# Patient Record
Sex: MALE | Race: WHITE
[De-identification: names, ages, dates, MRNs, and addresses within clinical notes are randomized per-mention and may not be internally consistent; named-entity substitution may affect disease eponyms.]

---

## 2023-11-22 ENCOUNTER — HOSPITAL ENCOUNTER (EMERGENCY)
Age: 68
Discharge: ANOTHER HOSPITAL | End: 2023-11-22
Payer: MEDICARE

## 2023-11-22 ENCOUNTER — HOSPITAL ENCOUNTER (INPATIENT)
Age: 68
LOS: 9 days | Discharge: HOME | DRG: 417 | End: 2023-12-01
Attending: INTERNAL MEDICINE | Admitting: STUDENT IN AN ORGANIZED HEALTH CARE EDUCATION/TRAINING PROGRAM
Payer: MEDICARE

## 2023-11-22 VITALS — SYSTOLIC BLOOD PRESSURE: 138 MMHG | DIASTOLIC BLOOD PRESSURE: 96 MMHG | OXYGEN SATURATION: 98 %

## 2023-11-22 VITALS — RESPIRATION RATE: 17 BRPM | DIASTOLIC BLOOD PRESSURE: 92 MMHG | HEART RATE: 58 BPM | SYSTOLIC BLOOD PRESSURE: 120 MMHG

## 2023-11-22 VITALS — RESPIRATION RATE: 17 BRPM | SYSTOLIC BLOOD PRESSURE: 127 MMHG | DIASTOLIC BLOOD PRESSURE: 90 MMHG | HEART RATE: 65 BPM

## 2023-11-22 VITALS — HEART RATE: 62 BPM | OXYGEN SATURATION: 96 % | RESPIRATION RATE: 17 BRPM

## 2023-11-22 VITALS
TEMPERATURE: 97.88 F | DIASTOLIC BLOOD PRESSURE: 96 MMHG | OXYGEN SATURATION: 98 % | RESPIRATION RATE: 16 BRPM | SYSTOLIC BLOOD PRESSURE: 138 MMHG | HEART RATE: 59 BPM

## 2023-11-22 VITALS
RESPIRATION RATE: 19 BRPM | HEART RATE: 60 BPM | DIASTOLIC BLOOD PRESSURE: 91 MMHG | OXYGEN SATURATION: 96 % | SYSTOLIC BLOOD PRESSURE: 124 MMHG

## 2023-11-22 VITALS — HEART RATE: 74 BPM | RESPIRATION RATE: 19 BRPM

## 2023-11-22 VITALS — DIASTOLIC BLOOD PRESSURE: 86 MMHG | SYSTOLIC BLOOD PRESSURE: 126 MMHG

## 2023-11-22 VITALS — OXYGEN SATURATION: 97 % | HEART RATE: 55 BPM | RESPIRATION RATE: 20 BRPM

## 2023-11-22 VITALS — HEART RATE: 63 BPM | OXYGEN SATURATION: 97 % | RESPIRATION RATE: 17 BRPM

## 2023-11-22 VITALS — RESPIRATION RATE: 16 BRPM | HEART RATE: 55 BPM

## 2023-11-22 VITALS — RESPIRATION RATE: 22 BRPM | HEART RATE: 65 BPM | OXYGEN SATURATION: 97 %

## 2023-11-22 VITALS — HEART RATE: 60 BPM | OXYGEN SATURATION: 96 % | RESPIRATION RATE: 16 BRPM

## 2023-11-22 VITALS — RESPIRATION RATE: 16 BRPM | HEART RATE: 57 BPM

## 2023-11-22 VITALS — RESPIRATION RATE: 5 BRPM | OXYGEN SATURATION: 99 % | HEART RATE: 53 BPM

## 2023-11-22 VITALS — OXYGEN SATURATION: 98 %

## 2023-11-22 VITALS — BODY MASS INDEX: 24.4 KG/M2

## 2023-11-22 VITALS — HEART RATE: 67 BPM

## 2023-11-22 DIAGNOSIS — Z79.899: ICD-10-CM

## 2023-11-22 DIAGNOSIS — F03.90: ICD-10-CM

## 2023-11-22 DIAGNOSIS — K80.01 CALCULUS OF GALLBLADDER WITH ACUTE CHOLECYSTITIS AND OBSTRUCTION: ICD-10-CM

## 2023-11-22 DIAGNOSIS — K83.1 OBSTRUCTIVE JAUNDICE: ICD-10-CM

## 2023-11-22 DIAGNOSIS — Z87.891: ICD-10-CM

## 2023-11-22 DIAGNOSIS — K83.09: Primary | ICD-10-CM

## 2023-11-22 DIAGNOSIS — R17: ICD-10-CM

## 2023-11-22 LAB
ADD MANUAL DIFF: NO
ALANINE AMINOTRANSFERASE: 174 U/L (ref 16–63)
ALBUMIN GLOBULIN RATIO: 0.8
ALBUMIN LEVEL: 3.1 G/DL (ref 3.4–5)
ALKALINE PHOSPHATASE: 472 U/L (ref 46–116)
AMYLASE: 46 U/L (ref 25–115)
ANION GAP: 14.7
ASPARTATE AMINO TRANSFERASE: 105 U/L (ref 15–37)
BLOOD UREA NITROGEN: 12 MG/DL (ref 7–18)
CALCIUM: 9.1 MG/DL (ref 8.5–10.1)
CARBON DIOXIDE: 23.6 MMOL/L (ref 21–32)
CAST SEEN?: (no result) #/LPF
CHLORIDE: 100 MMOL/L (ref 98–107)
CO2 BLD-SCNC: 23.6 MMOL/L (ref 21–32)
ESTIMATED GFR (AFRICAN AMERICA: >60 (ref 60–?)
ESTIMATED GFR (NON-AFRICAN AME: >60 (ref 60–?)
GLOBULIN: 4 G/DL
GLUCOSE BLD-MCNC: 93 MG/DL (ref 74–106)
GLUCOSE URINE UA: (no result) MG/DL
HCT VFR BLD CALC: 43.5 % (ref 42–54)
HEMATOCRIT: 43.5 % (ref 42–54)
HEMOGLOBIN: 15.1 G/DL (ref 14–18)
IMMATURE GRANULOCYTES ABS AUTO: 0.09 10^3/UL (ref 0–0.03)
IMMATURE GRANULOCYTES PCT AUTO: 1.3 % (ref 0–0.5)
LIPASE: 58 U/L (ref 16–77)
LYMPHOCYTES  ABSOLUTE AUTO: 1.3 10^3/UL (ref 1.2–3.8)
MCV RBC: 86.3 FL (ref 80–94)
MEAN CORPUSCULAR HEMOGLOBIN: 30 PG (ref 25.9–34)
MEAN CORPUSCULAR HGB CONC: 34.7 G/DL (ref 29.9–35.2)
MEAN CORPUSCULAR VOLUME: 86.3 FL (ref 80–94)
PLATELET # BLD: 251 10^3/UL (ref 150–450)
PLATELET COUNT: 251 10^3/UL (ref 150–450)
POTASSIUM SERPLBLD-SCNC: 3.3 MMOL/L (ref 3.5–5.1)
POTASSIUM: 3.3 MMOL/L (ref 3.5–5.1)
RED BLOOD COUNT: 5.04 10^6/UL (ref 4.7–6.1)
SODIUM BLD-SCNC: 135 MMOL/L (ref 136–145)
SODIUM: 135 MMOL/L (ref 136–145)
TOTAL PROTEIN: 7.1 G/DL (ref 6.4–8.2)
WBC # BLD: 7.2 10^3/UL (ref 4–11)
WHITE BLOOD COUNT: 7.2 10^3/UL (ref 4–11)

## 2023-11-22 PROCEDURE — 93005 ELECTROCARDIOGRAM TRACING: CPT

## 2023-11-22 PROCEDURE — 2060000000 HC ICU INTERMEDIATE R&B

## 2023-11-22 PROCEDURE — 36415 COLL VENOUS BLD VENIPUNCTURE: CPT

## 2023-11-22 PROCEDURE — 87186 SC STD MICRODIL/AGAR DIL: CPT

## 2023-11-22 PROCEDURE — 99285 EMERGENCY DEPT VISIT HI MDM: CPT

## 2023-11-22 PROCEDURE — 2580000003 HC RX 258: Performed by: NURSE PRACTITIONER

## 2023-11-22 PROCEDURE — 87150 DNA/RNA AMPLIFIED PROBE: CPT

## 2023-11-22 PROCEDURE — 96365 THER/PROPH/DIAG IV INF INIT: CPT

## 2023-11-22 PROCEDURE — 80048 BASIC METABOLIC PNL TOTAL CA: CPT

## 2023-11-22 PROCEDURE — 81001 URINALYSIS AUTO W/SCOPE: CPT

## 2023-11-22 PROCEDURE — 82150 ASSAY OF AMYLASE: CPT

## 2023-11-22 PROCEDURE — 83690 ASSAY OF LIPASE: CPT

## 2023-11-22 PROCEDURE — 80074 ACUTE HEPATITIS PANEL: CPT

## 2023-11-22 PROCEDURE — 74177 CT ABD & PELVIS W/CONTRAST: CPT

## 2023-11-22 PROCEDURE — 85025 COMPLETE CBC W/AUTO DIFF WBC: CPT

## 2023-11-22 PROCEDURE — 71045 X-RAY EXAM CHEST 1 VIEW: CPT

## 2023-11-22 PROCEDURE — 87522 HEPATITIS C REVRS TRNSCRPJ: CPT

## 2023-11-22 PROCEDURE — 87040 BLOOD CULTURE FOR BACTERIA: CPT

## 2023-11-22 PROCEDURE — 80076 HEPATIC FUNCTION PANEL: CPT

## 2023-11-22 RX ORDER — ONDANSETRON 4 MG/1
4 TABLET, ORALLY DISINTEGRATING ORAL EVERY 8 HOURS PRN
Status: DISCONTINUED | OUTPATIENT
Start: 2023-11-22 | End: 2023-12-01 | Stop reason: HOSPADM

## 2023-11-22 RX ORDER — SODIUM CHLORIDE 0.9 % (FLUSH) 0.9 %
5-40 SYRINGE (ML) INJECTION PRN
Status: DISCONTINUED | OUTPATIENT
Start: 2023-11-22 | End: 2023-12-01 | Stop reason: HOSPADM

## 2023-11-22 RX ORDER — SODIUM CHLORIDE 9 MG/ML
INJECTION, SOLUTION INTRAVENOUS CONTINUOUS
Status: DISCONTINUED | OUTPATIENT
Start: 2023-11-22 | End: 2023-11-23

## 2023-11-22 RX ORDER — ENOXAPARIN SODIUM 100 MG/ML
40 INJECTION SUBCUTANEOUS DAILY
Status: DISCONTINUED | OUTPATIENT
Start: 2023-11-23 | End: 2023-12-01 | Stop reason: HOSPADM

## 2023-11-22 RX ORDER — POTASSIUM CHLORIDE 7.45 MG/ML
10 INJECTION INTRAVENOUS PRN
Status: DISCONTINUED | OUTPATIENT
Start: 2023-11-22 | End: 2023-11-23

## 2023-11-22 RX ORDER — ONDANSETRON 2 MG/ML
4 INJECTION INTRAMUSCULAR; INTRAVENOUS EVERY 6 HOURS PRN
Status: DISCONTINUED | OUTPATIENT
Start: 2023-11-22 | End: 2023-12-01 | Stop reason: HOSPADM

## 2023-11-22 RX ORDER — SODIUM CHLORIDE 9 MG/ML
INJECTION, SOLUTION INTRAVENOUS PRN
Status: DISCONTINUED | OUTPATIENT
Start: 2023-11-22 | End: 2023-12-01 | Stop reason: HOSPADM

## 2023-11-22 RX ORDER — SODIUM CHLORIDE 0.9 % (FLUSH) 0.9 %
5-40 SYRINGE (ML) INJECTION EVERY 12 HOURS SCHEDULED
Status: DISCONTINUED | OUTPATIENT
Start: 2023-11-22 | End: 2023-12-01 | Stop reason: HOSPADM

## 2023-11-22 RX ORDER — MAGNESIUM SULFATE 1 G/100ML
1000 INJECTION INTRAVENOUS PRN
Status: DISCONTINUED | OUTPATIENT
Start: 2023-11-22 | End: 2023-12-01 | Stop reason: HOSPADM

## 2023-11-22 RX ADMIN — SODIUM CHLORIDE, PRESERVATIVE FREE 10 ML: 5 INJECTION INTRAVENOUS at 23:46

## 2023-11-22 RX ADMIN — SODIUM CHLORIDE: 9 INJECTION, SOLUTION INTRAVENOUS at 23:52

## 2023-11-22 NOTE — ECG_ITS
[facility] 
                                        
                                       Test Date:    2023 
Pat Name:     SANGITA BARRY          Department:    
Patient ID:   [id number]               Room:         - 
Gender:       Male                     Technician:    
:          1955               Requested By: 1030 
Order Number: [id number]              Reading MD:    
                                 Measurements 
Intervals                              Axis           
Rate:         57                       P:            35 
UT:           162                      QRS:          -36 
QRSD:         98                       T:            8 
QT:           398                                     
QTc:          392                                     
                           Interpretive Statements 
1100 Sinus rhythm 
4038 Nonspecific ST elevation 
4664 Twave abnormality, possible inferior ischemia 
7200 Abnormal left axis deviation 
8003 Consistent with pulmonary disease 
9150 **  abnormal ECG  ** 
No previous ECG available for comparison

## 2023-11-22 NOTE — XR_ITS
The [address] [address] 
     [phone] 
  
  
Patient Name: 
SANGITA BARRY 
  
MRN: [id number]    YOB: 1955    Sex: M 
Assigned Patient Location: ER 
Current Patient Location: ER 
Accession/Order Number: [id number] 
Exam Date: 11/22/2023  16:28    Report Date: 11/22/2023  17:14 
  
At the request of: 
MARZENA MOYA   
  
Procedure:  XR chest 1V 
  
EXAM: XR chest 1V  
  
HISTORY: weak, new jaundice 
  
COMPARISON: None.  
  
TECHNIQUE: Chest X-ray AP, 1 view 
  
FINDINGS:  
  
Support devices: Right-sided  shunt catheter is noted coursing through the  
right hemithorax. 
  
Lungs/pleura: No consolidation, effusion, or pneumothorax. 
  
Heart and mediastinum: Normal contours. 
  
Bones: No acute abnormality identified. 
  
ORDER #: [id number] XR/XR chest 1V  
Impression:  
No radiographic evidence of acute cardiopulmonary process.  
   
   
Electronically authenticated by: CRUZ SALGADO   Date: 11/22/2023  17:14
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
## 2023-11-22 NOTE — CT_ITS
The [address] [address] 
     [phone] 
  
  
Patient Name: 
SANGITA BARRY 
  
MRN: [id number]    YOB: 1955    Sex: M 
Assigned Patient Location: ED.MAIN 
Current Patient Location:  
Accession/Order Number: [id number] 
Exam Date: 11/22/2023  17:15    Report Date: 11/22/2023  18:15 
  
At the request of: 
MARZENA MOYA   
  
Procedure:  CT abdomen pelvis w con 
  
EXAM: CT abdomen pelvis w con  
  
HISTORY: new onset jaundice 
  
COMPARISON: None.  
  
TECHNIQUE: Axial CT imaging was performed through the abdomen and pelvis with  
intravenous contrast. Multiplanar reformats were performed. Dose reduction  
techniques were achieved by using automated exposure control and/or adjustment  
  
of mA and/or kV according to patient size and/or use of iterative  
reconstruction technique. 
  
FINDINGS: 
Lung bases: Lung bases are clear. No pleural effusion. 
  
GI upper: Unremarkable. 
  
Liver: Normal size and contour. 
Gallbladder: No significant abnormality. No cholelithiasis. 
Biliary system: There is dilatation of the intra and extrahepatic biliary  
ducts  
with beading appearance of the intrahepatic biliary ducts. The common bile  
duct  
tapers toward the ampulla without definite choledocholithiasis. Aforementioned  
  
findings consistent with acute sclerosing cholangitis. MRI of the abdomen with  
  
and without contrast and MRCP is recommended for better evaluation. 
Spleen: Normal size. 
Pancreas: Unremarkable. 
  
Adrenal glands: Normal adrenal glands. 
Kidneys/ureters: Normal contours. No hydronephrosis. No nephrolithiasis or  
ureterolithiasis. There is a 6.3 cm enhancing thin multiseptated right renal  
cyst (Bosniak type II F). Attention on abdominal MRI for better evaluation. 
  
Vessels: No aneurysm. 
Lymph Nodes: No lymphadenopathy. 
  
Small bowel: No wall thickening or dilatation. 
Colon: No wall thickening or dilatation. 
Appendix: No findings of appendicitis. 
Peritoneal cavity: No free fluid or pneumoperitoneum. Right  shunt is noted  
with distal tip within the mid upper abdomen. 
  
Lower : Prostatomegaly, measuring 5.4 cm with intravesical protrusion.  
Correlation with PSA is recommended. Trabeculation of the urinary bladder,  
likely due to chronic outlet obstruction. 
  
Bones: No acute bony abnormality.  
Soft tissues: No acute finding. 
  
Additional findings: None. 
  
ORDER #: [id number] CT/CT abdomen pelvis w con  
IMPRESSION:   
dilatation of the intra and extrahepatic biliary ducts with beading appearance 
  
   
of the intrahepatic biliary ducts. The common bile duct tapers toward the   
ampulla without definite choledocholithiasis. Aforementioned findings   
consistent with acute sclerosing cholangitis. MRI of the abdomen with and   
without contrast and MRCP is recommended for better evaluation and exclusion   
of   
neoplasm.  
   
6.3 cm enhancing thin multiseptated right renal cyst (Bosniak type II F).   
Attention on abdominal MRI for better evaluation and follow-up in 6 months is   
recommended.  
   
   
Electronically authenticated by: CRUZ SALGADO   Date: 11/22/2023  18:15

## 2023-11-23 PROBLEM — E83.51 HYPOCALCEMIA: Status: ACTIVE | Noted: 2023-11-23

## 2023-11-23 PROBLEM — K83.1 OBSTRUCTIVE JAUNDICE: Status: ACTIVE | Noted: 2023-11-23

## 2023-11-23 PROBLEM — R41.89 COGNITIVE IMPAIRMENT: Status: ACTIVE | Noted: 2023-11-23

## 2023-11-23 PROBLEM — E87.6 HYPOKALEMIA: Status: ACTIVE | Noted: 2023-11-23

## 2023-11-23 LAB
ALBUMIN SERPL-MCNC: 3 G/DL (ref 3.5–5.2)
ALBUMIN SERPL-MCNC: 3 G/DL (ref 3.5–5.2)
ALBUMIN/GLOB SERPL: 1 {RATIO} (ref 1–2.5)
ALBUMIN/GLOB SERPL: 1 {RATIO} (ref 1–2.5)
ALP SERPL-CCNC: 358 U/L (ref 40–129)
ALP SERPL-CCNC: 358 U/L (ref 40–129)
ALT SERPL-CCNC: 107 U/L (ref 5–41)
ALT SERPL-CCNC: 107 U/L (ref 5–41)
ANION GAP SERPL CALCULATED.3IONS-SCNC: 10 MMOL/L (ref 9–17)
ANION GAP SERPL CALCULATED.3IONS-SCNC: 10 MMOL/L (ref 9–17)
AST SERPL-CCNC: 76 U/L
AST SERPL-CCNC: 76 U/L
BILIRUB DIRECT SERPL-MCNC: 5.6 MG/DL
BILIRUB DIRECT SERPL-MCNC: 5.6 MG/DL
BILIRUB INDIRECT SERPL-MCNC: 2.9 MG/DL (ref 0–1)
BILIRUB INDIRECT SERPL-MCNC: 2.9 MG/DL (ref 0–1)
BILIRUB SERPL-MCNC: 8.5 MG/DL (ref 0.3–1.2)
BILIRUB SERPL-MCNC: 8.5 MG/DL (ref 0.3–1.2)
BUN SERPL-MCNC: 8 MG/DL (ref 8–23)
BUN SERPL-MCNC: 8 MG/DL (ref 8–23)
CA-I BLD-SCNC: 1.12 MMOL/L (ref 1.13–1.33)
CA-I BLD-SCNC: 1.12 MMOL/L (ref 1.13–1.33)
CALCIUM SERPL-MCNC: 8.4 MG/DL (ref 8.6–10.4)
CALCIUM SERPL-MCNC: 8.4 MG/DL (ref 8.6–10.4)
CHLORIDE SERPL-SCNC: 102 MMOL/L (ref 98–107)
CHLORIDE SERPL-SCNC: 102 MMOL/L (ref 98–107)
CO2 SERPL-SCNC: 22 MMOL/L (ref 20–31)
CO2 SERPL-SCNC: 22 MMOL/L (ref 20–31)
CREAT SERPL-MCNC: 0.6 MG/DL (ref 0.7–1.2)
CREAT SERPL-MCNC: 0.6 MG/DL (ref 0.7–1.2)
GFR SERPL CREATININE-BSD FRML MDRD: >60 ML/MIN/1.73M2
GFR SERPL CREATININE-BSD FRML MDRD: >60 ML/MIN/1.73M2
GLUCOSE SERPL-MCNC: 95 MG/DL (ref 70–99)
GLUCOSE SERPL-MCNC: 95 MG/DL (ref 70–99)
INR PPP: 0.9
INR PPP: 0.9
LIPASE SERPL-CCNC: 48 U/L (ref 13–60)
LIPASE SERPL-CCNC: 48 U/L (ref 13–60)
MAGNESIUM SERPL-MCNC: 2.3 MG/DL (ref 1.6–2.6)
MAGNESIUM SERPL-MCNC: 2.3 MG/DL (ref 1.6–2.6)
PHOSPHATE SERPL-MCNC: 2.6 MG/DL (ref 2.5–4.5)
PHOSPHATE SERPL-MCNC: 2.6 MG/DL (ref 2.5–4.5)
POTASSIUM SERPL-SCNC: 3.3 MMOL/L (ref 3.7–5.3)
POTASSIUM SERPL-SCNC: 3.3 MMOL/L (ref 3.7–5.3)
PROT SERPL-MCNC: 5.9 G/DL (ref 6.4–8.3)
PROT SERPL-MCNC: 5.9 G/DL (ref 6.4–8.3)
PROTHROMBIN TIME: 11.8 SEC (ref 11.7–14.9)
PROTHROMBIN TIME: 11.8 SEC (ref 11.7–14.9)
SODIUM SERPL-SCNC: 134 MMOL/L (ref 135–144)
SODIUM SERPL-SCNC: 134 MMOL/L (ref 135–144)
TRIGL SERPL-MCNC: 138 MG/DL
TRIGL SERPL-MCNC: 138 MG/DL

## 2023-11-23 PROCEDURE — 80053 COMPREHEN METABOLIC PANEL: CPT

## 2023-11-23 PROCEDURE — 84478 ASSAY OF TRIGLYCERIDES: CPT

## 2023-11-23 PROCEDURE — 94761 N-INVAS EAR/PLS OXIMETRY MLT: CPT

## 2023-11-23 PROCEDURE — 82330 ASSAY OF CALCIUM: CPT

## 2023-11-23 PROCEDURE — 2580000003 HC RX 258: Performed by: NURSE PRACTITIONER

## 2023-11-23 PROCEDURE — 6370000000 HC RX 637 (ALT 250 FOR IP): Performed by: INTERNAL MEDICINE

## 2023-11-23 PROCEDURE — 83690 ASSAY OF LIPASE: CPT

## 2023-11-23 PROCEDURE — 6360000002 HC RX W HCPCS: Performed by: NURSE PRACTITIONER

## 2023-11-23 PROCEDURE — 83516 IMMUNOASSAY NONANTIBODY: CPT

## 2023-11-23 PROCEDURE — 82248 BILIRUBIN DIRECT: CPT

## 2023-11-23 PROCEDURE — 85610 PROTHROMBIN TIME: CPT

## 2023-11-23 PROCEDURE — 83735 ASSAY OF MAGNESIUM: CPT

## 2023-11-23 PROCEDURE — 84100 ASSAY OF PHOSPHORUS: CPT

## 2023-11-23 PROCEDURE — 36415 COLL VENOUS BLD VENIPUNCTURE: CPT

## 2023-11-23 PROCEDURE — 2060000000 HC ICU INTERMEDIATE R&B

## 2023-11-23 PROCEDURE — 93005 ELECTROCARDIOGRAM TRACING: CPT | Performed by: NURSE PRACTITIONER

## 2023-11-23 PROCEDURE — 99222 1ST HOSP IP/OBS MODERATE 55: CPT | Performed by: INTERNAL MEDICINE

## 2023-11-23 RX ORDER — POTASSIUM CHLORIDE 7.45 MG/ML
10 INJECTION INTRAVENOUS PRN
Status: DISCONTINUED | OUTPATIENT
Start: 2023-11-23 | End: 2023-12-01 | Stop reason: HOSPADM

## 2023-11-23 RX ORDER — POTASSIUM CHLORIDE 20 MEQ/1
40 TABLET, EXTENDED RELEASE ORAL PRN
Status: DISCONTINUED | OUTPATIENT
Start: 2023-11-23 | End: 2023-12-01 | Stop reason: HOSPADM

## 2023-11-23 RX ORDER — CALCIUM CARBONATE 500 MG/1
1000 TABLET, CHEWABLE ORAL ONCE
Status: COMPLETED | OUTPATIENT
Start: 2023-11-23 | End: 2023-11-23

## 2023-11-23 RX ADMIN — POTASSIUM BICARBONATE 40 MEQ: 782 TABLET, EFFERVESCENT ORAL at 16:43

## 2023-11-23 RX ADMIN — ANTACID TABLETS 1000 MG: 500 TABLET, CHEWABLE ORAL at 12:31

## 2023-11-23 RX ADMIN — SODIUM CHLORIDE, PRESERVATIVE FREE 10 ML: 5 INJECTION INTRAVENOUS at 07:34

## 2023-11-23 RX ADMIN — ENOXAPARIN SODIUM 40 MG: 100 INJECTION SUBCUTANEOUS at 07:34

## 2023-11-23 ASSESSMENT — ENCOUNTER SYMPTOMS
NAUSEA: 0
BLOOD IN STOOL: 0
SHORTNESS OF BREATH: 0
COUGH: 0
COLOR CHANGE: 1
PHOTOPHOBIA: 0
WHEEZING: 0
VOMITING: 0
ABDOMINAL DISTENTION: 0

## 2023-11-23 NOTE — PROGRESS NOTES
Patient admitted from [address] [place] [id number], hooked up to monitor, vitals taken and charted, assessment charted. Patient A/O to self only. Unable to answer any admission questions. Per report from RN at [address] [place] [id number], patients wife will be here tomorrow 11/23. Writer notified primary on call NP of admission     [phone]: patient HR ranging from 49-55 while laying in bed resting. Notified on call NP. See new orders    [phone]: notified on call NP of EKG. Also patient now pulled out 2 PIV and constantly ripping off telemetry. See new orders.  Nursing supervisor as well as emergency contact notified of new orders

## 2023-11-23 NOTE — CONSULTS
[facility] CONSULT    Patient:   Winston Mendez   :    1955   Facility:   [address]   Date:    2023  Admission Dx:  Sclerosing cholangitis [K83.09]  Requesting physician: Alondra Rob DO  Reason for consult:  Primary sclerosing cholangitis   CC : Jaundice    SUBJECTIVE     HISTORY OF PRESENT ILLNESS  This is a 74 y.o. with Past medial history significant of Head injury and Dementia. History was limited due to the patient being poor historian(dementia) and family/caretaker not present. Most of the history was taken from the paper charts from [facility].    There was sudden acute onset of jaundice this week and patient was brought to [place] by his caretaker. Labs revealed Total bilirubin 16.6 with Direct Bilirubin being 12.9 and , and . CT abdomen and Pelvis revealed intrahepatic and extrahepatic strictures(Possible PSC). He was transferred to [address] [place] [address] for further workup. On my evaluation,patient was afebrile and hemodynamically stable. Patient was confused likely secondary to dementia. He denied fever,chills,abdominal pain ,nausea, vomiting, diarrhea, constipation, pruritis. He said he was \" not a big drinker\". On evaluation,patient was icteric with no abdominal distension or tenderness noted. Patient had dry skin but no excoriations were noted. OBJECTIVE:     PAST MEDICAL/SURGICAL HISTORY  History reviewed. No pertinent past medical history. History reviewed. No pertinent surgical history.     ALLERGIES:  No Known Allergies    HOME MEDICATIONS:  Prior to Admission medications    Not on File       CURRENT MEDICATIONS:  Scheduled Meds:   sodium chloride flush  5-40 mL IntraVENous 2 times per day    enoxaparin  40 mg SubCUTAneous Daily     Continuous Infusions:   sodium chloride 150 mL/hr at 23 2352    sodium chloride       PRN Meds:sodium chloride flush, sodium

## 2023-11-23 NOTE — PLAN OF CARE
Problem: Discharge Planning  Goal: Discharge to home or other facility with appropriate resources  11/23/2023 0909 by Sam Ramirez RN  Outcome: Progressing     Problem: Safety - Adult  Goal: Free from fall injury  11/23/2023 0909 by Sam Ramirez RN  Outcome: Progressing
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
## 2023-11-23 NOTE — PLAN OF CARE
Problem: Safety - Medical Restraint  Goal: Remains free of injury from restraints (Restraint for Interference with Medical Device)  Description: INTERVENTIONS:  1. Determine that other, less restrictive measures have been tried or would not be effective before applying the restraint  2. Evaluate the patient's condition at the time of restraint application  3. Inform patient/family regarding the reason for restraint  4.  Q2H: Monitor safety, psychosocial status, comfort, nutrition and hydration  11/23/2023 0639 by La Ott RN  Outcome: Completed  11/23/2023 0303 by La Ott RN  Outcome: Progressing

## 2023-11-23 NOTE — PLAN OF CARE
Problem: Discharge Planning  Goal: Discharge to home or other facility with appropriate resources  Outcome: Progressing     Problem: Safety - Adult  Goal: Free from fall injury  Outcome: Progressing     Problem: ABCDS Injury Assessment  Goal: Absence of physical injury  Outcome: Progressing     Problem: Safety - Medical Restraint  Goal: Remains free of injury from restraints (Restraint for Interference with Medical Device)  Description: INTERVENTIONS:  1. Determine that other, less restrictive measures have been tried or would not be effective before applying the restraint  2. Evaluate the patient's condition at the time of restraint application  3. Inform patient/family regarding the reason for restraint  4.  Q2H: Monitor safety, psychosocial status, comfort, nutrition and hydration  Outcome: Progressing

## 2023-11-23 NOTE — PROGRESS NOTES
Writer messaged Lanie Ellis to inform her that pt has a shunt & per pt significant other pt has a mind of a 4 yr old & is unable to answer questions appropriately;per Rogers MRI does not have to be STAT;writer also inquired if it is ok for pt to eat;per Dani See it is ok for him to resume a soft diet

## 2023-11-23 NOTE — PROGRESS NOTES
Pt removed PIV;Dr. Kailey Gómez at bedside;per Dr. Kailey Gómez ok for pt not to have PIV at this time

## 2023-11-23 NOTE — H&P
[facility]  Office: [phone]  Dianne Crowley DO, Wilma Singh DO, Shari Kelly, DO, Whitney Seaman MD, Moose El MD, Ronald Lopez MD, Alen Bergeron MD,  Guillermina Marte MD, Gudelia Pappas MD, Kaz Begum MD,  Marilu Avitia MD, Niles Upton MD, Gail Heart DO, Willow Snellen, MD,  Awilda Juarez DO, Nenita Munoz MD, Enrico Raymond MD, Shahla Rodrigez MD, Tash Cross MD,  Mary Saenz MD, Roosevelt Alvarado MD, Rosi Carlton MD, Fermín Bey MD, Anup Reynolds MD, Tani Craig DO, Kiki Dewitt DO, Connie Leblanc MD,  Alcira Palencia MD, Anaid Johnson, CNP,  Mich Espana, CNP, Daljit Wilkes, CNP,  Elan Taveras, [facility], Parker Reyes, CNP, Юлия Martell, CNP, Irene Alba, CNP, Doristine Olszewski, CNP, Teresa Chavarria, CNP, Lorelei Robertson, [address] , Barbara Swann, CNP, Be Jordan, CNS, Ike Chan, CNP, Kylie Milian, CNP         IN-PATIENT SERVICE  History and Physical  [facility] [facility] [address]          Name: Nicky Obando  MRN: [id number]     Acct: [de-identified]  Room: [address] [place] [address]    Admit Date: 11/22/2023  PCP: No primary care provider on file. Chief Complaint:  No chief complaint on file.        History of Present Illness:  Nicky Obando is a 74 y.o.  male who presents with jaundice    The patient was transferred from [facility] for evaluation and treatment of jaundice  Patient has underlying cognitive impairment and is unable to provide any reliable historical data  At this time patient denies any abdominal pain  He has not observed any dark urine or light-colored stools  Pruritus is denied    Initial database has included:  Bilirubin 16.6  Direct bilirubin 12.9        Alk phos 472    Amylase 46  Lipase 58    CT scanning revealed dilation of the intra and extrahepatic biliary ducts with up beading appearance of the intrahepatic ducts  A 6.3 cm multiseptated right renal cyst was

## 2023-11-23 NOTE — CARE COORDINATION
Transitional planning    Writer to room to conduct intake assessment, unable to get any info from patient alert to self, no listed contacts.

## 2023-11-24 LAB
A. CALCOACETICUS-BAUMANNII CPX: NOT DETECTED
ALBUMIN SERPL-MCNC: 3.3 G/DL (ref 3.5–5.2)
ALBUMIN SERPL-MCNC: 3.3 G/DL (ref 3.5–5.2)
ALBUMIN/GLOB SERPL: 1.2 {RATIO} (ref 1–2.5)
ALBUMIN/GLOB SERPL: 1.2 {RATIO} (ref 1–2.5)
ALP SERPL-CCNC: 363 U/L (ref 40–129)
ALP SERPL-CCNC: 363 U/L (ref 40–129)
ALT SERPL-CCNC: 100 U/L (ref 5–41)
ALT SERPL-CCNC: 100 U/L (ref 5–41)
AMMONIA PLAS-SCNC: 27 UMOL/L (ref 16–60)
AMMONIA PLAS-SCNC: 27 UMOL/L (ref 16–60)
ANION GAP SERPL CALCULATED.3IONS-SCNC: 10 MMOL/L (ref 9–17)
ANION GAP SERPL CALCULATED.3IONS-SCNC: 10 MMOL/L (ref 9–17)
AST SERPL-CCNC: 70 U/L
AST SERPL-CCNC: 70 U/L
BACTEROIDES FRAGILIS: NOT DETECTED
BASOPHILS # BLD: 0.06 K/UL (ref 0–0.2)
BASOPHILS # BLD: 0.06 K/UL (ref 0–0.2)
BASOPHILS NFR BLD: 1 % (ref 0–2)
BASOPHILS NFR BLD: 1 % (ref 0–2)
BILIRUB DIRECT SERPL-MCNC: 4.9 MG/DL
BILIRUB DIRECT SERPL-MCNC: 4.9 MG/DL
BILIRUB INDIRECT SERPL-MCNC: 2.6 MG/DL (ref 0–1)
BILIRUB INDIRECT SERPL-MCNC: 2.6 MG/DL (ref 0–1)
BILIRUB SERPL-MCNC: 7.5 MG/DL (ref 0.3–1.2)
BILIRUB SERPL-MCNC: 7.5 MG/DL (ref 0.3–1.2)
BUN SERPL-MCNC: 9 MG/DL (ref 8–23)
BUN SERPL-MCNC: 9 MG/DL (ref 8–23)
CA-I BLD-SCNC: 1.18 MMOL/L (ref 1.13–1.33)
CA-I BLD-SCNC: 1.18 MMOL/L (ref 1.13–1.33)
CALCIUM SERPL-MCNC: 8.7 MG/DL (ref 8.6–10.4)
CALCIUM SERPL-MCNC: 8.7 MG/DL (ref 8.6–10.4)
CANCER AG19-9 SERPL IA-ACNC: 650 U/ML (ref 0–35)
CANCER AG19-9 SERPL IA-ACNC: 650 U/ML (ref 0–35)
CANDIDA AURIS: NOT DETECTED
CANDIDA GLABRATA: NOT DETECTED
CEA SERPL-MCNC: 1.6 NG/ML
CEA SERPL-MCNC: 1.6 NG/ML
CHLORIDE SERPL-SCNC: 102 MMOL/L (ref 98–107)
CHLORIDE SERPL-SCNC: 102 MMOL/L (ref 98–107)
CO2 SERPL-SCNC: 24 MMOL/L (ref 20–31)
CO2 SERPL-SCNC: 24 MMOL/L (ref 20–31)
CREAT SERPL-MCNC: 0.6 MG/DL (ref 0.7–1.2)
CREAT SERPL-MCNC: 0.6 MG/DL (ref 0.7–1.2)
CTX-M: (no result)
EKG ATRIAL RATE: 50 BPM
EKG ATRIAL RATE: 50 BPM
EKG P AXIS: 31 DEGREES
EKG P AXIS: 31 DEGREES
EKG P-R INTERVAL: 158 MS
EKG P-R INTERVAL: 158 MS
EKG Q-T INTERVAL: 454 MS
EKG Q-T INTERVAL: 454 MS
EKG QRS DURATION: 90 MS
EKG QRS DURATION: 90 MS
EKG QTC CALCULATION (BAZETT): 413 MS
EKG QTC CALCULATION (BAZETT): 413 MS
EKG R AXIS: -27 DEGREES
EKG R AXIS: -27 DEGREES
EKG T AXIS: 2 DEGREES
EKG T AXIS: 2 DEGREES
EKG VENTRICULAR RATE: 50 BPM
EKG VENTRICULAR RATE: 50 BPM
ENTEROBACTERALES: NOT DETECTED
ENTEROCOCCUS FAECALIS: NOT DETECTED
ENTEROCOCCUS FAECIUM: NOT DETECTED
EOSINOPHIL # BLD: 0.21 K/UL (ref 0–0.44)
EOSINOPHIL # BLD: 0.21 K/UL (ref 0–0.44)
EOSINOPHILS RELATIVE PERCENT: 3 % (ref 1–4)
EOSINOPHILS RELATIVE PERCENT: 3 % (ref 1–4)
ERYTHROCYTE [DISTWIDTH] IN BLOOD BY AUTOMATED COUNT: 17.1 % (ref 11.8–14.4)
ERYTHROCYTE [DISTWIDTH] IN BLOOD BY AUTOMATED COUNT: 17.1 % (ref 11.8–14.4)
GFR SERPL CREATININE-BSD FRML MDRD: >60 ML/MIN/1.73M2
GFR SERPL CREATININE-BSD FRML MDRD: >60 ML/MIN/1.73M2
GLUCOSE SERPL-MCNC: 82 MG/DL (ref 70–99)
GLUCOSE SERPL-MCNC: 82 MG/DL (ref 70–99)
HCT VFR BLD AUTO: 42.7 % (ref 40.7–50.3)
HCT VFR BLD AUTO: 42.7 % (ref 40.7–50.3)
HGB BLD-MCNC: 14.2 G/DL (ref 13–17)
HGB BLD-MCNC: 14.2 G/DL (ref 13–17)
IGM SERPL-MCNC: 82 MG/DL (ref 40–230)
IGM SERPL-MCNC: 82 MG/DL (ref 40–230)
IMM GRANULOCYTES # BLD AUTO: 0.07 K/UL (ref 0–0.3)
IMM GRANULOCYTES # BLD AUTO: 0.07 K/UL (ref 0–0.3)
IMM GRANULOCYTES NFR BLD: 1 %
IMM GRANULOCYTES NFR BLD: 1 %
IMP: (no result)
KLEBSIELLA AEROGENES: NOT DETECTED
KLEBSIELLA PNEUMONIAE GROUP: NOT DETECTED
KPC: (no result)
LYMPHOCYTES NFR BLD: 1.58 K/UL (ref 1.1–3.7)
LYMPHOCYTES NFR BLD: 1.58 K/UL (ref 1.1–3.7)
LYMPHOCYTES RELATIVE PERCENT: 25 % (ref 24–43)
LYMPHOCYTES RELATIVE PERCENT: 25 % (ref 24–43)
MCH RBC QN AUTO: 30 PG (ref 25.2–33.5)
MCH RBC QN AUTO: 30 PG (ref 25.2–33.5)
MCHC RBC AUTO-ENTMCNC: 33.3 G/DL (ref 28.4–34.8)
MCHC RBC AUTO-ENTMCNC: 33.3 G/DL (ref 28.4–34.8)
MCR-1: (no result)
MCV RBC AUTO: 90.1 FL (ref 82.6–102.9)
MCV RBC AUTO: 90.1 FL (ref 82.6–102.9)
MECA/C AND MREJ (MRSA): (no result)
MECA/C: (no result)
MONOCYTES NFR BLD: 0.54 K/UL (ref 0.1–1.2)
MONOCYTES NFR BLD: 0.54 K/UL (ref 0.1–1.2)
MONOCYTES NFR BLD: 9 % (ref 3–12)
MONOCYTES NFR BLD: 9 % (ref 3–12)
NDM: (no result)
NEUTROPHILS NFR BLD: 61 % (ref 36–65)
NEUTROPHILS NFR BLD: 61 % (ref 36–65)
NEUTS SEG NFR BLD: 3.78 K/UL (ref 1.5–8.1)
NEUTS SEG NFR BLD: 3.78 K/UL (ref 1.5–8.1)
NRBC BLD-RTO: 0 PER 100 WBC
NRBC BLD-RTO: 0 PER 100 WBC
OXA-48-LIKE: (no result)
PLATELET # BLD AUTO: 254 K/UL (ref 138–453)
PLATELET # BLD AUTO: 254 K/UL (ref 138–453)
PMV BLD AUTO: 11.5 FL (ref 8.1–13.5)
PMV BLD AUTO: 11.5 FL (ref 8.1–13.5)
POTASSIUM SERPL-SCNC: 3.7 MMOL/L (ref 3.7–5.3)
POTASSIUM SERPL-SCNC: 3.7 MMOL/L (ref 3.7–5.3)
PROT SERPL-MCNC: 6 G/DL (ref 6.4–8.3)
PROT SERPL-MCNC: 6 G/DL (ref 6.4–8.3)
PROTEUS SPP.: NOT DETECTED
RBC # BLD AUTO: 4.74 M/UL (ref 4.21–5.77)
RBC # BLD AUTO: 4.74 M/UL (ref 4.21–5.77)
RBC # BLD: ABNORMAL 10*6/UL
RBC # BLD: ABNORMAL 10*6/UL
RHEUMATOID FACT SER NEPH-ACNC: <10 IU/ML
RHEUMATOID FACT SER NEPH-ACNC: <10 IU/ML
SALMONELLA SPP.: NOT DETECTED
SERRATIA MARCESCENS: NOT DETECTED
SODIUM SERPL-SCNC: 136 MMOL/L (ref 135–144)
SODIUM SERPL-SCNC: 136 MMOL/L (ref 135–144)
STAPHYLOCOCCUS EPIDERMIDIS: NOT DETECTED
STAPHYLOCOCCUS LUGDUNENSIS: NOT DETECTED
STAPHYLOCOCCUS SPP.: DETECTED
STENOTROPHOMONAS MALTOPHILIA: NOT DETECTED
STREPTOCOCCUS PYOGENES: NOT DETECTED
STREPTOCOCCUS SPP.: NOT DETECTED
VANA/B: (no result)
VIM: (no result)
WBC OTHER # BLD: 6.2 K/UL (ref 3.5–11.3)
WBC OTHER # BLD: 6.2 K/UL (ref 3.5–11.3)

## 2023-11-24 PROCEDURE — 86301 IMMUNOASSAY TUMOR CA 19-9: CPT

## 2023-11-24 PROCEDURE — 97530 THERAPEUTIC ACTIVITIES: CPT

## 2023-11-24 PROCEDURE — 6370000000 HC RX 637 (ALT 250 FOR IP): Performed by: INTERNAL MEDICINE

## 2023-11-24 PROCEDURE — 86431 RHEUMATOID FACTOR QUANT: CPT

## 2023-11-24 PROCEDURE — 83516 IMMUNOASSAY NONANTIBODY: CPT

## 2023-11-24 PROCEDURE — 82784 ASSAY IGA/IGD/IGG/IGM EACH: CPT

## 2023-11-24 PROCEDURE — 85025 COMPLETE CBC W/AUTO DIFF WBC: CPT

## 2023-11-24 PROCEDURE — 2060000000 HC ICU INTERMEDIATE R&B

## 2023-11-24 PROCEDURE — 97161 PT EVAL LOW COMPLEX 20 MIN: CPT

## 2023-11-24 PROCEDURE — 86038 ANTINUCLEAR ANTIBODIES: CPT

## 2023-11-24 PROCEDURE — 99233 SBSQ HOSP IP/OBS HIGH 50: CPT | Performed by: INTERNAL MEDICINE

## 2023-11-24 PROCEDURE — 86225 DNA ANTIBODY NATIVE: CPT

## 2023-11-24 PROCEDURE — 80076 HEPATIC FUNCTION PANEL: CPT

## 2023-11-24 PROCEDURE — 97166 OT EVAL MOD COMPLEX 45 MIN: CPT

## 2023-11-24 PROCEDURE — 80048 BASIC METABOLIC PNL TOTAL CA: CPT

## 2023-11-24 PROCEDURE — 82330 ASSAY OF CALCIUM: CPT

## 2023-11-24 PROCEDURE — 36415 COLL VENOUS BLD VENIPUNCTURE: CPT

## 2023-11-24 PROCEDURE — 93010 ELECTROCARDIOGRAM REPORT: CPT | Performed by: INTERNAL MEDICINE

## 2023-11-24 PROCEDURE — 82787 IGG 1 2 3 OR 4 EACH: CPT

## 2023-11-24 PROCEDURE — 99232 SBSQ HOSP IP/OBS MODERATE 35: CPT | Performed by: HOSPITALIST

## 2023-11-24 PROCEDURE — 82140 ASSAY OF AMMONIA: CPT

## 2023-11-24 PROCEDURE — 97535 SELF CARE MNGMENT TRAINING: CPT

## 2023-11-24 PROCEDURE — 82378 CARCINOEMBRYONIC ANTIGEN: CPT

## 2023-11-24 RX ORDER — URSODIOL 250 MG/1
500 TABLET, FILM COATED ORAL 2 TIMES DAILY
Status: DISCONTINUED | OUTPATIENT
Start: 2023-11-24 | End: 2023-11-30

## 2023-11-24 RX ADMIN — URSODIOL 500 MG: 250 TABLET, FILM COATED ORAL at 17:39

## 2023-11-24 NOTE — PLAN OF CARE
Problem: Discharge Planning  Goal: Discharge to home or other facility with appropriate resources  11/24/2023 0832 by Bibi Loaiza RN  Outcome: Progressing  11/23/2023 1944 by Cristian Gómez RN  Outcome: Progressing     Problem: Safety - Adult  Goal: Free from fall injury  11/24/2023 0832 by Bibi Loaiza RN  Outcome: Progressing  11/23/2023 1944 by Cristian Gómez RN  Outcome: Progressing     Problem: ABCDS Injury Assessment  Goal: Absence of physical injury  11/24/2023 0832 by Bibi Loaiza RN  Outcome: Progressing  11/23/2023 1944 by Cristian Gómez RN  Outcome: Progressing

## 2023-11-24 NOTE — PROGRESS NOTES
Occupational Therapy  Facility/Department: [facility]  Occupational Therapy Initial Assessment      Name: Shan Fields  : 1955  MRN: [id number]  Date of Service: 2023    Discharge Recommendations:  Patient would benefit from continued therapy after discharge    OT Equipment Recommendations  Equipment Needed: Yes  Other: If Pt D/C's to home:  Tub Bench + Grab bar tub  //  Grab bar toilet  //  *Will continue to make DME recommendations for DC*     Patient Diagnosis(es): There were no encounter diagnoses. Past Medical History:  has a past medical history of Dementia ([address]). Past Surgical History:  has no past surgical history on file. Assessment   Performance deficits / Impairments: Decreased functional mobility ; Decreased endurance;Decreased coordination;Decreased ADL status; Decreased cognition;Decreased safe awareness;Decreased fine motor control;Decreased strength;Decreased balance;Decreased posture  Assessment: Pt currently requires increased assistance for all ADLs and Functional // ADL Transfers. At this time, the Pt is demonsrating decreased overall activity tolerance, generalized weakness, impaired balance, and decreased participation in functional tasks. He is participating in Sit<>Stand, Standing Balance, Functional // ADL Transfers, and Functional Mobility with CGA (without DME), but requires frequent rest breaks d/t decreased endurance. Due to decreased safety awareness and insight to defictis, Pt requires constant Mod Cues for task initiation & sequencing // overall safety & accuracy with each task // integration of activity pacing (dt poor activity tolerance). Secondary to these functional deficits, Pt will benefit from ongoing (Acute and Post-Acute OT). Based on Pt's current status, it would be unsafe for Pt to return home to prior (independent) living setting; it is highly recommended that she have 24 Hour Supervision and Assist at D/C.   Prognosis: Good;Fair  Decision Making: Medium Complexity  REQUIRES OT FOLLOW-UP: Yes  Activity Tolerance  Activity Tolerance: Patient limited by fatigue;Treatment limited secondary to decreased cognition;Patient Tolerated treatment well          Plan   Occupational Therapy Plan  Times Per Week: 4-5x/week  Current Treatment Recommendations: Balance training, Functional mobility training, Endurance training, Neuromuscular re-education, Equipment evaluation, education, & procurement, Patient/Caregiver education & training, Safety education & training, Self-Care / ADL, Cognitive/Perceptual training, Coordination training, Pain management       Restrictions  Restrictions/Precautions  Restrictions/Precautions: Up as Tolerated, Fall Risk, NPO  Position Activity Restriction  Other position/activity restrictions: Currently NPO - OK for ice chips only  //  PMHx Head Injury & Dementia      Subjective   General  Patient assessed for rehabilitation services?: Yes  Diagnosis: Per EMR:  Patient is a 74 y.o. Male who presented from [facility] (no caregiver present, information taken primarily from [address], [address] [facility] with PMHx Head Injury & Dementia). There was sudden acute onset of jaundice this week and patient was brought to [place] by his caretaker. CT abdomen and Pelvis revealed intrahepatic and extrahepatic strictures (possible PSC); Patient was transferred to [address] [place] [address] for further workup.        Social/Functional History  Social/Functional History  Lives With: Significant other  Type of Home: House  Home Layout: One level, Work area in basement (+ Basement)  Home Access: Stairs to enter without rails  Entrance Stairs - Number of Steps: 4  Bathroom Shower/Tub: Tub/Shower unit  Bathroom Toilet: Standard  ADL Assistance: Independent  Homemaking Assistance: Needs assistance (Pt reports IADLs are performed by Family)  Ambulation Assistance: Independent (No AD // DME mobility)  Transfer Assistance: Independent  Active : Yes  Mode of Transportation:

## 2023-11-24 NOTE — CARE COORDINATION
Case Management Assessment  Initial Evaluation    Date/Time of Evaluation: 11/24/2023 3:42 PM  Assessment Completed by: Bridgette Levy RN    If patient is discharged prior to next notation, then this note serves as note for discharge by case management. Patient Name: Cas Gaytan                   YOB: 1955  Diagnosis: Sclerosing cholangitis [K83.09]                   Date / Time: 11/22/2023 10:52 PM    Patient Admission Status: Inpatient   Readmission Risk (Low < 19, Mod (19-27), High > 27): Readmission Risk Score: 9.4    Current PCP: No primary care provider on file. PCP verified by CM? Yes (Brittney Rivas CNP [place])    Chart Reviewed: Yes      History Provided by: Significant Other  Patient Orientation: Unable to Assess    Patient Cognition: Severely Impaired    Hospitalization in the last 30 days (Readmission):  No    If yes, Readmission Assessment in CM Navigator will be completed. Advance Directives:      Code Status: Full Code   Patient's Primary Decision Maker is:  (paperwork in soft chart. Guardian is Antionette Lemus)      Discharge Planning:    Patient lives with: Spouse/Significant Other Type of Home: House  Primary Care Giver: Other (Comment) (girlfriend)  Patient Support Systems include: Spouse/Significant Other   Current Financial resources: Medicare  Current community resources:    Current services prior to admission:              Current DME:              Type of Home Care services:       ADLS  Prior functional level: Assistance with the following:, Bathing, Cooking, Housework, Shopping, Feeding  Current functional level: Assistance with the following:, Cooking, Housework, Feeding, Shopping    PT AM-PAC: 24 /24  OT AM-PAC:   /24    Family can provide assistance at DC: Yes  Would you like Case Management to discuss the discharge plan with any other family members/significant others, and if so, who?     Plans to Return to Present Housing: Unknown at present  Other Identified

## 2023-11-24 NOTE — PROGRESS NOTES
Physical Therapy  Facility/Department: [facility]  Physical Therapy Initial Assessment    Name: Maximilian Aquino  : 1955  MRN: [id number]  Date of Service: 2023    Discharge Recommendations:  No therapy recommended at discharge   PT Equipment Recommendations  Equipment Needed: No      Patient Diagnosis(es): There were no encounter diagnoses. Past Medical History:  has a past medical history of Dementia ([address]). Past Surgical History:  has no past surgical history on file. Assessment   Assessment: The pt ambulated 250 ft without a device x SBA. He ambulated safely with no c/o pain, SOB, or fatigue. No further PT intervention is needed at this time  Therapy Prognosis: Good  Decision Making: Medium Complexity  Requires PT Follow-Up: No  Activity Tolerance  Activity Tolerance: Patient tolerated treatment well     Plan   Physical Therapy Plan  General Plan: Discharge with evaluation only  Safety Devices  Type of Devices: Nurse notified, Left in chair, Gait belt, Call light within reach, Bed alarm in place  Restraints  Restraints Initially in Place: No     Restrictions  Restrictions/Precautions  Restrictions/Precautions: Up as Tolerated, Fall Risk, NPO  Position Activity Restriction  Other position/activity restrictions: Currently NPO - OK for ice chips only  //  PMHx Head Injury & Dementia  // Oriented x1 (person)    Subjective   General  Patient assessed for rehabilitation services?: Yes  Response To Previous Treatment: Not applicable  Family / Caregiver Present: No  Follows Commands: Within Functional Limits  Subjective  Subjective:  The pt denies pain         Social/Functional History  Social/Functional History  Lives With: Family  Type of Home: House  Home Layout: One level, Work area in basement (+ Basement)  Home Access: Stairs to enter without rails  Entrance Stairs - Number of Steps: 4  Bathroom Shower/Tub: Tub/Shower unit  Bathroom Toilet: Standard  ADL Assistance: Independent  Homemaking

## 2023-11-24 NOTE — PLAN OF CARE
Problem: Discharge Planning  Goal: Discharge to home or other facility with appropriate resources  11/23/2023 1944 by Gary Candelaria RN  Outcome: Progressing  11/23/2023 0909 by Sanford Spurling, RN  Outcome: Progressing     Problem: Safety - Adult  Goal: Free from fall injury  11/23/2023 1944 by Gary Candelaria RN  Outcome: Progressing  11/23/2023 0909 by Sanford Spurling, RN  Outcome: Progressing     Problem: ABCDS Injury Assessment  Goal: Absence of physical injury  Outcome: Progressing     Problem: Safety - Medical Restraint  Goal: Remains free of injury from restraints (Restraint for Interference with Medical Device)  Description: INTERVENTIONS:  1. Determine that other, less restrictive measures have been tried or would not be effective before applying the restraint  2. Evaluate the patient's condition at the time of restraint application  3. Inform patient/family regarding the reason for restraint  4.  Q2H: Monitor safety, psychosocial status, comfort, nutrition and hydration  11/23/2023 0639 by Mark Villalobos RN  Outcome: Completed

## 2023-11-24 NOTE — CARE COORDINATION
Transitional Planning:  Attempted to complete initial CM assessment. Patient does not know where he lives. He is unable to answer questions due to lack of memory.

## 2023-11-25 LAB
ALBUMIN SERPL-MCNC: 3.1 G/DL (ref 3.5–5.2)
ALBUMIN SERPL-MCNC: 3.1 G/DL (ref 3.5–5.2)
ALBUMIN/GLOB SERPL: 1.1 {RATIO} (ref 1–2.5)
ALBUMIN/GLOB SERPL: 1.1 {RATIO} (ref 1–2.5)
ALP SERPL-CCNC: 362 U/L (ref 40–129)
ALP SERPL-CCNC: 362 U/L (ref 40–129)
ALT SERPL-CCNC: 98 U/L (ref 5–41)
ALT SERPL-CCNC: 98 U/L (ref 5–41)
ANION GAP SERPL CALCULATED.3IONS-SCNC: 12 MMOL/L (ref 9–17)
ANION GAP SERPL CALCULATED.3IONS-SCNC: 12 MMOL/L (ref 9–17)
AST SERPL-CCNC: 80 U/L
AST SERPL-CCNC: 80 U/L
BASOPHILS # BLD: 0.07 K/UL (ref 0–0.2)
BASOPHILS # BLD: 0.07 K/UL (ref 0–0.2)
BASOPHILS NFR BLD: 1 % (ref 0–2)
BASOPHILS NFR BLD: 1 % (ref 0–2)
BILIRUB SERPL-MCNC: 6.8 MG/DL (ref 0.3–1.2)
BILIRUB SERPL-MCNC: 6.8 MG/DL (ref 0.3–1.2)
BUN SERPL-MCNC: 9 MG/DL (ref 8–23)
BUN SERPL-MCNC: 9 MG/DL (ref 8–23)
CALCIUM SERPL-MCNC: 8.9 MG/DL (ref 8.6–10.4)
CALCIUM SERPL-MCNC: 8.9 MG/DL (ref 8.6–10.4)
CHLORIDE SERPL-SCNC: 102 MMOL/L (ref 98–107)
CHLORIDE SERPL-SCNC: 102 MMOL/L (ref 98–107)
CO2 SERPL-SCNC: 20 MMOL/L (ref 20–31)
CO2 SERPL-SCNC: 20 MMOL/L (ref 20–31)
CREAT SERPL-MCNC: 0.5 MG/DL (ref 0.7–1.2)
CREAT SERPL-MCNC: 0.5 MG/DL (ref 0.7–1.2)
EOSINOPHIL # BLD: 0.17 K/UL (ref 0–0.44)
EOSINOPHIL # BLD: 0.17 K/UL (ref 0–0.44)
EOSINOPHILS RELATIVE PERCENT: 3 % (ref 1–4)
EOSINOPHILS RELATIVE PERCENT: 3 % (ref 1–4)
ERYTHROCYTE [DISTWIDTH] IN BLOOD BY AUTOMATED COUNT: 16.2 % (ref 11.8–14.4)
ERYTHROCYTE [DISTWIDTH] IN BLOOD BY AUTOMATED COUNT: 16.2 % (ref 11.8–14.4)
GFR SERPL CREATININE-BSD FRML MDRD: >60 ML/MIN/1.73M2
GFR SERPL CREATININE-BSD FRML MDRD: >60 ML/MIN/1.73M2
GLUCOSE SERPL-MCNC: 85 MG/DL (ref 70–99)
GLUCOSE SERPL-MCNC: 85 MG/DL (ref 70–99)
HCT VFR BLD AUTO: 43.7 % (ref 40.7–50.3)
HCT VFR BLD AUTO: 43.7 % (ref 40.7–50.3)
HGB BLD-MCNC: 13.9 G/DL (ref 13–17)
HGB BLD-MCNC: 13.9 G/DL (ref 13–17)
IMM GRANULOCYTES # BLD AUTO: 0.08 K/UL (ref 0–0.3)
IMM GRANULOCYTES # BLD AUTO: 0.08 K/UL (ref 0–0.3)
IMM GRANULOCYTES NFR BLD: 1 %
IMM GRANULOCYTES NFR BLD: 1 %
LYMPHOCYTES NFR BLD: 1.47 K/UL (ref 1.1–3.7)
LYMPHOCYTES NFR BLD: 1.47 K/UL (ref 1.1–3.7)
LYMPHOCYTES RELATIVE PERCENT: 22 % (ref 24–43)
LYMPHOCYTES RELATIVE PERCENT: 22 % (ref 24–43)
MCH RBC QN AUTO: 29.8 PG (ref 25.2–33.5)
MCH RBC QN AUTO: 29.8 PG (ref 25.2–33.5)
MCHC RBC AUTO-ENTMCNC: 31.8 G/DL (ref 28.4–34.8)
MCHC RBC AUTO-ENTMCNC: 31.8 G/DL (ref 28.4–34.8)
MCV RBC AUTO: 93.6 FL (ref 82.6–102.9)
MCV RBC AUTO: 93.6 FL (ref 82.6–102.9)
MONOCYTES NFR BLD: 0.59 K/UL (ref 0.1–1.2)
MONOCYTES NFR BLD: 0.59 K/UL (ref 0.1–1.2)
MONOCYTES NFR BLD: 9 % (ref 3–12)
MONOCYTES NFR BLD: 9 % (ref 3–12)
NEUTROPHILS NFR BLD: 64 % (ref 36–65)
NEUTROPHILS NFR BLD: 64 % (ref 36–65)
NEUTS SEG NFR BLD: 4.34 K/UL (ref 1.5–8.1)
NEUTS SEG NFR BLD: 4.34 K/UL (ref 1.5–8.1)
NRBC BLD-RTO: 0 PER 100 WBC
NRBC BLD-RTO: 0 PER 100 WBC
PLATELET # BLD AUTO: 251 K/UL (ref 138–453)
PLATELET # BLD AUTO: 251 K/UL (ref 138–453)
PMV BLD AUTO: 10.8 FL (ref 8.1–13.5)
PMV BLD AUTO: 10.8 FL (ref 8.1–13.5)
POTASSIUM SERPL-SCNC: 3.6 MMOL/L (ref 3.7–5.3)
POTASSIUM SERPL-SCNC: 3.6 MMOL/L (ref 3.7–5.3)
PROT SERPL-MCNC: 6 G/DL (ref 6.4–8.3)
PROT SERPL-MCNC: 6 G/DL (ref 6.4–8.3)
RBC # BLD AUTO: 4.67 M/UL (ref 4.21–5.77)
RBC # BLD AUTO: 4.67 M/UL (ref 4.21–5.77)
RBC # BLD: ABNORMAL 10*6/UL
RBC # BLD: ABNORMAL 10*6/UL
SODIUM SERPL-SCNC: 134 MMOL/L (ref 135–144)
SODIUM SERPL-SCNC: 134 MMOL/L (ref 135–144)
WBC OTHER # BLD: 6.7 K/UL (ref 3.5–11.3)
WBC OTHER # BLD: 6.7 K/UL (ref 3.5–11.3)

## 2023-11-25 PROCEDURE — 6370000000 HC RX 637 (ALT 250 FOR IP): Performed by: INTERNAL MEDICINE

## 2023-11-25 PROCEDURE — 80053 COMPREHEN METABOLIC PANEL: CPT

## 2023-11-25 PROCEDURE — 6360000002 HC RX W HCPCS: Performed by: NURSE PRACTITIONER

## 2023-11-25 PROCEDURE — 99232 SBSQ HOSP IP/OBS MODERATE 35: CPT | Performed by: FAMILY MEDICINE

## 2023-11-25 PROCEDURE — 6370000000 HC RX 637 (ALT 250 FOR IP): Performed by: NURSE PRACTITIONER

## 2023-11-25 PROCEDURE — 2060000000 HC ICU INTERMEDIATE R&B

## 2023-11-25 PROCEDURE — 2580000003 HC RX 258: Performed by: NURSE PRACTITIONER

## 2023-11-25 PROCEDURE — 99232 SBSQ HOSP IP/OBS MODERATE 35: CPT | Performed by: INTERNAL MEDICINE

## 2023-11-25 PROCEDURE — 36415 COLL VENOUS BLD VENIPUNCTURE: CPT

## 2023-11-25 PROCEDURE — 85025 COMPLETE CBC W/AUTO DIFF WBC: CPT

## 2023-11-25 RX ORDER — UREA 10 %
3 LOTION (ML) TOPICAL NIGHTLY PRN
Status: DISCONTINUED | OUTPATIENT
Start: 2023-11-25 | End: 2023-12-01 | Stop reason: HOSPADM

## 2023-11-25 RX ADMIN — URSODIOL 500 MG: 250 TABLET, FILM COATED ORAL at 20:13

## 2023-11-25 RX ADMIN — SODIUM CHLORIDE, PRESERVATIVE FREE 10 ML: 5 INJECTION INTRAVENOUS at 20:14

## 2023-11-25 RX ADMIN — ENOXAPARIN SODIUM 40 MG: 100 INJECTION SUBCUTANEOUS at 10:57

## 2023-11-25 RX ADMIN — Medication 3 MG: at 01:21

## 2023-11-25 RX ADMIN — URSODIOL 500 MG: 250 TABLET, FILM COATED ORAL at 08:28

## 2023-11-25 NOTE — PLAN OF CARE
Problem: Discharge Planning  Goal: Discharge to home or other facility with appropriate resources  Outcome: Progressing  Flowsheets (Taken 11/24/2023 1924)  Discharge to home or other facility with appropriate resources: Identify barriers to discharge with patient and caregiver     Problem: Safety - Adult  Goal: Free from fall injury  Outcome: Progressing  Flowsheets (Taken 11/24/2023 1927)  Free From Fall Injury: Instruct family/caregiver on patient safety     Problem: ABCDS Injury Assessment  Goal: Absence of physical injury  Outcome: Progressing  Flowsheets (Taken 11/24/2023 1927)  Absence of Physical Injury: Implement safety measures based on patient assessment

## 2023-11-25 NOTE — PLAN OF CARE
Problem: Discharge Planning  Goal: Discharge to home or other facility with appropriate resources  11/25/2023 1153 by Margaret Montes RN  Outcome: Progressing  11/25/2023 0545 by Samira White RN  Outcome: Progressing  Flowsheets (Taken 11/24/2023 1924)  Discharge to home or other facility with appropriate resources: Identify barriers to discharge with patient and caregiver     Problem: Safety - Adult  Goal: Free from fall injury  11/25/2023 1153 by Margaret Montes RN  Outcome: Progressing  11/25/2023 0545 by Samira White RN  Outcome: Progressing  Flowsheets (Taken 11/24/2023 1927)  Free From Fall Injury: Instruct family/caregiver on patient safety     Problem: ABCDS Injury Assessment  Goal: Absence of physical injury  11/25/2023 1153 by Margaret Montes RN  Outcome: Progressing  11/25/2023 0545 by Samira White RN  Outcome: Progressing  Flowsheets (Taken 11/24/2023 1927)  Absence of Physical Injury: Implement safety measures based on patient assessment

## 2023-11-25 NOTE — PROGRESS NOTES
WBC  --  6.2   RBC  --  4.74   HGB  --  14.2   HCT  --  42.7   MCV  --  90.1   MCH  --  30.0   MCHC  --  33.3   RDW  --  17.1*   PLT  --  254   MPV  --  11.5   INR 0.9  --      Chemistry:  Recent Labs     11/23/23 0624 11/23/23 1427 11/24/23  0611   NA  --  134* 136   K  --  3.3* 3.7   CL  --  102 102   CO2  --  22 24   GLUCOSE  --  95 82   BUN  --  8 9   CREATININE  --  0.6* 0.6*   MG 2.3  --   --    ANIONGAP  --  10 10   LABGLOM  --  >60 >60   CALCIUM  --  8.4* 8.7   CAION 1.12*  --  1.18   PHOS 2.6  --   --      Recent Labs     11/23/23 0624 11/23/23 1427 11/24/23  0611 11/24/23  0740   PROT  --  5.9* 6.0*  --    LABALBU  --  3.0* 3.3*  --    AST  --  76* 70*  --    ALT  --  107* 100*  --    ALKPHOS  --  358* 363*  --    BILITOT  --  8.5* 7.5*  --    BILIDIR  --  5.6* 4.9*  --    AMMONIA  --   --   --  27   LIPASE 48  --   --   --    TRIG 138  --   --   --      ABG:No results found for: \"POCPH\", \"PHART\", \"PH\", \"POCPCO2\", \"SKW7VZZ\", \"PCO2\", \"POCPO2\", \"PO2ART\", \"PO2\", \"POCHCO3\", \"DFE0UZH\", \"HCO3\", \"NBEA\", \"PBEA\", \"BEART\", \"BE\", \"THGBART\", \"THB\", \"XUB9WKE\", \"RFVM5DRY\", \"W8NJMOPO\", \"O2SAT\", \"FIO2\"  No results found for: \"SPECIAL\"  No results found for: \"CULTURE\"    Radiology:  No results found. Physical Examination:        Physical Exam  Vitals and nursing note reviewed. Constitutional:       General: He is not in acute distress. HENT:      Head: Normocephalic and atraumatic. Eyes:      Conjunctiva/sclera: Conjunctivae normal.      Pupils: Pupils are equal, round, and reactive to light. Comments: Unable to open his R eye   Cardiovascular:      Rate and Rhythm: Normal rate and regular rhythm. Heart sounds: No murmur heard. Pulmonary:      Effort: Pulmonary effort is normal. No accessory muscle usage or respiratory distress. Breath sounds: No stridor. No decreased breath sounds, wheezing, rhonchi or rales. Abdominal:      General: Bowel sounds are normal. There is no distension.

## 2023-11-26 ENCOUNTER — APPOINTMENT (OUTPATIENT)
Dept: GENERAL RADIOLOGY | Age: 68
DRG: 417 | End: 2023-11-26
Attending: INTERNAL MEDICINE
Payer: MEDICARE

## 2023-11-26 LAB
ALBUMIN SERPL-MCNC: 3.3 G/DL (ref 3.5–5.2)
ALBUMIN SERPL-MCNC: 3.3 G/DL (ref 3.5–5.2)
ALBUMIN/GLOB SERPL: 1.1 {RATIO} (ref 1–2.5)
ALBUMIN/GLOB SERPL: 1.1 {RATIO} (ref 1–2.5)
ALP SERPL-CCNC: 362 U/L (ref 40–129)
ALP SERPL-CCNC: 362 U/L (ref 40–129)
ALT SERPL-CCNC: 96 U/L (ref 5–41)
ALT SERPL-CCNC: 96 U/L (ref 5–41)
ANION GAP SERPL CALCULATED.3IONS-SCNC: 12 MMOL/L (ref 9–17)
ANION GAP SERPL CALCULATED.3IONS-SCNC: 12 MMOL/L (ref 9–17)
AST SERPL-CCNC: 74 U/L
AST SERPL-CCNC: 74 U/L
BILIRUB SERPL-MCNC: 6.1 MG/DL (ref 0.3–1.2)
BILIRUB SERPL-MCNC: 6.1 MG/DL (ref 0.3–1.2)
BUN SERPL-MCNC: 9 MG/DL (ref 8–23)
BUN SERPL-MCNC: 9 MG/DL (ref 8–23)
CALCIUM SERPL-MCNC: 8.8 MG/DL (ref 8.6–10.4)
CALCIUM SERPL-MCNC: 8.8 MG/DL (ref 8.6–10.4)
CHLORIDE SERPL-SCNC: 100 MMOL/L (ref 98–107)
CHLORIDE SERPL-SCNC: 100 MMOL/L (ref 98–107)
CO2 SERPL-SCNC: 22 MMOL/L (ref 20–31)
CO2 SERPL-SCNC: 22 MMOL/L (ref 20–31)
CREAT SERPL-MCNC: 0.6 MG/DL (ref 0.7–1.2)
CREAT SERPL-MCNC: 0.6 MG/DL (ref 0.7–1.2)
GFR SERPL CREATININE-BSD FRML MDRD: >60 ML/MIN/1.73M2
GFR SERPL CREATININE-BSD FRML MDRD: >60 ML/MIN/1.73M2
GLUCOSE SERPL-MCNC: 83 MG/DL (ref 70–99)
GLUCOSE SERPL-MCNC: 83 MG/DL (ref 70–99)
POTASSIUM SERPL-SCNC: 4 MMOL/L (ref 3.7–5.3)
POTASSIUM SERPL-SCNC: 4 MMOL/L (ref 3.7–5.3)
PROT SERPL-MCNC: 6.4 G/DL (ref 6.4–8.3)
PROT SERPL-MCNC: 6.4 G/DL (ref 6.4–8.3)
SMOOTH MUSCLE ANTIBODY: 6 UNITS (ref 0–19)
SMOOTH MUSCLE ANTIBODY: 6 UNITS (ref 0–19)
SODIUM SERPL-SCNC: 134 MMOL/L (ref 135–144)
SODIUM SERPL-SCNC: 134 MMOL/L (ref 135–144)

## 2023-11-26 PROCEDURE — 36415 COLL VENOUS BLD VENIPUNCTURE: CPT

## 2023-11-26 PROCEDURE — 99232 SBSQ HOSP IP/OBS MODERATE 35: CPT | Performed by: FAMILY MEDICINE

## 2023-11-26 PROCEDURE — 6360000002 HC RX W HCPCS: Performed by: NURSE PRACTITIONER

## 2023-11-26 PROCEDURE — 6370000000 HC RX 637 (ALT 250 FOR IP): Performed by: INTERNAL MEDICINE

## 2023-11-26 PROCEDURE — 80053 COMPREHEN METABOLIC PANEL: CPT

## 2023-11-26 PROCEDURE — 70250 X-RAY EXAM OF SKULL: CPT

## 2023-11-26 PROCEDURE — 2060000000 HC ICU INTERMEDIATE R&B

## 2023-11-26 PROCEDURE — 2580000003 HC RX 258: Performed by: NURSE PRACTITIONER

## 2023-11-26 RX ADMIN — ENOXAPARIN SODIUM 40 MG: 100 INJECTION SUBCUTANEOUS at 08:26

## 2023-11-26 RX ADMIN — SODIUM CHLORIDE, PRESERVATIVE FREE 10 ML: 5 INJECTION INTRAVENOUS at 08:26

## 2023-11-26 RX ADMIN — URSODIOL 500 MG: 250 TABLET, FILM COATED ORAL at 20:05

## 2023-11-26 RX ADMIN — SODIUM CHLORIDE, PRESERVATIVE FREE 10 ML: 5 INJECTION INTRAVENOUS at 20:04

## 2023-11-26 RX ADMIN — URSODIOL 500 MG: 250 TABLET, FILM COATED ORAL at 08:26

## 2023-11-26 NOTE — PROGRESS NOTES
[facility]  Office: [phone]  Daja Vieira, DO, Estrella Frank, DO, Lucila Brown, DO, David Montes De Oca Blood, DO, Erin Stover MD, Nataly Miller MD, Elda Moran MD, Prince Blanton MD,  Hattie To MD, Kaylin Castro MD, Mason Hannah MD,  Kelvin Bunch MD, Peggy Celestin MD, Salazar Rodgers, DO, Bertha Theodore MD,  Celena Hawkins DO, Goyo Rocha MD, Citlalli Alvarez MD, Tiera Regalado MD, Ashwin Fontaine MD,  Radha Luna MD, Karol Moser MD, Jose Valenzuela MD, Gabbi Tubbs MD, Charmayne Ready, MD, Mendy Morris, DO, Sherman Allen DO, Jose Martin MD,  Herminio Dominguez MD, Deyanira Wiseman, CNP,  Megha Ulrich, CNP, Karon Dodson, CNP,  Randy Mar, DNP, Christi Pulliam, CNP, Donna Wiseman, CNP, Stella Pressley, CNP, Claudell Bowler, CNP, Jordon Dolan, CNP, Leila De Los Santos, [address] , Paul Hensley, CNP, Naveed Santiago, CNS, Jerry De Leon, CNP, Whitley Deluna, [address]    Progress Note    11/26/2023    12:47 PM    Name:   Gudelia Gamez  MRN:     [id number]     Acct:      [de-identified]   Room:   [address] [place] [address] Day:  4  Admit Date:  11/22/2023 10:52 PM    PCP:   No primary care provider on file. Code Status:  Full Code    Subjective:     C/C:  Jaundice    Interval History Status: improved     Patient seen and examined at bedside, no acute events overnight.   Continues to  deny any pain , itching or any other complaint   Refusing tele  Patient vitals, labs and all providers notes were reviewed,from overnight shift and morning updates were noted and discussed with the nurse    Brief History:     Per chart  Gudelia Gamez is a 74 y.o.  male who presents with jaundice  The patient was transferred from [facility] for evaluation and treatment of jaundice  Patient has underlying cognitive impairment and is unable to provide any reliable historical data  At this time patient denies any

## 2023-11-26 NOTE — PROGRESS NOTES
Per Al Bell, records requested from [place] on Friday and we will not have records until Monday. Message sent to neuro surgery regarding consult. NP to look over chart and recommends skull xray. Xray ordered.

## 2023-11-26 NOTE — PROGRESS NOTES
Message sent via private message regarding pt hr of 45 while sleep. Pt is easily arousalable  and unsymptomatic. No new orders will continue to monitor.

## 2023-11-26 NOTE — PROGRESS NOTES
Message sent to Dr. Estela Mclain regarding neurosurgery consult. Consult not completed at time consult order was placed. Dr. Pancho Roberts also notified and deferred to GI.

## 2023-11-26 NOTE — PROGRESS NOTES
Per Dianne Varela NP, Dr. Radha Carrillo received okay for MRI. Writer called MRI. Per MRI, per policy, model is needed. Guardian provided imaging results from [facility].

## 2023-11-26 NOTE — PLAN OF CARE
Problem: Discharge Planning  Goal: Discharge to home or other facility with appropriate resources  11/26/2023 0822 by Mohit Houser RN  Outcome: Progressing  11/26/2023 0543 by Alexx Dai RN  Outcome: Progressing     Problem: Safety - Adult  Goal: Free from fall injury  11/26/2023 0822 by Mohit Houser RN  Outcome: Progressing  11/26/2023 0543 by Alexx Dai RN  Outcome: Progressing     Problem: ABCDS Injury Assessment  Goal: Absence of physical injury  11/26/2023 0822 by Mohit Houser RN  Outcome: Progressing  11/26/2023 0543 by Alexx Dai RN  Outcome: Progressing

## 2023-11-26 NOTE — PLAN OF CARE
GI consulted for obstructive jaundice -     - CT A/P (out lying facility) showing intrahepatic and extrahepatic strictures, possible primary sclerosing cholangitis. Per records - -Patient with history of head injury with  shunt placement. Patient has dementia and cannot provide history. ..  Lab Results   Component Value Date    ALT 96 (H) 11/26/2023    AST 74 (H) 11/26/2023    ALKPHOS 362 (H) 11/26/2023    BILITOT 6.1 (H) 11/26/2023         Plan of care:   Awaiting  shunt records from [facility] to see if shunt is MRI compatible. Neurosurgery has been consulted to assist patient. If cleared by neurology, will need MRI to rule out cholangiocarcinoma. If MRI/MRCP cannot be performed due to  shunt, then may require ERCP +/-spyglass. Maya Harvey, APRN - CNP

## 2023-11-27 ENCOUNTER — APPOINTMENT (OUTPATIENT)
Dept: GENERAL RADIOLOGY | Age: 68
DRG: 417 | End: 2023-11-27
Attending: INTERNAL MEDICINE
Payer: MEDICARE

## 2023-11-27 ENCOUNTER — APPOINTMENT (OUTPATIENT)
Dept: MRI IMAGING | Age: 68
DRG: 417 | End: 2023-11-27
Attending: INTERNAL MEDICINE
Payer: MEDICARE

## 2023-11-27 PROBLEM — R79.89 ABNORMAL LFTS: Status: ACTIVE | Noted: 2023-11-27

## 2023-11-27 PROBLEM — Z98.2 S/P VP SHUNT: Status: ACTIVE | Noted: 2023-11-27

## 2023-11-27 PROBLEM — K83.1 OBSTRUCTION OF BILE DUCT: Status: ACTIVE | Noted: 2023-11-27

## 2023-11-27 PROBLEM — Z87.820 H/O TRAUMATIC BRAIN INJURY: Status: ACTIVE | Noted: 2023-11-27

## 2023-11-27 LAB
ANA SER QL IA: NEGATIVE
ANA SER QL IA: NEGATIVE
DSDNA IGG SER QL IA: 2.3 IU/ML
DSDNA IGG SER QL IA: 2.3 IU/ML
IGG 1: 757 MG/DL (ref 240–1118)
IGG 1: 757 MG/DL (ref 240–1118)
IGG 2: 268 MG/DL (ref 124–549)
IGG 2: 268 MG/DL (ref 124–549)
IGG 3: 31 MG/DL (ref 21–134)
IGG 3: 31 MG/DL (ref 21–134)
IGG4 SER-MCNC: 16 MG/DL (ref 1–123)
IGG4 SER-MCNC: 16 MG/DL (ref 1–123)
NUCLEAR IGG SER IA-RTO: 0.2 U/ML
NUCLEAR IGG SER IA-RTO: 0.2 U/ML

## 2023-11-27 PROCEDURE — 74183 MRI ABD W/O CNTR FLWD CNTR: CPT

## 2023-11-27 PROCEDURE — 97535 SELF CARE MNGMENT TRAINING: CPT

## 2023-11-27 PROCEDURE — 99232 SBSQ HOSP IP/OBS MODERATE 35: CPT | Performed by: FAMILY MEDICINE

## 2023-11-27 PROCEDURE — 6360000004 HC RX CONTRAST MEDICATION: Performed by: REGISTERED NURSE

## 2023-11-27 PROCEDURE — 6360000002 HC RX W HCPCS: Performed by: NURSE PRACTITIONER

## 2023-11-27 PROCEDURE — A9579 GAD-BASE MR CONTRAST NOS,1ML: HCPCS | Performed by: REGISTERED NURSE

## 2023-11-27 PROCEDURE — 6370000000 HC RX 637 (ALT 250 FOR IP): Performed by: INTERNAL MEDICINE

## 2023-11-27 PROCEDURE — 70250 X-RAY EXAM OF SKULL: CPT

## 2023-11-27 PROCEDURE — APPSS45 APP SPLIT SHARED TIME 31-45 MINUTES: Performed by: INTERNAL MEDICINE

## 2023-11-27 PROCEDURE — 99232 SBSQ HOSP IP/OBS MODERATE 35: CPT | Performed by: INTERNAL MEDICINE

## 2023-11-27 PROCEDURE — 87040 BLOOD CULTURE FOR BACTERIA: CPT

## 2023-11-27 PROCEDURE — 2060000000 HC ICU INTERMEDIATE R&B

## 2023-11-27 PROCEDURE — 71046 X-RAY EXAM CHEST 2 VIEWS: CPT

## 2023-11-27 PROCEDURE — 74018 RADEX ABDOMEN 1 VIEW: CPT

## 2023-11-27 PROCEDURE — 36415 COLL VENOUS BLD VENIPUNCTURE: CPT

## 2023-11-27 PROCEDURE — 2580000003 HC RX 258: Performed by: NURSE PRACTITIONER

## 2023-11-27 RX ORDER — 0.9 % SODIUM CHLORIDE 0.9 %
20 INTRAVENOUS SOLUTION INTRAVENOUS ONCE
Status: DISCONTINUED | OUTPATIENT
Start: 2023-11-27 | End: 2023-12-01 | Stop reason: HOSPADM

## 2023-11-27 RX ADMIN — ENOXAPARIN SODIUM 40 MG: 100 INJECTION SUBCUTANEOUS at 07:54

## 2023-11-27 RX ADMIN — URSODIOL 500 MG: 250 TABLET, FILM COATED ORAL at 07:54

## 2023-11-27 RX ADMIN — GADOTERIDOL 14 ML: 279.3 INJECTION, SOLUTION INTRAVENOUS at 18:36

## 2023-11-27 RX ADMIN — Medication 20 ML: at 18:36

## 2023-11-27 RX ADMIN — SODIUM CHLORIDE, PRESERVATIVE FREE 10 ML: 5 INJECTION INTRAVENOUS at 07:59

## 2023-11-27 RX ADMIN — URSODIOL 500 MG: 250 TABLET, FILM COATED ORAL at 20:12

## 2023-11-27 RX ADMIN — SODIUM CHLORIDE, PRESERVATIVE FREE 10 ML: 5 INJECTION INTRAVENOUS at 20:12

## 2023-11-27 NOTE — CARE COORDINATION
Transitional Planning:  Attempted to contact POA re: [facility] choices. Reach vm. Mailbox is full. No message can be left.

## 2023-11-27 NOTE — PLAN OF CARE
Problem: Discharge Planning  Goal: Discharge to home or other facility with appropriate resources  11/27/2023 0817 by Ellie Webb RN  Outcome: Progressing  11/26/2023 2032 by Brenna Pena RN  Outcome: Progressing     Problem: Safety - Adult  Goal: Free from fall injury  11/27/2023 0817 by Ellie Webb RN  Outcome: Progressing  11/26/2023 2032 by Brenna Pena RN  Outcome: Progressing     Problem: ABCDS Injury Assessment  Goal: Absence of physical injury  11/27/2023 0817 by Ellie Webb RN  Outcome: Progressing  11/26/2023 2032 by Brenna Pena RN  Outcome: Progressing

## 2023-11-27 NOTE — PROGRESS NOTES
Occupational Therapy  Facility/Department: [facility]  Occupational Therapy Daily Treatment Note    Name: Kayli Lawton  : 1955  MRN: [id number]  Date of Service: 2023    Discharge Recommendations:  Patient would benefit from continued therapy after discharge in order to increase pt strength, safety and independence with ADL tasks and functional transfers. Discussed with OTR to update frequency          Patient Diagnosis(es): There were no encounter diagnoses. Past Medical History:  has a past medical history of Dementia ([address] [facility]). Past Surgical History:  has no past surgical history on file. Assessment   Performance deficits / Impairments: Decreased functional mobility ; Decreased ADL status; Decreased cognition;Decreased safe awareness;Decreased strength;Decreased balance  Prognosis: Good  REQUIRES OT FOLLOW-UP: Yes  Activity Tolerance  Activity Tolerance: Patient Tolerated treatment well;Treatment limited secondary to decreased cognition        Plan   Occupational Therapy Plan  Times Per Week: 4-5x/week  Current Treatment Recommendations: Balance training, Functional mobility training, Endurance training, Neuromuscular re-education, Equipment evaluation, education, & procurement, Patient/Caregiver education & training, Safety education & training, Self-Care / ADL, Cognitive/Perceptual training, Coordination training, Pain management     Restrictions  Restrictions/Precautions  Restrictions/Precautions: Up as Tolerated, Fall Risk, NPO  Required Braces or Orthoses?: No  Position Activity Restriction  Other position/activity restrictions: hx of TBI and dementia; unable to open R eye-chronic    Subjective   General  Chart Reviewed: Yes  Patient assessed for rehabilitation services?: Yes  Family / Caregiver Present: No  Diagnosis: Per EMR:  Patient is a 74 y.o.  Male who presented from [facility] (no caregiver present, information taken primarily from [facility], [address] [place] [address] with PMHx Head over  Education Method: Verbal  Barriers to Learning: Cognition  Education Outcome: Continued education needed      AM-PAC Score        AM-PAC Inpatient Daily Activity Raw Score: 19 (11/27/23 1533)  AM-PAC Inpatient ADL T-Scale Score : 40.22 (11/27/23 1533)  ADL Inpatient CMS 0-100% Score: 42.8 (11/27/23 1533)  ADL Inpatient CMS G-Code Modifier : CK (11/27/23 1533)      Goals  Short Term Goals  Time Frame for Short Term Goals: By DC, Pt will -  Short Term Goal 1: Demo UB & LB ADLs with Supervision Assist & Fair+ Integration of EC // Activity Pacing. Short Term Goal 2: Complete Bathroom // ADL // Functional Transfers with Mod I with Good Accuracy with DME. Short Term Goal 3: Demo Functional Mobility (in-room // in-bathroom // in-home negotiation) with Mod I with Good Balance & Safety.        Therapy Time   Individual Concurrent Group Co-treatment   Time In 9814         Time Out 1511         Minutes 29         Timed Code Treatment Minutes: [address], [facility]/L

## 2023-11-27 NOTE — PROGRESS NOTES
[facility]  Office: [phone]  Daryl Bill, DO, Chad Curet, DO, Froy Harris, DO, Corine Kelly, DO, Alisia Lui MD, Asael Valenzuela MD, Jemma Rodrigues MD, Marybeth Sorenson MD,  Mabel Dinh MD, Estefania Liu MD, Kvng Rangel MD,  Ghada Schroeder MD, Lisseth Romeo MD, Tona Fernández DO, Samuel Owen MD,  Rahat Jaime DO, Sanket Conway MD, Jose F Steel MD, Destini Greene MD, Aj Ching MD,  Timmy Carreno MD, Mukund Barron MD, Debbie Deng MD, Heather Zambrano MD, Gorge Stoddard MD, Hernan Martinez, DO, Simona Onofre DO, Philis Halsted, MD,  Kristi Lester MD, Bisi Amaro, CNP,  Aracelis Swann, CNP, Adán Jones, CNP,  Cheryle Balding, [facility], Valdemar Peabody, CNP, Dave Judd, CNP, Elayne Raymond, CNP, Terese Marino, CNP, Daija Daigle, CNP, Alana Kelley, [facility], Kristal Aguilar CNP, Tammi Parikh, [facility], Maximilian Govea, CNP, Citlalli Quezada, [address]    Progress Note    11/27/2023    1:48 PM    Name:   Tamia Arana  MRN:     [id number]     Acct:      [de-identified]   Room:   [address] [place] [address] Day:  5  Admit Date:  11/22/2023 10:52 PM    PCP:   No primary care provider on file. Code Status:  Full Code    Subjective:     C/C:  Jaundice    Interval History Status: improved     Patient seen and examined at bedside, no acute events overnight.   Continues to  deny any pain , pleasant    Refusing tele  Patient vitals, labs and all providers notes were reviewed,from overnight shift and morning updates were noted and discussed with the nurse    Brief History:     Per chart  Tamia Arana is a 74 y.o.  male who presents with jaundice  The patient was transferred from [facility] for evaluation and treatment of jaundice  Patient has underlying cognitive impairment and is unable to provide any reliable historical data  At this time patient denies any abdominal pain  He

## 2023-11-27 NOTE — PROGRESS NOTES
\"[id number]\", \"FOLATE\", \"OCCULTBLD\" in the last 72 hours. BMP  Recent Labs     11/25/23  0809 11/26/23  0658   * 134*   K 3.6* 4.0    100   CO2 20 22   BUN 9 9   CREATININE 0.5* 0.6*   GLUCOSE 85 83   CALCIUM 8.9 8.8       LFTS  Recent Labs     11/25/23  0809 11/26/23  0658   ALKPHOS 362* 362*   ALT 98* 96*   AST 80* 74*   BILITOT 6.8* 6.1*   LABALBU 3.1* 3.3*       AMYLASE/LIPASE/AMMONIA  No results for input(s): \"AMYLASE\", \"LIPASE\", \"AMMONIA\" in the last 72 hours. Acute Hepatitis Panel   No results found for: \"HEPBSAG\", \"HEPCAB\", \"HEPBIGM\", \"HEPAIGM\"    HCV Genotype   No components found for: \"HEPATITISCGENOTYPE\"    HCV Quantitative   No results found for: \"HCVQNT\"    LIVER WORK UP:    AFP  No results found for: \"AFP\"    Alpha 1 antitrypsin   No results found for: \"A1A\"    Anti - Liver/Kidney Ab  No results found for: \"LIVER-KIDNEYMICROSOMALAB\"    TAMIKO  Lab Results   Component Value Date/Time    TAMIKO NEGATIVE 11/24/2023 05:39 PM       AMA  No results found for: \"MITOAB\"    ASMA  Lab Results   Component Value Date/Time    SMOOTHMUSCAB 6 11/24/2023 05:39 PM       Ceruloplasmin  No results found for: \"CERULOPLSM\"    Celiac panel  No results found for: \"TISSTRNTIIGG\", \"TTGIGA\", \"IGA\"    IgG  No results found for: \"IGG\"    IgM  Lab Results   Component Value Date/Time    IGM 82 11/24/2023 05:39 PM       GGT   No results found for: \"LABGGT\"    PT/INR  No results for input(s): \"PROTIME\", \"INR\" in the last 72 hours. Cancer Markers:  CEA:    Lab Results   Component Value Date/Time    CEA 1.6 11/24/2023 05:39 PM     Ca 125:  No results found for: \"\"  Ca 19-9:     Lab Results   Component Value Date/Time     650 11/24/2023 05:39 PM     AFP: No results found for: \"AFP\"    Lactic acid:No results for input(s): \"LACTACIDWB\" in the last 72 hours. Radiology Review:    . Dolly Moore XR SKULL (<4 VIEWS)   Final Result   Shunt tube from a right parietal approach that is intact.   Correlate with   expected errors in transcription may have occurred.

## 2023-11-27 NOTE — PLAN OF CARE
Problem: Discharge Planning  Goal: Discharge to home or other facility with appropriate resources  11/26/2023 2032 by Carlito Estrada RN  Outcome: Progressing  11/26/2023 0822 by Ney Raza RN  Outcome: Progressing     Problem: Safety - Adult  Goal: Free from fall injury  11/26/2023 2032 by Carlito Estrada RN  Outcome: Progressing  11/26/2023 0822 by Ney Raza RN  Outcome: Progressing

## 2023-11-27 NOTE — PROGRESS NOTES
Writer received call regarding blood cultures from [place]. Results faxed to [facility]. Blood cultures positive of staphylococcus capitis. Dr. Virgil Ingram notified.

## 2023-11-28 ENCOUNTER — APPOINTMENT (OUTPATIENT)
Dept: GENERAL RADIOLOGY | Age: 68
DRG: 417 | End: 2023-11-28
Attending: INTERNAL MEDICINE
Payer: MEDICARE

## 2023-11-28 PROBLEM — K83.8 DILATED CBD, ACQUIRED: Status: ACTIVE | Noted: 2023-11-28

## 2023-11-28 LAB
ALBUMIN SERPL-MCNC: 3.2 G/DL (ref 3.5–5.2)
ALBUMIN SERPL-MCNC: 3.2 G/DL (ref 3.5–5.2)
ALBUMIN/GLOB SERPL: 1.1 {RATIO} (ref 1–2.5)
ALBUMIN/GLOB SERPL: 1.1 {RATIO} (ref 1–2.5)
ALP SERPL-CCNC: 300 U/L (ref 40–129)
ALP SERPL-CCNC: 300 U/L (ref 40–129)
ALT SERPL-CCNC: 76 U/L (ref 5–41)
ALT SERPL-CCNC: 76 U/L (ref 5–41)
ANCA MYELOPEROXIDASE: 0.3 AU/ML (ref 0–3.5)
ANCA MYELOPEROXIDASE: 0.3 AU/ML (ref 0–3.5)
ANCA PROTEINASE 3: <0.7 AU/ML (ref 0–2)
ANCA PROTEINASE 3: <0.7 AU/ML (ref 0–2)
ANION GAP SERPL CALCULATED.3IONS-SCNC: 11 MMOL/L (ref 9–17)
ANION GAP SERPL CALCULATED.3IONS-SCNC: 11 MMOL/L (ref 9–17)
AST SERPL-CCNC: 50 U/L
AST SERPL-CCNC: 50 U/L
BASOPHILS # BLD: 0.08 K/UL (ref 0–0.2)
BASOPHILS # BLD: 0.08 K/UL (ref 0–0.2)
BASOPHILS NFR BLD: 1 % (ref 0–2)
BASOPHILS NFR BLD: 1 % (ref 0–2)
BILIRUB SERPL-MCNC: 4.2 MG/DL (ref 0.3–1.2)
BILIRUB SERPL-MCNC: 4.2 MG/DL (ref 0.3–1.2)
BUN SERPL-MCNC: 11 MG/DL (ref 8–23)
BUN SERPL-MCNC: 11 MG/DL (ref 8–23)
CALCIUM SERPL-MCNC: 8.5 MG/DL (ref 8.6–10.4)
CALCIUM SERPL-MCNC: 8.5 MG/DL (ref 8.6–10.4)
CHLORIDE SERPL-SCNC: 105 MMOL/L (ref 98–107)
CHLORIDE SERPL-SCNC: 105 MMOL/L (ref 98–107)
CO2 SERPL-SCNC: 22 MMOL/L (ref 20–31)
CO2 SERPL-SCNC: 22 MMOL/L (ref 20–31)
CREAT SERPL-MCNC: 0.7 MG/DL (ref 0.7–1.2)
CREAT SERPL-MCNC: 0.7 MG/DL (ref 0.7–1.2)
EOSINOPHIL # BLD: 0.18 K/UL (ref 0–0.44)
EOSINOPHIL # BLD: 0.18 K/UL (ref 0–0.44)
EOSINOPHILS RELATIVE PERCENT: 3 % (ref 1–4)
EOSINOPHILS RELATIVE PERCENT: 3 % (ref 1–4)
ERYTHROCYTE [DISTWIDTH] IN BLOOD BY AUTOMATED COUNT: 15.9 % (ref 11.8–14.4)
ERYTHROCYTE [DISTWIDTH] IN BLOOD BY AUTOMATED COUNT: 15.9 % (ref 11.8–14.4)
GFR SERPL CREATININE-BSD FRML MDRD: >60 ML/MIN/1.73M2
GFR SERPL CREATININE-BSD FRML MDRD: >60 ML/MIN/1.73M2
GLUCOSE SERPL-MCNC: 82 MG/DL (ref 70–99)
GLUCOSE SERPL-MCNC: 82 MG/DL (ref 70–99)
HCT VFR BLD AUTO: 43.3 % (ref 40.7–50.3)
HCT VFR BLD AUTO: 43.3 % (ref 40.7–50.3)
HGB BLD-MCNC: 13.7 G/DL (ref 13–17)
HGB BLD-MCNC: 13.7 G/DL (ref 13–17)
IMM GRANULOCYTES # BLD AUTO: 0.06 K/UL (ref 0–0.3)
IMM GRANULOCYTES # BLD AUTO: 0.06 K/UL (ref 0–0.3)
IMM GRANULOCYTES NFR BLD: 1 %
IMM GRANULOCYTES NFR BLD: 1 %
LYMPHOCYTES NFR BLD: 1.84 K/UL (ref 1.1–3.7)
LYMPHOCYTES NFR BLD: 1.84 K/UL (ref 1.1–3.7)
LYMPHOCYTES RELATIVE PERCENT: 28 % (ref 24–43)
LYMPHOCYTES RELATIVE PERCENT: 28 % (ref 24–43)
MCH RBC QN AUTO: 29.8 PG (ref 25.2–33.5)
MCH RBC QN AUTO: 29.8 PG (ref 25.2–33.5)
MCHC RBC AUTO-ENTMCNC: 31.6 G/DL (ref 28.4–34.8)
MCHC RBC AUTO-ENTMCNC: 31.6 G/DL (ref 28.4–34.8)
MCV RBC AUTO: 94.3 FL (ref 82.6–102.9)
MCV RBC AUTO: 94.3 FL (ref 82.6–102.9)
MITOCHONDRIA M2 IGG SER-ACNC: 4.2 U/ML (ref 0–4)
MITOCHONDRIA M2 IGG SER-ACNC: 4.2 U/ML (ref 0–4)
MONOCYTES NFR BLD: 0.66 K/UL (ref 0.1–1.2)
MONOCYTES NFR BLD: 0.66 K/UL (ref 0.1–1.2)
MONOCYTES NFR BLD: 10 % (ref 3–12)
MONOCYTES NFR BLD: 10 % (ref 3–12)
NEUTROPHILS NFR BLD: 57 % (ref 36–65)
NEUTROPHILS NFR BLD: 57 % (ref 36–65)
NEUTS SEG NFR BLD: 3.77 K/UL (ref 1.5–8.1)
NEUTS SEG NFR BLD: 3.77 K/UL (ref 1.5–8.1)
NRBC BLD-RTO: 0 PER 100 WBC
NRBC BLD-RTO: 0 PER 100 WBC
PLATELET # BLD AUTO: 270 K/UL (ref 138–453)
PLATELET # BLD AUTO: 270 K/UL (ref 138–453)
PMV BLD AUTO: 10.6 FL (ref 8.1–13.5)
PMV BLD AUTO: 10.6 FL (ref 8.1–13.5)
POTASSIUM SERPL-SCNC: 4.2 MMOL/L (ref 3.7–5.3)
POTASSIUM SERPL-SCNC: 4.2 MMOL/L (ref 3.7–5.3)
PROT SERPL-MCNC: 6.2 G/DL (ref 6.4–8.3)
PROT SERPL-MCNC: 6.2 G/DL (ref 6.4–8.3)
RBC # BLD AUTO: 4.59 M/UL (ref 4.21–5.77)
RBC # BLD AUTO: 4.59 M/UL (ref 4.21–5.77)
RBC # BLD: ABNORMAL 10*6/UL
RBC # BLD: ABNORMAL 10*6/UL
SODIUM SERPL-SCNC: 138 MMOL/L (ref 135–144)
SODIUM SERPL-SCNC: 138 MMOL/L (ref 135–144)
WBC OTHER # BLD: 6.6 K/UL (ref 3.5–11.3)
WBC OTHER # BLD: 6.6 K/UL (ref 3.5–11.3)

## 2023-11-28 PROCEDURE — 6370000000 HC RX 637 (ALT 250 FOR IP): Performed by: INTERNAL MEDICINE

## 2023-11-28 PROCEDURE — 85025 COMPLETE CBC W/AUTO DIFF WBC: CPT

## 2023-11-28 PROCEDURE — 36415 COLL VENOUS BLD VENIPUNCTURE: CPT

## 2023-11-28 PROCEDURE — APPSS45 APP SPLIT SHARED TIME 31-45 MINUTES: Performed by: INTERNAL MEDICINE

## 2023-11-28 PROCEDURE — 99232 SBSQ HOSP IP/OBS MODERATE 35: CPT | Performed by: INTERNAL MEDICINE

## 2023-11-28 PROCEDURE — 99232 SBSQ HOSP IP/OBS MODERATE 35: CPT | Performed by: STUDENT IN AN ORGANIZED HEALTH CARE EDUCATION/TRAINING PROGRAM

## 2023-11-28 PROCEDURE — 2060000000 HC ICU INTERMEDIATE R&B

## 2023-11-28 PROCEDURE — 70250 X-RAY EXAM OF SKULL: CPT

## 2023-11-28 PROCEDURE — 2580000003 HC RX 258: Performed by: NURSE PRACTITIONER

## 2023-11-28 PROCEDURE — 80053 COMPREHEN METABOLIC PANEL: CPT

## 2023-11-28 PROCEDURE — 6360000002 HC RX W HCPCS: Performed by: NURSE PRACTITIONER

## 2023-11-28 RX ADMIN — SODIUM CHLORIDE, PRESERVATIVE FREE 10 ML: 5 INJECTION INTRAVENOUS at 07:57

## 2023-11-28 RX ADMIN — URSODIOL 500 MG: 250 TABLET, FILM COATED ORAL at 20:50

## 2023-11-28 RX ADMIN — ENOXAPARIN SODIUM 40 MG: 100 INJECTION SUBCUTANEOUS at 07:57

## 2023-11-28 RX ADMIN — SODIUM CHLORIDE, PRESERVATIVE FREE 10 ML: 5 INJECTION INTRAVENOUS at 20:50

## 2023-11-28 RX ADMIN — URSODIOL 500 MG: 250 TABLET, FILM COATED ORAL at 07:57

## 2023-11-28 NOTE — CARE COORDINATION
Spoke with guardian Shabnam Said, [facility]. needs at this time. Plan to return home with her at discharge.

## 2023-11-28 NOTE — PROGRESS NOTES
The patient's caregiver Asael Ricci updated on plan of care. ERCP tomorrow 11/29/2023 and repeat x-rays of the patient's skull. Asael Ricci expressing concerns about the mass seen on the kidney from the MRCP. She is asking that this be addressed with urology while the patient is here.

## 2023-11-28 NOTE — PLAN OF CARE
Problem: Discharge Planning  Goal: Discharge to home or other facility with appropriate resources  11/27/2023 2056 by Thais Che RN  Outcome: Progressing  11/27/2023 0817 by Colette Hdz RN  Outcome: Progressing     Problem: Safety - Adult  Goal: Free from fall injury  11/27/2023 2056 by Thais Che RN  Outcome: Progressing  11/27/2023 0817 by Colette Hdz RN  Outcome: Progressing     Problem: ABCDS Injury Assessment  Goal: Absence of physical injury  11/27/2023 2056 by Thais Che RN  Outcome: Progressing  11/27/2023 0817 by Colette Hdz RN  Outcome: Progressing

## 2023-11-28 NOTE — PLAN OF CARE
Problem: Discharge Planning  Goal: Discharge to home or other facility with appropriate resources  11/28/2023 0746 by Alta Galarza RN  Outcome: Progressing  11/27/2023 2056 by Bhavana Mora RN  Outcome: Progressing     Problem: Safety - Adult  Goal: Free from fall injury  11/28/2023 0746 by Alta Galarza RN  Outcome: Progressing  11/27/2023 2056 by Bhavana Mora RN  Outcome: Progressing     Problem: ABCDS Injury Assessment  Goal: Absence of physical injury  11/28/2023 0746 by Alta Galarza RN  Outcome: Progressing  11/27/2023 2056 by Bhavana Mora RN  Outcome: Progressing

## 2023-11-29 ENCOUNTER — ANESTHESIA (OUTPATIENT)
Dept: OPERATING ROOM | Age: 68
End: 2023-11-29
Payer: MEDICARE

## 2023-11-29 ENCOUNTER — APPOINTMENT (OUTPATIENT)
Dept: ULTRASOUND IMAGING | Age: 68
DRG: 417 | End: 2023-11-29
Attending: INTERNAL MEDICINE
Payer: MEDICARE

## 2023-11-29 ENCOUNTER — APPOINTMENT (OUTPATIENT)
Dept: GENERAL RADIOLOGY | Age: 68
DRG: 417 | End: 2023-11-29
Attending: INTERNAL MEDICINE
Payer: MEDICARE

## 2023-11-29 ENCOUNTER — ANESTHESIA EVENT (OUTPATIENT)
Dept: OPERATING ROOM | Age: 68
End: 2023-11-29
Payer: MEDICARE

## 2023-11-29 PROCEDURE — 7100000001 HC PACU RECOVERY - ADDTL 15 MIN: Performed by: INTERNAL MEDICINE

## 2023-11-29 PROCEDURE — 6370000000 HC RX 637 (ALT 250 FOR IP): Performed by: INTERNAL MEDICINE

## 2023-11-29 PROCEDURE — 76975 GI ENDOSCOPIC ULTRASOUND: CPT | Performed by: INTERNAL MEDICINE

## 2023-11-29 PROCEDURE — 7100000000 HC PACU RECOVERY - FIRST 15 MIN: Performed by: INTERNAL MEDICINE

## 2023-11-29 PROCEDURE — 43262 ENDO CHOLANGIOPANCREATOGRAPH: CPT | Performed by: INTERNAL MEDICINE

## 2023-11-29 PROCEDURE — 6360000004 HC RX CONTRAST MEDICATION: Performed by: INTERNAL MEDICINE

## 2023-11-29 PROCEDURE — 0F998ZX DRAINAGE OF COMMON BILE DUCT, VIA NATURAL OR ARTIFICIAL OPENING ENDOSCOPIC, DIAGNOSTIC: ICD-10-PCS | Performed by: INTERNAL MEDICINE

## 2023-11-29 PROCEDURE — 43239 EGD BIOPSY SINGLE/MULTIPLE: CPT | Performed by: INTERNAL MEDICINE

## 2023-11-29 PROCEDURE — 99232 SBSQ HOSP IP/OBS MODERATE 35: CPT | Performed by: STUDENT IN AN ORGANIZED HEALTH CARE EDUCATION/TRAINING PROGRAM

## 2023-11-29 PROCEDURE — 0FC98ZZ EXTIRPATION OF MATTER FROM COMMON BILE DUCT, VIA NATURAL OR ARTIFICIAL OPENING ENDOSCOPIC: ICD-10-PCS | Performed by: INTERNAL MEDICINE

## 2023-11-29 PROCEDURE — 1200000000 HC SEMI PRIVATE

## 2023-11-29 PROCEDURE — 3700000000 HC ANESTHESIA ATTENDED CARE: Performed by: INTERNAL MEDICINE

## 2023-11-29 PROCEDURE — 0FC58ZZ EXTIRPATION OF MATTER FROM RIGHT HEPATIC DUCT, VIA NATURAL OR ARTIFICIAL OPENING ENDOSCOPIC: ICD-10-PCS | Performed by: INTERNAL MEDICINE

## 2023-11-29 PROCEDURE — 88305 TISSUE EXAM BY PATHOLOGIST: CPT

## 2023-11-29 PROCEDURE — 2720000010 HC SURG SUPPLY STERILE: Performed by: INTERNAL MEDICINE

## 2023-11-29 PROCEDURE — 3609012400 HC EGD TRANSORAL BIOPSY SINGLE/MULTIPLE: Performed by: INTERNAL MEDICINE

## 2023-11-29 PROCEDURE — 3609015200 HC ERCP REMOVE CALCULI/DEBRIS BILIARY/PANCREAS DUCT: Performed by: INTERNAL MEDICINE

## 2023-11-29 PROCEDURE — 3609014900 HC ERCP W/SPHINCTEROTOMY &/OR PAPILLOTOMY: Performed by: INTERNAL MEDICINE

## 2023-11-29 PROCEDURE — 0FC67ZZ EXTIRPATION OF MATTER FROM LEFT HEPATIC DUCT, VIA NATURAL OR ARTIFICIAL OPENING: ICD-10-PCS | Performed by: INTERNAL MEDICINE

## 2023-11-29 PROCEDURE — 2500000003 HC RX 250 WO HCPCS: Performed by: NURSE ANESTHETIST, CERTIFIED REGISTERED

## 2023-11-29 PROCEDURE — 2580000003 HC RX 258: Performed by: NURSE PRACTITIONER

## 2023-11-29 PROCEDURE — C1769 GUIDE WIRE: HCPCS | Performed by: INTERNAL MEDICINE

## 2023-11-29 PROCEDURE — 88173 CYTOPATH EVAL FNA REPORT: CPT

## 2023-11-29 PROCEDURE — 07DD3ZX EXTRACTION OF AORTIC LYMPHATIC, PERCUTANEOUS APPROACH, DIAGNOSTIC: ICD-10-PCS | Performed by: INTERNAL MEDICINE

## 2023-11-29 PROCEDURE — 3700000001 HC ADD 15 MINUTES (ANESTHESIA): Performed by: INTERNAL MEDICINE

## 2023-11-29 PROCEDURE — 0DB98ZX EXCISION OF DUODENUM, VIA NATURAL OR ARTIFICIAL OPENING ENDOSCOPIC, DIAGNOSTIC: ICD-10-PCS | Performed by: INTERNAL MEDICINE

## 2023-11-29 PROCEDURE — 0DB68ZX EXCISION OF STOMACH, VIA NATURAL OR ARTIFICIAL OPENING ENDOSCOPIC, DIAGNOSTIC: ICD-10-PCS | Performed by: INTERNAL MEDICINE

## 2023-11-29 PROCEDURE — 2709999900 HC NON-CHARGEABLE SUPPLY: Performed by: INTERNAL MEDICINE

## 2023-11-29 PROCEDURE — 2580000003 HC RX 258: Performed by: INTERNAL MEDICINE

## 2023-11-29 PROCEDURE — 3609020800 HC EGD W/EUS FNA: Performed by: INTERNAL MEDICINE

## 2023-11-29 PROCEDURE — 6360000002 HC RX W HCPCS: Performed by: NURSE ANESTHETIST, CERTIFIED REGISTERED

## 2023-11-29 PROCEDURE — 43264 ERCP REMOVE DUCT CALCULI: CPT | Performed by: INTERNAL MEDICINE

## 2023-11-29 PROCEDURE — 0F978ZX DRAINAGE OF COMMON HEPATIC DUCT, VIA NATURAL OR ARTIFICIAL OPENING ENDOSCOPIC, DIAGNOSTIC: ICD-10-PCS | Performed by: INTERNAL MEDICINE

## 2023-11-29 PROCEDURE — 43242 EGD US FINE NEEDLE BX/ASPIR: CPT | Performed by: INTERNAL MEDICINE

## 2023-11-29 RX ORDER — SODIUM CHLORIDE 0.9 % (FLUSH) 0.9 %
5-40 SYRINGE (ML) INJECTION EVERY 12 HOURS SCHEDULED
Status: CANCELLED | OUTPATIENT
Start: 2023-11-29

## 2023-11-29 RX ORDER — ROCURONIUM BROMIDE 10 MG/ML
INJECTION, SOLUTION INTRAVENOUS PRN
Status: DISCONTINUED | OUTPATIENT
Start: 2023-11-29 | End: 2023-11-29 | Stop reason: SDUPTHER

## 2023-11-29 RX ORDER — LORAZEPAM 2 MG/ML
0.5 INJECTION INTRAMUSCULAR
Status: CANCELLED | OUTPATIENT
Start: 2023-11-29 | End: 2023-11-30

## 2023-11-29 RX ORDER — DIPHENHYDRAMINE HYDROCHLORIDE 50 MG/ML
12.5 INJECTION INTRAMUSCULAR; INTRAVENOUS
Status: CANCELLED | OUTPATIENT
Start: 2023-11-29 | End: 2023-11-30

## 2023-11-29 RX ORDER — MIDAZOLAM HYDROCHLORIDE 1 MG/ML
INJECTION INTRAMUSCULAR; INTRAVENOUS PRN
Status: DISCONTINUED | OUTPATIENT
Start: 2023-11-29 | End: 2023-11-29 | Stop reason: SDUPTHER

## 2023-11-29 RX ORDER — DROPERIDOL 2.5 MG/ML
0.62 INJECTION, SOLUTION INTRAMUSCULAR; INTRAVENOUS
Status: CANCELLED | OUTPATIENT
Start: 2023-11-29 | End: 2023-11-30

## 2023-11-29 RX ORDER — CEFAZOLIN SODIUM 1 G/3ML
INJECTION, POWDER, FOR SOLUTION INTRAMUSCULAR; INTRAVENOUS PRN
Status: DISCONTINUED | OUTPATIENT
Start: 2023-11-29 | End: 2023-11-29 | Stop reason: SDUPTHER

## 2023-11-29 RX ORDER — SODIUM CHLORIDE 0.9 % (FLUSH) 0.9 %
5-40 SYRINGE (ML) INJECTION PRN
Status: CANCELLED | OUTPATIENT
Start: 2023-11-29

## 2023-11-29 RX ORDER — LIDOCAINE HYDROCHLORIDE 10 MG/ML
INJECTION, SOLUTION EPIDURAL; INFILTRATION; INTRACAUDAL; PERINEURAL PRN
Status: DISCONTINUED | OUTPATIENT
Start: 2023-11-29 | End: 2023-11-29 | Stop reason: SDUPTHER

## 2023-11-29 RX ORDER — INDOMETHACIN 50 MG/1
SUPPOSITORY RECTAL PRN
Status: DISCONTINUED | OUTPATIENT
Start: 2023-11-29 | End: 2023-11-29 | Stop reason: HOSPADM

## 2023-11-29 RX ORDER — ONDANSETRON 2 MG/ML
INJECTION INTRAMUSCULAR; INTRAVENOUS PRN
Status: DISCONTINUED | OUTPATIENT
Start: 2023-11-29 | End: 2023-11-29 | Stop reason: SDUPTHER

## 2023-11-29 RX ORDER — LABETALOL HYDROCHLORIDE 5 MG/ML
10 INJECTION, SOLUTION INTRAVENOUS
Status: CANCELLED | OUTPATIENT
Start: 2023-11-29

## 2023-11-29 RX ORDER — SODIUM CHLORIDE 9 MG/ML
INJECTION, SOLUTION INTRAVENOUS PRN
Status: CANCELLED | OUTPATIENT
Start: 2023-11-29

## 2023-11-29 RX ORDER — FENTANYL CITRATE 50 UG/ML
25 INJECTION, SOLUTION INTRAMUSCULAR; INTRAVENOUS EVERY 5 MIN PRN
Status: CANCELLED | OUTPATIENT
Start: 2023-11-29

## 2023-11-29 RX ORDER — DEXAMETHASONE SODIUM PHOSPHATE 10 MG/ML
INJECTION INTRAMUSCULAR; INTRAVENOUS PRN
Status: DISCONTINUED | OUTPATIENT
Start: 2023-11-29 | End: 2023-11-29 | Stop reason: SDUPTHER

## 2023-11-29 RX ORDER — FENTANYL CITRATE 50 UG/ML
INJECTION, SOLUTION INTRAMUSCULAR; INTRAVENOUS PRN
Status: DISCONTINUED | OUTPATIENT
Start: 2023-11-29 | End: 2023-11-29 | Stop reason: SDUPTHER

## 2023-11-29 RX ORDER — PROCHLORPERAZINE EDISYLATE 5 MG/ML
5 INJECTION INTRAMUSCULAR; INTRAVENOUS
Status: CANCELLED | OUTPATIENT
Start: 2023-11-29 | End: 2023-11-30

## 2023-11-29 RX ADMIN — FENTANYL CITRATE 25 MCG: 50 INJECTION, SOLUTION INTRAMUSCULAR; INTRAVENOUS at 16:32

## 2023-11-29 RX ADMIN — ROCURONIUM BROMIDE 40 MG: 10 INJECTION, SOLUTION INTRAVENOUS at 15:15

## 2023-11-29 RX ADMIN — FENTANYL CITRATE 50 MCG: 50 INJECTION, SOLUTION INTRAMUSCULAR; INTRAVENOUS at 15:15

## 2023-11-29 RX ADMIN — URSODIOL 500 MG: 250 TABLET, FILM COATED ORAL at 08:10

## 2023-11-29 RX ADMIN — SODIUM CHLORIDE, PRESERVATIVE FREE 10 ML: 5 INJECTION INTRAVENOUS at 20:09

## 2023-11-29 RX ADMIN — Medication 3 MG: at 20:09

## 2023-11-29 RX ADMIN — FENTANYL CITRATE 25 MCG: 50 INJECTION, SOLUTION INTRAMUSCULAR; INTRAVENOUS at 15:40

## 2023-11-29 RX ADMIN — MIDAZOLAM 2 MG: 1 INJECTION INTRAMUSCULAR; INTRAVENOUS at 15:14

## 2023-11-29 RX ADMIN — DEXAMETHASONE SODIUM PHOSPHATE 4 MG: 10 INJECTION INTRAMUSCULAR; INTRAVENOUS at 15:15

## 2023-11-29 RX ADMIN — SUGAMMADEX 200 MG: 100 INJECTION, SOLUTION INTRAVENOUS at 16:25

## 2023-11-29 RX ADMIN — CEFAZOLIN 2 G: 1 INJECTION, POWDER, FOR SOLUTION INTRAMUSCULAR; INTRAVENOUS at 15:51

## 2023-11-29 RX ADMIN — URSODIOL 500 MG: 250 TABLET, FILM COATED ORAL at 20:09

## 2023-11-29 RX ADMIN — SODIUM CHLORIDE, PRESERVATIVE FREE 5 ML: 5 INJECTION INTRAVENOUS at 08:10

## 2023-11-29 RX ADMIN — ONDANSETRON 4 MG: 2 INJECTION INTRAMUSCULAR; INTRAVENOUS at 16:15

## 2023-11-29 RX ADMIN — LIDOCAINE HYDROCHLORIDE 50 MG: 10 INJECTION, SOLUTION EPIDURAL; INFILTRATION; INTRACAUDAL; PERINEURAL at 15:15

## 2023-11-29 NOTE — OP NOTE
Operative Note      Patient: Cas Gaytan  YOB: 1955  MRN: [id number]    Date of Procedure: 11/29/2023    Pre-Op Diagnosis Codes:     * Obstructive jaundice [K83.1]    Post-Op Diagnosis: Gastric ulcers, gastritis, choledocholithiasis, cholelithiasis and paulette hepatis lymphadenopathy. Procedure(s):  ERCP STONE REMOVAL  EGD W/EUS FNA  EGD BIOPSY    Surgeon(s):  Ana Rawls MD    Assistant:   First Assistant: Chepe Foster RN    Anesthesia: General    Estimated Blood Loss (mL): Minimal    Complications: None    Specimens:   ID Type Source Tests Collected by Time Destination   A : gastric bx Tissue Stomach SURGICAL PATHOLOGY Albert Lewis MD 11/29/2023 1525    B : duodenal bx Tissue Duodenum SURGICAL PATHOLOGY Albert Plata MD 11/29/2023 1525    C : portahepatis lymph node fnb Tissue Lymph Node CYTOLOGY, NON-GYN Ana Rawls MD 11/29/2023 1541        Implants:  * No implants in log *      Drains: * No LDAs found *    Description of Procedure:  Informed consent was obtained from the patient after explanation of the procedure including indications, description of the procedure,  benefits and possible risks and complications of the procedure, and alternatives. Questions were answered. The patient's history was reviewed and a directed physical examination was performed prior to the procedure. Patient was monitored throughout the procedure with pulse oximetry and periodic assessment of vital signs. Patient was sedated as noted above. With the patient in the left lateral decubitus position, the Olympus videoendoscope followed by endoechoendoscope was placed in the patient's mouth and under direct visualization passed into the esophagus. The scope was passed to the 2nd portion of the duodenum. The patient tolerated the procedure well and was taken to the recovery area in good condition.     EGD Findings[de-identified]   Esophagus: normal.   Stomach: 1 small cratered

## 2023-11-29 NOTE — PLAN OF CARE
Problem: Discharge Planning  Goal: Discharge to home or other facility with appropriate resources  11/29/2023 1013 by Leo Ortiz RN  Outcome: Progressing  11/29/2023 0213 by Shabana Figueroa RN  Outcome: Progressing  Flowsheets (Taken 11/28/2023 2100)  Discharge to home or other facility with appropriate resources:   Identify barriers to discharge with patient and caregiver   Arrange for needed discharge resources and transportation as appropriate   Identify discharge learning needs (meds, wound care, etc)   Refer to discharge planning if patient needs post-hospital services based on physician order or complex needs related to functional status, cognitive ability or social support system     Problem: Safety - Adult  Goal: Free from fall injury  11/29/2023 1013 by Leo Ortiz RN  Outcome: Progressing  11/29/2023 0213 by Shabana Figueroa RN  Outcome: Progressing  [address] (Taken 11/28/2023 2100)  Free From Fall Injury: Instruct family/caregiver on patient safety     Problem: ABCDS Injury Assessment  Goal: Absence of physical injury  11/29/2023 1013 by Leo Ortiz RN  Outcome: Progressing  11/29/2023 0213 by Shabana Figueroa RN  Outcome: Progressing  Flowsheets (Taken 11/28/2023 2100)  Absence of Physical Injury: Implement safety measures based on patient assessment

## 2023-11-29 NOTE — PLAN OF CARE
Problem: Discharge Planning  Goal: Discharge to home or other facility with appropriate resources  Outcome: Progressing  Flowsheets (Taken 11/28/2023 2100)  Discharge to home or other facility with appropriate resources:   Identify barriers to discharge with patient and caregiver   Arrange for needed discharge resources and transportation as appropriate   Identify discharge learning needs (meds, wound care, etc)   Refer to discharge planning if patient needs post-hospital services based on physician order or complex needs related to functional status, cognitive ability or social support system     Problem: Safety - Adult  Goal: Free from fall injury  Outcome: Progressing  Flowsheets (Taken 11/28/2023 2100)  Free From Fall Injury: Instruct family/caregiver on patient safety     Problem: ABCDS Injury Assessment  Goal: Absence of physical injury  Outcome: Progressing  Flowsheets (Taken 11/28/2023 2100)  Absence of Physical Injury: Implement safety measures based on patient assessment

## 2023-11-29 NOTE — PROGRESS NOTES
Comprehensive Nutrition Assessment    Type and Reason for Visit:  RD Nutrition Re-Screen/LOS    Nutrition Recommendations/Plan:   Advance diet as medically able, suggest Regular Diet  Will monitor for re-start of nutrition     Malnutrition Assessment:  Malnutrition Status:  No malnutrition (11/29/23 1024)    Context:  Acute Illness     Findings of the 6 clinical characteristics of malnutrition:  Energy Intake:  No significant decrease in energy intake  Weight Loss:  Unable to assess     Body Fat Loss:  No significant body fat loss     Muscle Mass Loss:  No significant muscle mass loss    Fluid Accumulation:  No significant fluid accumulation     Strength:  Not Performed    Nutrition Assessment:    Pt seen for LOS. 73 yo M adm sclerosing cholangitis. PMH significant for dementia. Pt NPO for EUS/ERCP today. Pt endorses no changes in wt pta, unable to state UBW, no wt hx per chart. Pt endorses good appetite, states he is eating a typical amount. Per chart, one meal noted with 51-75% PO intake. Discussed ONS with pt, declined all ONS. LBM 11/24. No edema noted. Nutrition Related Findings:    labs/meds reviewed Wound Type: None       Current Nutrition Intake & Therapies:    Average Meal Intake: 51-75%  Average Supplements Intake: None Ordered  Diet NPO Exceptions are: Sips of Water with Meds    Anthropometric Measures:  Height: 182.9 cm (6' 0.01\")  Ideal Body Weight (IBW): 178 lbs (81 kg)    Admission Body Weight: 81 kg (178 lb 9.2 oz) (11/22/23)  Current Body Weight: 77.6 kg (171 lb 1.2 oz) (11/29), 96.1 % IBW. Current BMI (kg/m2): 23.2        Weight Adjustment For: No Adjustment                 BMI Categories: Normal Weight (BMI 18.5-24. 9)    Estimated Daily Nutrient Needs:  Energy Requirements Based On: Kcal/kg  Weight Used for Energy Requirements: Current  Energy (kcal/day): 1900 to 2200 kcal/day  Weight Used for Protein Requirements: Current  Protein (g/day): 90 to 100 g/day  Method Used for Fluid

## 2023-11-29 NOTE — OP NOTE
Operative Note      Patient: Avni Luna  YOB: 1955  MRN: [id number]    Date of Procedure: 11/29/2023    Pre-Op Diagnosis Codes:     * Obstructive jaundice [K83.1]    Post-Op Diagnosis:  Sphincterotomy, balloon sweep, stone and sludge removal.       Procedure(s):  ERCP STONE REMOVAL  EGD W/EUS FNA  EGD BIOPSY  ERCP SPHINCTER/PAPILLOTOMY    Surgeon(s):  Sherley Ward MD    Assistant:   First Assistant: Larry Acevedo RN    Anesthesia: General    Estimated Blood Loss (mL): Minimal    Complications: None    Specimens:   ID Type Source Tests Collected by Time Destination   A : gastric bx Tissue Stomach SURGICAL PATHOLOGY Sherley Ward MD 11/29/2023 1525    B : duodenal bx Tissue Duodenum SURGICAL PATHOLOGY Nancy Welch, Howie Mcdonald MD 11/29/2023 1525    C : portahepatis lymph node fnb Tissue Lymph Node CYTOLOGY, NON-GYN Sherley Ward MD 11/29/2023 1541        Implants:  * No implants in log *      Drains: * No LDAs found *        Description of Procedure:  Prior to the procedure, a history and physical exam was performed and informed consent was obtained. The risks were discussed including pancreatitis, bleeding, and perforation. After the patient was placed in the prone position eved, the therapeutic duodenoscope scope was inserted into the mouth and advanced to the second portion of the duodenum allowing the papilla to be visualized. Using the a wire guided approach with the sphincterotome, the CBD was cannulated and a cholangiogram was performed. Findings: The initial cholangiogram revealed dilated CBD and CHD with a filling defect in the distal CBD. A 8 mm sphincterotomy was performed. The sphincterotome was exchanged, over a wire for a 9-12 mm above  injection balloon. Multiple balloon sweeps were performed revealing sludge and 1 small to medium size yellow stone.     A terminal balloon occlusion cholangiongram revealed dilated CBD and CHD with no

## 2023-11-29 NOTE — PROGRESS NOTES
[facility]  Office: [phone]  Wesley Hanks, DO, Guanaco Dys, DO, Evelina Phillips, DO, Angie Blanca Blood, DO, Sandy Couch MD, Dahlia Shoemaker MD, Ed Gallagher MD, Iwona Torres MD,  Hay Patino MD, Miles Perkins MD, Aman Coronado MD,  Amira Lae MD, Brandon Lopez MD, Kathe Bernabe, DO, Silas Jaeger MD,  Romulo White, DO, Jessenia Cardona MD, Brandon Daniels MD, Dianne Uribe MD, Cosmo Garcia MD,  Alana Long MD, Jazmine Martinez MD, Vinay Awan MD, Hailey Garcia MD, Chyna Barron MD, Shamir Muñoz DO, Alta Dimas, DO, Fritz Watts MD,  Neto Johnson MD, Ramesh Kraus, MARY,  Iza Schwartz CNP, Edvin Barnett CNP,  Lianet Tracey DNP, Mendy Osborne CNP, Italia Costello CNP, Bryson Jeffery CNP, Karon Lopez, CNP, Zeb Doyle, [facility], Akila Alan, [address] , Adams Cowart, [facility], Sher Mitchell, CNS, Kristian Clemons, CNP, Rosie Parker, [address]    Progress Note    11/29/2023    8:37 AM    Name:   Kimberly Ellis  MRN:     [id number]     Acct:      [de-identified]   Room:   [address] [place] [address] Day:  7  Admit Date:  11/22/2023 10:52 PM    PCP:   No primary care provider on file. Code Status:  Full Code    Subjective:     C/C: Jaundice     Interval History Status: improved. Patient seen and examined at bedside this morning. No acute events overnight. Resting comfortably in bed. GI planning for EUS/ERCP today. Patient is currently NPO. Patient denies any CP, SOB, abdominal pain, fever or chills.     Brief History:     Per chart  Kimberly Ellis is a 74 y.o.  male who presents with jaundice  The patient was transferred from [facility] for evaluation and treatment of jaundice  Patient has underlying cognitive impairment and is unable to provide any reliable historical data  At this time patient denies any abdominal pain  He has not observed any dark urine or

## 2023-11-30 ENCOUNTER — ANESTHESIA (OUTPATIENT)
Dept: OPERATING ROOM | Age: 68
End: 2023-11-30
Payer: MEDICARE

## 2023-11-30 ENCOUNTER — TELEPHONE (OUTPATIENT)
Age: 68
End: 2023-11-30

## 2023-11-30 ENCOUNTER — ANESTHESIA EVENT (OUTPATIENT)
Dept: OPERATING ROOM | Age: 68
End: 2023-11-30
Payer: MEDICARE

## 2023-11-30 ENCOUNTER — APPOINTMENT (OUTPATIENT)
Dept: GENERAL RADIOLOGY | Age: 68
DRG: 417 | End: 2023-11-30
Attending: INTERNAL MEDICINE
Payer: MEDICARE

## 2023-11-30 PROBLEM — K80.50 CHOLEDOCHOLITHIASIS: Status: ACTIVE | Noted: 2023-11-30

## 2023-11-30 PROBLEM — K80.21 CALCULUS OF GALLBLADDER WITH BILIARY OBSTRUCTION BUT WITHOUT CHOLECYSTITIS: Status: ACTIVE | Noted: 2023-11-30

## 2023-11-30 LAB
ALBUMIN SERPL-MCNC: 3.2 G/DL (ref 3.5–5.2)
ALBUMIN SERPL-MCNC: 3.2 G/DL (ref 3.5–5.2)
ALBUMIN/GLOB SERPL: 1.1 {RATIO} (ref 1–2.5)
ALBUMIN/GLOB SERPL: 1.1 {RATIO} (ref 1–2.5)
ALP SERPL-CCNC: 238 U/L (ref 40–129)
ALP SERPL-CCNC: 238 U/L (ref 40–129)
ALT SERPL-CCNC: 43 U/L (ref 5–41)
ALT SERPL-CCNC: 43 U/L (ref 5–41)
ANION GAP SERPL CALCULATED.3IONS-SCNC: 13 MMOL/L (ref 9–17)
ANION GAP SERPL CALCULATED.3IONS-SCNC: 13 MMOL/L (ref 9–17)
AST SERPL-CCNC: 26 U/L
AST SERPL-CCNC: 26 U/L
BASOPHILS # BLD: 0.03 K/UL (ref 0–0.2)
BASOPHILS # BLD: 0.03 K/UL (ref 0–0.2)
BASOPHILS NFR BLD: 0 % (ref 0–2)
BASOPHILS NFR BLD: 0 % (ref 0–2)
BILIRUB DIRECT SERPL-MCNC: 2.4 MG/DL
BILIRUB DIRECT SERPL-MCNC: 2.4 MG/DL
BILIRUB INDIRECT SERPL-MCNC: 1.1 MG/DL (ref 0–1)
BILIRUB INDIRECT SERPL-MCNC: 1.1 MG/DL (ref 0–1)
BILIRUB SERPL-MCNC: 3.5 MG/DL (ref 0.3–1.2)
BILIRUB SERPL-MCNC: 3.5 MG/DL (ref 0.3–1.2)
BUN SERPL-MCNC: 17 MG/DL (ref 8–23)
BUN SERPL-MCNC: 17 MG/DL (ref 8–23)
CALCIUM SERPL-MCNC: 9.1 MG/DL (ref 8.6–10.4)
CALCIUM SERPL-MCNC: 9.1 MG/DL (ref 8.6–10.4)
CASE NUMBER:: NORMAL
CASE NUMBER:: NORMAL
CHLORIDE SERPL-SCNC: 103 MMOL/L (ref 98–107)
CHLORIDE SERPL-SCNC: 103 MMOL/L (ref 98–107)
CO2 SERPL-SCNC: 22 MMOL/L (ref 20–31)
CO2 SERPL-SCNC: 22 MMOL/L (ref 20–31)
CREAT SERPL-MCNC: 0.7 MG/DL (ref 0.7–1.2)
CREAT SERPL-MCNC: 0.7 MG/DL (ref 0.7–1.2)
EOSINOPHIL # BLD: 0.05 K/UL (ref 0–0.44)
EOSINOPHIL # BLD: 0.05 K/UL (ref 0–0.44)
EOSINOPHILS RELATIVE PERCENT: 1 % (ref 1–4)
EOSINOPHILS RELATIVE PERCENT: 1 % (ref 1–4)
ERYTHROCYTE [DISTWIDTH] IN BLOOD BY AUTOMATED COUNT: 14.8 % (ref 11.8–14.4)
ERYTHROCYTE [DISTWIDTH] IN BLOOD BY AUTOMATED COUNT: 14.8 % (ref 11.8–14.4)
GFR SERPL CREATININE-BSD FRML MDRD: >60 ML/MIN/1.73M2
GFR SERPL CREATININE-BSD FRML MDRD: >60 ML/MIN/1.73M2
GLUCOSE SERPL-MCNC: 94 MG/DL (ref 70–99)
GLUCOSE SERPL-MCNC: 94 MG/DL (ref 70–99)
HCT VFR BLD AUTO: 42.1 % (ref 40.7–50.3)
HCT VFR BLD AUTO: 42.1 % (ref 40.7–50.3)
HGB BLD-MCNC: 14.1 G/DL (ref 13–17)
HGB BLD-MCNC: 14.1 G/DL (ref 13–17)
IMM GRANULOCYTES # BLD AUTO: 0.06 K/UL (ref 0–0.3)
IMM GRANULOCYTES # BLD AUTO: 0.06 K/UL (ref 0–0.3)
IMM GRANULOCYTES NFR BLD: 1 %
IMM GRANULOCYTES NFR BLD: 1 %
LYMPHOCYTES NFR BLD: 1.69 K/UL (ref 1.1–3.7)
LYMPHOCYTES NFR BLD: 1.69 K/UL (ref 1.1–3.7)
LYMPHOCYTES RELATIVE PERCENT: 18 % (ref 24–43)
LYMPHOCYTES RELATIVE PERCENT: 18 % (ref 24–43)
MCH RBC QN AUTO: 30.4 PG (ref 25.2–33.5)
MCH RBC QN AUTO: 30.4 PG (ref 25.2–33.5)
MCHC RBC AUTO-ENTMCNC: 33.5 G/DL (ref 28.4–34.8)
MCHC RBC AUTO-ENTMCNC: 33.5 G/DL (ref 28.4–34.8)
MCV RBC AUTO: 90.7 FL (ref 82.6–102.9)
MCV RBC AUTO: 90.7 FL (ref 82.6–102.9)
MONOCYTES NFR BLD: 0.84 K/UL (ref 0.1–1.2)
MONOCYTES NFR BLD: 0.84 K/UL (ref 0.1–1.2)
MONOCYTES NFR BLD: 9 % (ref 3–12)
MONOCYTES NFR BLD: 9 % (ref 3–12)
NEUTROPHILS NFR BLD: 71 % (ref 36–65)
NEUTROPHILS NFR BLD: 71 % (ref 36–65)
NEUTS SEG NFR BLD: 6.84 K/UL (ref 1.5–8.1)
NEUTS SEG NFR BLD: 6.84 K/UL (ref 1.5–8.1)
NRBC BLD-RTO: 0 PER 100 WBC
NRBC BLD-RTO: 0 PER 100 WBC
PLATELET # BLD AUTO: 283 K/UL (ref 138–453)
PLATELET # BLD AUTO: 283 K/UL (ref 138–453)
PMV BLD AUTO: 11.3 FL (ref 8.1–13.5)
PMV BLD AUTO: 11.3 FL (ref 8.1–13.5)
POTASSIUM SERPL-SCNC: 4.2 MMOL/L (ref 3.7–5.3)
POTASSIUM SERPL-SCNC: 4.2 MMOL/L (ref 3.7–5.3)
PROT SERPL-MCNC: 6.2 G/DL (ref 6.4–8.3)
PROT SERPL-MCNC: 6.2 G/DL (ref 6.4–8.3)
RBC # BLD AUTO: 4.64 M/UL (ref 4.21–5.77)
RBC # BLD AUTO: 4.64 M/UL (ref 4.21–5.77)
RBC # BLD: ABNORMAL 10*6/UL
RBC # BLD: ABNORMAL 10*6/UL
SODIUM SERPL-SCNC: 138 MMOL/L (ref 135–144)
SODIUM SERPL-SCNC: 138 MMOL/L (ref 135–144)
WBC OTHER # BLD: 9.5 K/UL (ref 3.5–11.3)
WBC OTHER # BLD: 9.5 K/UL (ref 3.5–11.3)

## 2023-11-30 PROCEDURE — 36415 COLL VENOUS BLD VENIPUNCTURE: CPT

## 2023-11-30 PROCEDURE — 1200000000 HC SEMI PRIVATE

## 2023-11-30 PROCEDURE — 2580000003 HC RX 258: Performed by: INTERNAL MEDICINE

## 2023-11-30 PROCEDURE — 80076 HEPATIC FUNCTION PANEL: CPT

## 2023-11-30 PROCEDURE — 85025 COMPLETE CBC W/AUTO DIFF WBC: CPT

## 2023-11-30 PROCEDURE — 6360000002 HC RX W HCPCS: Performed by: STUDENT IN AN ORGANIZED HEALTH CARE EDUCATION/TRAINING PROGRAM

## 2023-11-30 PROCEDURE — 80048 BASIC METABOLIC PNL TOTAL CA: CPT

## 2023-11-30 PROCEDURE — 6360000002 HC RX W HCPCS: Performed by: INTERNAL MEDICINE

## 2023-11-30 PROCEDURE — 2500000003 HC RX 250 WO HCPCS: Performed by: SURGERY

## 2023-11-30 PROCEDURE — 3600000014 HC SURGERY LEVEL 4 ADDTL 15MIN: Performed by: SURGERY

## 2023-11-30 PROCEDURE — 3600000004 HC SURGERY LEVEL 4 BASE: Performed by: SURGERY

## 2023-11-30 PROCEDURE — 7100000001 HC PACU RECOVERY - ADDTL 15 MIN: Performed by: SURGERY

## 2023-11-30 PROCEDURE — 70250 X-RAY EXAM OF SKULL: CPT

## 2023-11-30 PROCEDURE — 7100000000 HC PACU RECOVERY - FIRST 15 MIN: Performed by: SURGERY

## 2023-11-30 PROCEDURE — 6360000002 HC RX W HCPCS: Performed by: NURSE ANESTHETIST, CERTIFIED REGISTERED

## 2023-11-30 PROCEDURE — 2580000003 HC RX 258: Performed by: STUDENT IN AN ORGANIZED HEALTH CARE EDUCATION/TRAINING PROGRAM

## 2023-11-30 PROCEDURE — 88304 TISSUE EXAM BY PATHOLOGIST: CPT

## 2023-11-30 PROCEDURE — 2580000003 HC RX 258: Performed by: SURGERY

## 2023-11-30 PROCEDURE — 47562 LAPAROSCOPIC CHOLECYSTECTOMY: CPT | Performed by: SURGERY

## 2023-11-30 PROCEDURE — 2580000003 HC RX 258: Performed by: NURSE ANESTHETIST, CERTIFIED REGISTERED

## 2023-11-30 PROCEDURE — 99232 SBSQ HOSP IP/OBS MODERATE 35: CPT | Performed by: INTERNAL MEDICINE

## 2023-11-30 PROCEDURE — 99223 1ST HOSP IP/OBS HIGH 75: CPT | Performed by: SURGERY

## 2023-11-30 PROCEDURE — 3700000001 HC ADD 15 MINUTES (ANESTHESIA): Performed by: SURGERY

## 2023-11-30 PROCEDURE — 6370000000 HC RX 637 (ALT 250 FOR IP): Performed by: INTERNAL MEDICINE

## 2023-11-30 PROCEDURE — 2500000003 HC RX 250 WO HCPCS: Performed by: NURSE ANESTHETIST, CERTIFIED REGISTERED

## 2023-11-30 PROCEDURE — 2709999900 HC NON-CHARGEABLE SUPPLY: Performed by: SURGERY

## 2023-11-30 PROCEDURE — 0FT44ZZ RESECTION OF GALLBLADDER, PERCUTANEOUS ENDOSCOPIC APPROACH: ICD-10-PCS | Performed by: SURGERY

## 2023-11-30 PROCEDURE — 3700000000 HC ANESTHESIA ATTENDED CARE: Performed by: SURGERY

## 2023-11-30 PROCEDURE — 99232 SBSQ HOSP IP/OBS MODERATE 35: CPT | Performed by: STUDENT IN AN ORGANIZED HEALTH CARE EDUCATION/TRAINING PROGRAM

## 2023-11-30 RX ORDER — SODIUM CHLORIDE, SODIUM LACTATE, POTASSIUM CHLORIDE, CALCIUM CHLORIDE 600; 310; 30; 20 MG/100ML; MG/100ML; MG/100ML; MG/100ML
INJECTION, SOLUTION INTRAVENOUS CONTINUOUS
Status: DISCONTINUED | OUTPATIENT
Start: 2023-11-30 | End: 2023-12-01 | Stop reason: HOSPADM

## 2023-11-30 RX ORDER — HYDRALAZINE HYDROCHLORIDE 20 MG/ML
10 INJECTION INTRAMUSCULAR; INTRAVENOUS
Status: DISCONTINUED | OUTPATIENT
Start: 2023-11-30 | End: 2023-12-01 | Stop reason: HOSPADM

## 2023-11-30 RX ORDER — SODIUM CHLORIDE 0.9 % (FLUSH) 0.9 %
5-40 SYRINGE (ML) INJECTION PRN
Status: DISCONTINUED | OUTPATIENT
Start: 2023-11-30 | End: 2023-12-01 | Stop reason: HOSPADM

## 2023-11-30 RX ORDER — PANTOPRAZOLE SODIUM 40 MG/1
40 TABLET, DELAYED RELEASE ORAL
Status: DISCONTINUED | OUTPATIENT
Start: 2023-11-30 | End: 2023-12-01 | Stop reason: HOSPADM

## 2023-11-30 RX ORDER — SODIUM CHLORIDE 0.9 % (FLUSH) 0.9 %
5-40 SYRINGE (ML) INJECTION EVERY 12 HOURS SCHEDULED
Status: DISCONTINUED | OUTPATIENT
Start: 2023-12-01 | End: 2023-12-01 | Stop reason: HOSPADM

## 2023-11-30 RX ORDER — SODIUM CHLORIDE 9 MG/ML
INJECTION, SOLUTION INTRAVENOUS PRN
Status: DISCONTINUED | OUTPATIENT
Start: 2023-11-30 | End: 2023-12-01 | Stop reason: HOSPADM

## 2023-11-30 RX ORDER — BUPIVACAINE HYDROCHLORIDE AND EPINEPHRINE 5; 5 MG/ML; UG/ML
INJECTION, SOLUTION EPIDURAL; INTRACAUDAL; PERINEURAL PRN
Status: DISCONTINUED | OUTPATIENT
Start: 2023-11-30 | End: 2023-11-30 | Stop reason: ALTCHOICE

## 2023-11-30 RX ORDER — LIDOCAINE HYDROCHLORIDE 10 MG/ML
INJECTION, SOLUTION EPIDURAL; INFILTRATION; INTRACAUDAL; PERINEURAL PRN
Status: DISCONTINUED | OUTPATIENT
Start: 2023-11-30 | End: 2023-11-30 | Stop reason: SDUPTHER

## 2023-11-30 RX ORDER — SODIUM CHLORIDE, SODIUM LACTATE, POTASSIUM CHLORIDE, CALCIUM CHLORIDE 600; 310; 30; 20 MG/100ML; MG/100ML; MG/100ML; MG/100ML
INJECTION, SOLUTION INTRAVENOUS CONTINUOUS PRN
Status: DISCONTINUED | OUTPATIENT
Start: 2023-11-30 | End: 2023-11-30 | Stop reason: SDUPTHER

## 2023-11-30 RX ORDER — ONDANSETRON 2 MG/ML
INJECTION INTRAMUSCULAR; INTRAVENOUS PRN
Status: DISCONTINUED | OUTPATIENT
Start: 2023-11-30 | End: 2023-11-30 | Stop reason: SDUPTHER

## 2023-11-30 RX ORDER — ROCURONIUM BROMIDE 10 MG/ML
INJECTION, SOLUTION INTRAVENOUS PRN
Status: DISCONTINUED | OUTPATIENT
Start: 2023-11-30 | End: 2023-11-30 | Stop reason: SDUPTHER

## 2023-11-30 RX ORDER — MAGNESIUM HYDROXIDE 1200 MG/15ML
LIQUID ORAL CONTINUOUS PRN
Status: COMPLETED | OUTPATIENT
Start: 2023-11-30 | End: 2023-11-30

## 2023-11-30 RX ORDER — PROPOFOL 10 MG/ML
INJECTION, EMULSION INTRAVENOUS PRN
Status: DISCONTINUED | OUTPATIENT
Start: 2023-11-30 | End: 2023-11-30 | Stop reason: SDUPTHER

## 2023-11-30 RX ORDER — DEXAMETHASONE SODIUM PHOSPHATE 10 MG/ML
INJECTION INTRAMUSCULAR; INTRAVENOUS PRN
Status: DISCONTINUED | OUTPATIENT
Start: 2023-11-30 | End: 2023-11-30 | Stop reason: SDUPTHER

## 2023-11-30 RX ORDER — ONDANSETRON 2 MG/ML
4 INJECTION INTRAMUSCULAR; INTRAVENOUS
Status: DISCONTINUED | OUTPATIENT
Start: 2023-11-30 | End: 2023-12-01 | Stop reason: HOSPADM

## 2023-11-30 RX ORDER — FENTANYL CITRATE 50 UG/ML
INJECTION, SOLUTION INTRAMUSCULAR; INTRAVENOUS PRN
Status: DISCONTINUED | OUTPATIENT
Start: 2023-11-30 | End: 2023-11-30 | Stop reason: SDUPTHER

## 2023-11-30 RX ORDER — GLYCOPYRROLATE 0.2 MG/ML
INJECTION INTRAMUSCULAR; INTRAVENOUS PRN
Status: DISCONTINUED | OUTPATIENT
Start: 2023-11-30 | End: 2023-11-30 | Stop reason: SDUPTHER

## 2023-11-30 RX ORDER — EPHEDRINE SULFATE/0.9% NACL/PF 50 MG/5 ML
SYRINGE (ML) INTRAVENOUS PRN
Status: DISCONTINUED | OUTPATIENT
Start: 2023-11-30 | End: 2023-11-30 | Stop reason: SDUPTHER

## 2023-11-30 RX ADMIN — PROPOFOL 150 MG: 10 INJECTION, EMULSION INTRAVENOUS at 21:49

## 2023-11-30 RX ADMIN — Medication 5 MG: at 21:56

## 2023-11-30 RX ADMIN — HYDROMORPHONE HYDROCHLORIDE 0.25 MG: 1 INJECTION, SOLUTION INTRAMUSCULAR; INTRAVENOUS; SUBCUTANEOUS at 23:52

## 2023-11-30 RX ADMIN — ONDANSETRON 4 MG: 2 INJECTION INTRAMUSCULAR; INTRAVENOUS at 23:26

## 2023-11-30 RX ADMIN — FENTANYL CITRATE 100 MCG: 50 INJECTION, SOLUTION INTRAMUSCULAR; INTRAVENOUS at 21:49

## 2023-11-30 RX ADMIN — LIDOCAINE HYDROCHLORIDE 50 MG: 10 INJECTION, SOLUTION EPIDURAL; INFILTRATION; INTRACAUDAL; PERINEURAL at 21:49

## 2023-11-30 RX ADMIN — FENTANYL CITRATE 50 MCG: 50 INJECTION, SOLUTION INTRAMUSCULAR; INTRAVENOUS at 22:25

## 2023-11-30 RX ADMIN — SODIUM CHLORIDE, PRESERVATIVE FREE 10 ML: 5 INJECTION INTRAVENOUS at 08:51

## 2023-11-30 RX ADMIN — URSODIOL 500 MG: 250 TABLET, FILM COATED ORAL at 08:50

## 2023-11-30 RX ADMIN — ROCURONIUM BROMIDE 50 MG: 10 INJECTION, SOLUTION INTRAVENOUS at 21:49

## 2023-11-30 RX ADMIN — GLYCOPYRROLATE 0.2 MG: 0.2 INJECTION INTRAMUSCULAR; INTRAVENOUS at 21:49

## 2023-11-30 RX ADMIN — ROCURONIUM BROMIDE 20 MG: 10 INJECTION, SOLUTION INTRAVENOUS at 22:02

## 2023-11-30 RX ADMIN — ENOXAPARIN SODIUM 40 MG: 100 INJECTION SUBCUTANEOUS at 08:51

## 2023-11-30 RX ADMIN — Medication 2 G: at 22:07

## 2023-11-30 RX ADMIN — DEXAMETHASONE SODIUM PHOSPHATE 4 MG: 10 INJECTION INTRAMUSCULAR; INTRAVENOUS at 21:49

## 2023-11-30 RX ADMIN — SODIUM CHLORIDE, POTASSIUM CHLORIDE, SODIUM LACTATE AND CALCIUM CHLORIDE: 600; 310; 30; 20 INJECTION, SOLUTION INTRAVENOUS at 21:43

## 2023-11-30 RX ADMIN — SODIUM CHLORIDE, POTASSIUM CHLORIDE, SODIUM LACTATE AND CALCIUM CHLORIDE: 600; 310; 30; 20 INJECTION, SOLUTION INTRAVENOUS at 13:17

## 2023-11-30 RX ADMIN — HYDROMORPHONE HYDROCHLORIDE 0.25 MG: 1 INJECTION, SOLUTION INTRAMUSCULAR; INTRAVENOUS; SUBCUTANEOUS at 23:48

## 2023-11-30 RX ADMIN — FENTANYL CITRATE 50 MCG: 50 INJECTION, SOLUTION INTRAMUSCULAR; INTRAVENOUS at 22:31

## 2023-11-30 RX ADMIN — SUGAMMADEX 160 MG: 100 INJECTION, SOLUTION INTRAVENOUS at 23:33

## 2023-11-30 ASSESSMENT — PAIN DESCRIPTION - LOCATION
LOCATION: ABDOMEN
LOCATION: ABDOMEN

## 2023-11-30 ASSESSMENT — PAIN DESCRIPTION - ORIENTATION
ORIENTATION: MID
ORIENTATION: MID

## 2023-11-30 ASSESSMENT — PAIN DESCRIPTION - DESCRIPTORS
DESCRIPTORS: SORE
DESCRIPTORS: SORE

## 2023-11-30 ASSESSMENT — PAIN SCALES - GENERAL
PAINLEVEL_OUTOF10: 2
PAINLEVEL_OUTOF10: 4
PAINLEVEL_OUTOF10: 4

## 2023-11-30 NOTE — ANESTHESIA POSTPROCEDURE EVALUATION
Department of Anesthesiology  Postprocedure Note    Patient: Lieutenant Diaz  MRN: [id number]  YOB: 1955  Date of evaluation: 11/30/2023      Procedure Summary     Date: 11/29/23 Room / Location: [address]    Anesthesia Start: 3310 Anesthesia Stop: 1211    Procedures:       ERCP STONE REMOVAL      EGD W/EUS FNA      EGD BIOPSY      ERCP SPHINCTER/PAPILLOTOMY Diagnosis:       Obstructive jaundice      (Obstructive jaundice [K83.1])    Surgeons: Wendy Beard MD Responsible Provider: Naila Mckenna MD    Anesthesia Type: general ASA Status: 4          Anesthesia Type: No value filed.     Shu Phase I: Shu Score: 10    Shu Phase II:        Anesthesia Post Evaluation    Patient location during evaluation: PACU  Patient participation: complete - patient participated  Level of consciousness: awake and alert  Airway patency: patent  Nausea & Vomiting: no nausea and no vomiting  Complications: no  Cardiovascular status: blood pressure returned to baseline  Respiratory status: acceptable  Hydration status: stable  Pain management: adequate

## 2023-11-30 NOTE — TELEPHONE ENCOUNTER
----- Message from LUCIO Velez - CNP sent at 11/30/2023  1:31 PM EST -----  Regarding: EGD  Patient will need EGD in 2 months to assess for healing of gastric ulcers. FYI -patient has dementia. Will need to contact patient's guardian to arrange for procedure.      Thank you,   Scarlet Vargas

## 2023-11-30 NOTE — PLAN OF CARE
Problem: Discharge Planning  Goal: Discharge to home or other facility with appropriate resources  11/30/2023 0006 by Rodrick Stock RN  Outcome: Progressing     Problem: Safety - Adult  Goal: Free from fall injury  11/30/2023 0006 by Rodrick Stock RN  Outcome: Progressing     Problem: ABCDS Injury Assessment  Goal: Absence of physical injury  11/30/2023 0006 by Rodrick Stock RN  Outcome: Progressing

## 2023-11-30 NOTE — PROGRESS NOTES
[facility]  Office: [phone]  Shirley Mar, DO, Martín Cruz, DO, Mei Torres, DO, Rigoberto Kelly, DO, Honey Boast, MD, Olive Holley MD, Daisy Basurto MD, Heidi Alexander MD,  Deya Muñiz MD, Renato Gonzalez MD, Estuardo Frances MD,  Karen Benitez MD, Ana Ellis MD, Pattie Saxena DO, Bismark Figueroa MD,  García Rocha DO, Kaz Saenz MD, Juan Jiménez MD, Srikanth Beckwith MD, Deepika Del Angel MD,  Louie Camacho MD, Ion Russell MD, Kasia Downs MD, Venice Duncan MD, Betsy Espinal MD, Timoteo Addison DO, Jitendra Marshall, DO, Anish Morrison MD,  Britney Bhakta MD, Yola Joe, CNP,  Serafin Martin, CNP, Jamia Leal, CNP,  Hipolito Waddell, [facility] [place] Jeremy Dinero, CNP, Garrett Marcus CNP, Jacquelyn Closs, CNP, Mei Grant, CNP, Marysol La, CNP, Tiana Coates, [address], Josefina Nolasco, CNP, Ivet Alex, CNS, Tangela Posada, CNP, Bharath Norris, [address]    Progress Note    11/30/2023    8:25 AM    Name:   Shan Fields  MRN:     [id number]     Acct:      [de-identified]   Room:   [address] [place] [address] Day:  8  Admit Date:  11/22/2023 10:52 PM    PCP:   No primary care provider on file. Code Status:  Full Code    Subjective:     C/C: Jaundice     Interval History Status: improved. Patient seen and examined at bedside this morning. No acute events overnight. Patient had EUS/ERCP yesterday with GI and tolerated it well. Ulcer was seen in the stomach and sludge/1 stone was removed. Patient denies any CP, abdominal pain, SOB, HA, nausea/vomiting, fever or chills.     Brief History:     Shan Fields is a 74 y.o.  male who presents with jaundice  The patient was transferred from [facility] for evaluation and treatment of jaundice  Patient has underlying cognitive impairment and is unable to provide any reliable historical data  At this time patient denies any abdominal catheter terminating in the left lower quadrant. XR CHEST (2 VW)    Result Date: 11/27/2023  Unremarkable  shunt catheter coursing along the anterior right medial chest.     XR SKULL (<4 VIEWS)    Result Date: 11/26/2023  Shunt tube from a right parietal approach that is intact. Correlate with expected documented information in regards to make/model of shunt tube to ensure compatibility. Physical Examination:        Physical Exam  Vitals and nursing note reviewed. Constitutional:       General: He is not in acute distress. HENT:      Head: Normocephalic and atraumatic. Eyes:      Conjunctiva/sclera: Conjunctivae normal.      Pupils: Pupils are equal, round, and reactive to light. Comments: Unable to open his R eye   Cardiovascular:      Rate and Rhythm: Normal rate and regular rhythm. Heart sounds: No murmur heard. Pulmonary:      Effort: Pulmonary effort is normal. No accessory muscle usage or respiratory distress. Breath sounds: No stridor. No decreased breath sounds, wheezing, rhonchi or rales. Abdominal:      General: Bowel sounds are normal. There is no distension. Palpations: Abdomen is soft. Abdomen is not rigid. Tenderness: There is no abdominal tenderness. There is no guarding. Musculoskeletal:         General: No tenderness. Skin:     General: Skin is warm and dry. Coloration: Skin is jaundiced but improved      Findings: No erythema, lesion or rash. Neurological:      Mental Status: He is alert. Mental status is at baseline. Cranial Nerves: No cranial nerve deficit. Motor: No seizure activity. Psychiatric:         Speech: Speech normal.         Behavior: Behavior normal. Behavior is cooperative.      Assessment:        Hospital Problems             Last Modified POA    * (Principal) Sclerosing cholangitis 11/22/2023 Yes    Hypokalemia 11/23/2023 Yes    Obstructive jaundice 11/23/2023 Yes    Hypocalcemia 11/23/2023 Yes    Cognitive

## 2023-11-30 NOTE — ANESTHESIA PRE PROCEDURE
good (>4 METS),                     Neuro/Psych:   (+) psychiatric history: stable with treatment             ROS comment: Dementia ([address])    H/O traumatic brain injury    S/P  shunt GI/Hepatic/Renal:             Endo/Other:    (+) blood dyscrasia: anticoagulation therapy:., .                 Abdominal: normal exam            Vascular: Other Findings:             Anesthesia Plan      general     ASA 4       Induction: intravenous. MIPS: Postoperative opioids intended and Postoperative trial extubation. Anesthetic plan and risks discussed with legal guardian. Use of blood products discussed with legal guardian whom consented to blood products. Plan discussed with CRNA.                     Harpal Weinberg MD   11/30/2023

## 2023-11-30 NOTE — PLAN OF CARE
GI note    General Surgery consulted for cholecystectomy evaluation    Discussed with attending on second rounds - GI will sign off. Donnie Garcia, APRN - CNP

## 2023-12-01 ENCOUNTER — APPOINTMENT (OUTPATIENT)
Dept: GENERAL RADIOLOGY | Age: 68
DRG: 417 | End: 2023-12-01
Attending: INTERNAL MEDICINE
Payer: MEDICARE

## 2023-12-01 VITALS
DIASTOLIC BLOOD PRESSURE: 76 MMHG | HEIGHT: 72 IN | BODY MASS INDEX: 23.11 KG/M2 | RESPIRATION RATE: 18 BRPM | HEART RATE: 50 BPM | WEIGHT: 170.6 LBS | SYSTOLIC BLOOD PRESSURE: 120 MMHG | RESPIRATION RATE: 18 BRPM | TEMPERATURE: 98 F | HEIGHT: 72 IN | BODY MASS INDEX: 23.11 KG/M2 | HEART RATE: 50 BPM | OXYGEN SATURATION: 95 % | SYSTOLIC BLOOD PRESSURE: 120 MMHG | WEIGHT: 170.6 LBS | TEMPERATURE: 98 F | OXYGEN SATURATION: 95 % | DIASTOLIC BLOOD PRESSURE: 76 MMHG

## 2023-12-01 PROBLEM — K80.51 CALCULUS OF BILE DUCT WITHOUT CHOLECYSTITIS WITH OBSTRUCTION: Status: ACTIVE | Noted: 2023-12-01

## 2023-12-01 LAB
ALBUMIN SERPL-MCNC: 3.3 G/DL (ref 3.5–5.2)
ALBUMIN SERPL-MCNC: 3.3 G/DL (ref 3.5–5.2)
ALBUMIN/GLOB SERPL: 1.1 {RATIO} (ref 1–2.5)
ALBUMIN/GLOB SERPL: 1.1 {RATIO} (ref 1–2.5)
ALP SERPL-CCNC: 208 U/L (ref 40–129)
ALP SERPL-CCNC: 208 U/L (ref 40–129)
ALT SERPL-CCNC: 40 U/L (ref 5–41)
ALT SERPL-CCNC: 40 U/L (ref 5–41)
ANION GAP SERPL CALCULATED.3IONS-SCNC: 8 MMOL/L (ref 9–17)
ANION GAP SERPL CALCULATED.3IONS-SCNC: 8 MMOL/L (ref 9–17)
AST SERPL-CCNC: 30 U/L
AST SERPL-CCNC: 30 U/L
BASOPHILS # BLD: 0.05 K/UL (ref 0–0.2)
BASOPHILS # BLD: 0.05 K/UL (ref 0–0.2)
BASOPHILS NFR BLD: 0 % (ref 0–2)
BASOPHILS NFR BLD: 0 % (ref 0–2)
BILIRUB DIRECT SERPL-MCNC: 2.1 MG/DL
BILIRUB DIRECT SERPL-MCNC: 2.1 MG/DL
BILIRUB INDIRECT SERPL-MCNC: 1.3 MG/DL (ref 0–1)
BILIRUB INDIRECT SERPL-MCNC: 1.3 MG/DL (ref 0–1)
BILIRUB SERPL-MCNC: 3.4 MG/DL (ref 0.3–1.2)
BILIRUB SERPL-MCNC: 3.4 MG/DL (ref 0.3–1.2)
BUN SERPL-MCNC: 14 MG/DL (ref 8–23)
BUN SERPL-MCNC: 14 MG/DL (ref 8–23)
CALCIUM SERPL-MCNC: 9 MG/DL (ref 8.6–10.4)
CALCIUM SERPL-MCNC: 9 MG/DL (ref 8.6–10.4)
CHLORIDE SERPL-SCNC: 103 MMOL/L (ref 98–107)
CHLORIDE SERPL-SCNC: 103 MMOL/L (ref 98–107)
CO2 SERPL-SCNC: 24 MMOL/L (ref 20–31)
CO2 SERPL-SCNC: 24 MMOL/L (ref 20–31)
CREAT SERPL-MCNC: 0.7 MG/DL (ref 0.7–1.2)
CREAT SERPL-MCNC: 0.7 MG/DL (ref 0.7–1.2)
EOSINOPHIL # BLD: <0.03 K/UL (ref 0–0.44)
EOSINOPHIL # BLD: <0.03 K/UL (ref 0–0.44)
EOSINOPHILS RELATIVE PERCENT: 0 % (ref 1–4)
EOSINOPHILS RELATIVE PERCENT: 0 % (ref 1–4)
ERYTHROCYTE [DISTWIDTH] IN BLOOD BY AUTOMATED COUNT: 15.1 % (ref 11.8–14.4)
ERYTHROCYTE [DISTWIDTH] IN BLOOD BY AUTOMATED COUNT: 15.1 % (ref 11.8–14.4)
GFR SERPL CREATININE-BSD FRML MDRD: >60 ML/MIN/1.73M2
GFR SERPL CREATININE-BSD FRML MDRD: >60 ML/MIN/1.73M2
GLUCOSE SERPL-MCNC: 111 MG/DL (ref 70–99)
GLUCOSE SERPL-MCNC: 111 MG/DL (ref 70–99)
HCT VFR BLD AUTO: 48.7 % (ref 40.7–50.3)
HCT VFR BLD AUTO: 48.7 % (ref 40.7–50.3)
HGB BLD-MCNC: 15.6 G/DL (ref 13–17)
HGB BLD-MCNC: 15.6 G/DL (ref 13–17)
IMM GRANULOCYTES # BLD AUTO: 0.1 K/UL (ref 0–0.3)
IMM GRANULOCYTES # BLD AUTO: 0.1 K/UL (ref 0–0.3)
IMM GRANULOCYTES NFR BLD: 1 %
IMM GRANULOCYTES NFR BLD: 1 %
LYMPHOCYTES NFR BLD: 1.27 K/UL (ref 1.1–3.7)
LYMPHOCYTES NFR BLD: 1.27 K/UL (ref 1.1–3.7)
LYMPHOCYTES RELATIVE PERCENT: 11 % (ref 24–43)
LYMPHOCYTES RELATIVE PERCENT: 11 % (ref 24–43)
MCH RBC QN AUTO: 30.3 PG (ref 25.2–33.5)
MCH RBC QN AUTO: 30.3 PG (ref 25.2–33.5)
MCHC RBC AUTO-ENTMCNC: 32 G/DL (ref 28.4–34.8)
MCHC RBC AUTO-ENTMCNC: 32 G/DL (ref 28.4–34.8)
MCV RBC AUTO: 94.6 FL (ref 82.6–102.9)
MCV RBC AUTO: 94.6 FL (ref 82.6–102.9)
MONOCYTES NFR BLD: 0.55 K/UL (ref 0.1–1.2)
MONOCYTES NFR BLD: 0.55 K/UL (ref 0.1–1.2)
MONOCYTES NFR BLD: 5 % (ref 3–12)
MONOCYTES NFR BLD: 5 % (ref 3–12)
NEUTROPHILS NFR BLD: 83 % (ref 36–65)
NEUTROPHILS NFR BLD: 83 % (ref 36–65)
NEUTS SEG NFR BLD: 9.29 K/UL (ref 1.5–8.1)
NEUTS SEG NFR BLD: 9.29 K/UL (ref 1.5–8.1)
NRBC BLD-RTO: 0 PER 100 WBC
NRBC BLD-RTO: 0 PER 100 WBC
PLATELET # BLD AUTO: 352 K/UL (ref 138–453)
PLATELET # BLD AUTO: 352 K/UL (ref 138–453)
PMV BLD AUTO: 10.9 FL (ref 8.1–13.5)
PMV BLD AUTO: 10.9 FL (ref 8.1–13.5)
POTASSIUM SERPL-SCNC: 4.5 MMOL/L (ref 3.7–5.3)
POTASSIUM SERPL-SCNC: 4.5 MMOL/L (ref 3.7–5.3)
PROT SERPL-MCNC: 6.3 G/DL (ref 6.4–8.3)
PROT SERPL-MCNC: 6.3 G/DL (ref 6.4–8.3)
RBC # BLD AUTO: 5.15 M/UL (ref 4.21–5.77)
RBC # BLD AUTO: 5.15 M/UL (ref 4.21–5.77)
RBC # BLD: ABNORMAL 10*6/UL
RBC # BLD: ABNORMAL 10*6/UL
SODIUM SERPL-SCNC: 135 MMOL/L (ref 135–144)
SODIUM SERPL-SCNC: 135 MMOL/L (ref 135–144)
SURGICAL PATHOLOGY REPORT: NORMAL
SURGICAL PATHOLOGY REPORT: NORMAL
WBC OTHER # BLD: 11.3 K/UL (ref 3.5–11.3)
WBC OTHER # BLD: 11.3 K/UL (ref 3.5–11.3)

## 2023-12-01 PROCEDURE — 80076 HEPATIC FUNCTION PANEL: CPT

## 2023-12-01 PROCEDURE — 2580000003 HC RX 258: Performed by: STUDENT IN AN ORGANIZED HEALTH CARE EDUCATION/TRAINING PROGRAM

## 2023-12-01 PROCEDURE — 85025 COMPLETE CBC W/AUTO DIFF WBC: CPT

## 2023-12-01 PROCEDURE — 99024 POSTOP FOLLOW-UP VISIT: CPT | Performed by: NURSE PRACTITIONER

## 2023-12-01 PROCEDURE — 36415 COLL VENOUS BLD VENIPUNCTURE: CPT

## 2023-12-01 PROCEDURE — 6360000002 HC RX W HCPCS: Performed by: STUDENT IN AN ORGANIZED HEALTH CARE EDUCATION/TRAINING PROGRAM

## 2023-12-01 PROCEDURE — 70360 X-RAY EXAM OF NECK: CPT

## 2023-12-01 PROCEDURE — 99239 HOSP IP/OBS DSCHRG MGMT >30: CPT | Performed by: STUDENT IN AN ORGANIZED HEALTH CARE EDUCATION/TRAINING PROGRAM

## 2023-12-01 PROCEDURE — 80048 BASIC METABOLIC PNL TOTAL CA: CPT

## 2023-12-01 PROCEDURE — 71045 X-RAY EXAM CHEST 1 VIEW: CPT

## 2023-12-01 RX ORDER — MORPHINE SULFATE 2 MG/ML
2 INJECTION, SOLUTION INTRAMUSCULAR; INTRAVENOUS
Status: DISCONTINUED | OUTPATIENT
Start: 2023-12-01 | End: 2023-12-01 | Stop reason: HOSPADM

## 2023-12-01 RX ORDER — PANTOPRAZOLE SODIUM 40 MG/1
40 TABLET, DELAYED RELEASE ORAL
Qty: 30 TABLET | Refills: 3 | Status: SHIPPED | OUTPATIENT
Start: 2023-12-01

## 2023-12-01 RX ORDER — OXYCODONE HYDROCHLORIDE 5 MG/1
5 TABLET ORAL EVERY 6 HOURS PRN
Status: DISCONTINUED | OUTPATIENT
Start: 2023-12-01 | End: 2023-12-01 | Stop reason: HOSPADM

## 2023-12-01 RX ADMIN — ENOXAPARIN SODIUM 40 MG: 100 INJECTION SUBCUTANEOUS at 09:21

## 2023-12-01 RX ADMIN — SODIUM CHLORIDE, PRESERVATIVE FREE 10 ML: 5 INJECTION INTRAVENOUS at 09:21

## 2023-12-01 ASSESSMENT — PAIN SCALES - GENERAL: PAINLEVEL_OUTOF10: 0

## 2023-12-01 NOTE — PROGRESS NOTES
[facility]  Office: [phone]  Dav Kan, DO, Madelyn Moffett, DO, Iris Castro, DO, Michael Kelly, DO, Gerson Israel MD, Juju Elizalde MD, Nghia Ly MD, Man Valenzuela MD,  Deepak Ventura MD, Julia Prieto MD, Bert Gray MD,  Jan Duke MD, Ang Gonzalez MD, Al Liao DO, Cecilia Corey MD,  Raymundo Rich DO, Steven Romano MD, Elier Almazan MD, Rhonda Frank MD, Love Gallegos MD,  Berhane Stewart MD, Delfin Bain MD, Angelica Olivier MD, Jaquelin Landeros MD, Kade Yoder MD, Aiden Verdin, DO, Anjali Landrum, DO, Tiffanie Balderrama MD,  Liliane Adan MD, Dionte Erwin, Clara Collins, CNP, Shira De Leon CNP,  Lorena Prakash, [facility], Esequiel Bernabe, CNP, Mireya Love, CNP, Janice Bassett, CNP, Renay Acuña, CNP, Kassidy Tim, CNP, Leatha Hines, [address] , Leona Leung, CNP, Naomi Vieira, [facility], Marquis Neville, [facility], Skipper Na, [address]    Progress Note    12/1/2023    9:26 AM    Name:   Lieutenant Diaz  MRN:     [id number]     Acct:      [de-identified]   Room:   [address] [place] [address] Day:  9  Admit Date:  11/22/2023 10:52 PM    PCP:   No primary care provider on file. Code Status:  Full Code    Subjective:     C/C: Jaundice     Interval History Status: improved. Patient seen and examined at bedside this morning. No acute events overnight. Had Lap Marcela with General Surgery yesterday. Currently eating. Has no complaints of abdominal pain. Passing flatus. Likely will be able to discharge home today if ok with Gen Surg.     Brief History:     Lieutenant Diaz is a 74 y.o.  male who presents with jaundice  The patient was transferred from [facility] for evaluation and treatment of jaundice  Patient has underlying cognitive impairment and is unable to provide any reliable historical data  At this time patient denies any abdominal pain  He has not observed

## 2023-12-01 NOTE — DISCHARGE SUMMARY
[facility]  Office: [id number]  1826, DO, Fracisco Frias, DO, Perlita Chowdhury, DO, Maya Kelly, DO, Bob Ortiz MD, Ciera Avila MD, Evie Weinberg MD, Natali Randall MD,  Kuldeep Mckay MD, Matty Knapp MD, Marc Jimenes MD,  Christina Goddard, DO, Tucker Carreno MD, Chel Ferrera MD, Corine Steve, DO, Renee Whitaker MD,  Sandra Barnett DO, Rea Aguilera MD, Homero Li MD, Liu Guerrero MD, Panchito Green MD,  More Mendoza MD, Avril La MD, Addie Hebert MD, Holland Cox MD, Peggy Dhaliwal MD, Shakira Mendoza MD, Dipti Kumari DO, Gavin Robertson DO, Walter Blackman MD,  Ramon Solano MD, Kennedy Geronimo, CNP,  Virgie Hammond, CNP, Jennifer Lemus, CNP,  Estela Dewitt, [facility], Alex Peace, CNP, Abad Bobby, CNP, Aguila Middleton CNP, Rupert Joel, CNP, Gerson Guzman, CNP, Say Krishnamurthy PA-C, Jose Flanagan PA-C, Rosenda, CNP, Mimi Orr, CNS, Tony Denton, CNP, Kayy Morrison, [facility], Divina Trinidad, [address] [facility]    Discharge Summary     Patient ID: Lucrecia Coon  :  1955   MRN: [id number]     ACCOUNT:  [de-identified]   Patient's PCP: No primary care provider on file. Admit Date: 2023   Discharge Date: 2023   Length of Stay: 9  Code Status:  Full Code  Admitting Physician: Shakira Mendoza MD  Discharge Physician: Shakira Mendoza MD     Active Discharge Diagnoses:     Hospital Problem Lists:  Principal Problem:    Sclerosing cholangitis  Active Problems:    Hypokalemia    Obstructive jaundice    Hypocalcemia    Cognitive impairment    Obstruction of bile duct    Abnormal LFTs    H/O traumatic brain injury    S/P  shunt    Dilated cbd, acquired    Choledocholithiasis    Calculus of gallbladder with biliary obstruction but without cholecystitis  Resolved Problems:    * No resolved hospital problems.  *      Admission Condition:  fair     Discharged Condition: appointment as soon as possible for a visit  Call for appointment for follow up with a urologist for the kidney mass seen on imaging. Venita Rodriges, [address] [facility] [address] [facility]  [phone]    Call  Call office to schedule appointment for repeat EGD. Needs to be completed in 2 months    Hadley Stan, [address] [place] [address] [address]  [phone]    Schedule an appointment as soon as possible for a visit in 1 week(s)  For wound re-check       Requiring Further Evaluation/Follow Up POST HOSPITALIZATION/Incidental Findings:     Diet: regular diet    Activity: As tolerated    Instructions to Patient:  - Take all medications as prescribed  - Follow up with PCP in 1-2 weeks   - Follow up   - In case of any worsening condition please visit Emergency Room. Discharge Medications:      Medication List        START taking these medications      pantoprazole 40 MG tablet  Commonly known as: PROTONIX  Take 1 tablet by mouth 2 times daily (before meals)               Where to Get Your Medications        These medications were sent to [facility] [address], [address] [address]      Phone: [phone]   pantoprazole 40 MG tablet         No discharge procedures on file. Time Spent on discharge is  39 mins in patient examination, evaluation, counseling as well as medication reconciliation, prescriptions for required medications, discharge plan and follow up. Electronically signed by   Edvin Ca MD  12/1/2023  2:34 PM      Thank you Dr. Abdiaziz Duque primary care provider on file. for the opportunity to be involved in this patient's care.

## 2023-12-01 NOTE — DISCHARGE INSTR - DIET
Good nutrition is important when healing from an illness, injury, or surgery. Follow any nutrition recommendations given to you during your hospital stay. If you were given an oral nutrition supplement while in the hospital, continue to take this supplement at home. You can take it with meals, in-between meals, and/or before bedtime. These supplements can be purchased at most local grocery stores, pharmacies, and chain super-stores. If you have any questions about your diet or nutrition, call the hospital and ask for the dietitian. Remain on low fat diet until follow up appt complete , Akila Naidu was given instructions from resident yesterday .

## 2023-12-01 NOTE — BRIEF OP NOTE
Brief Postoperative Note      Patient: Daysi Vitale  YOB: 1955  MRN: [id number]    Date of Procedure: 11/30/2023    Pre-Op Diagnosis Codes:     * Choledocholithiasis    Post-Op Diagnosis: Same       Procedure(s):  CHOLECYSTECTOMY LAPAROSCOPIC, POSSIBLE OPEN    Surgeon(s):  Marietta Lanes, MD    Assistant:  Resident: Natacha Huber DO    Anesthesia: General    Estimated Blood Loss (mL): Minimal    Complications: None    Specimens:   ID Type Source Tests Collected by Time Destination   A :  Tissue Gallbladder SURGICAL PATHOLOGY Marietta Lanes, MD 11/30/2023 2258      Implants:  * No implants in log *      Drains: * No LDAs found *    Findings: Mild inflammation. Critical view of safety obtained. Large stones impacted in cystic neck and one in cystic duct. Cystic duct opened, stone milked out and removed from peritoneum, and then cystic duct closed with endoloop.  shunt was identified and marked preoperatively with U/S and surgical marker. The shunt was inspected at the conclusion of the case and appeared intact and unremarkable. I was present and scrubbed for all critical portions of the procedure.       Electronically signed by Marietta Lanes, MD on 11/30/2023 at 11:28 PM

## 2023-12-01 NOTE — PLAN OF CARE
Problem: Discharge Planning  Goal: Discharge to home or other facility with appropriate resources  Outcome: Completed  Flowsheets (Taken 12/1/2023 0800)  Discharge to home or other facility with appropriate resources:   Identify barriers to discharge with patient and caregiver   Arrange for needed discharge resources and transportation as appropriate   Identify discharge learning needs (meds, wound care, etc)     Problem: Safety - Adult  Goal: Free from fall injury  Outcome: Completed  Flowsheets (Taken 12/1/2023 1348)  Free From Fall Injury: Instruct family/caregiver on patient safety     Problem: ABCDS Injury Assessment  Goal: Absence of physical injury  Outcome: Completed  Flowsheets (Taken 12/1/2023 1348)  Absence of Physical Injury: Implement safety measures based on patient assessment     Problem: Confusion  Goal: Confusion, delirium, dementia, or psychosis is improved or at baseline  Description: INTERVENTIONS:  1. Assess for possible contributors to thought disturbance, including medications, impaired vision or hearing, underlying metabolic abnormalities, dehydration, psychiatric diagnoses, and notify attending LIP  2. [place] high risk fall precautions, as indicated  3. Provide frequent short contacts to provide reality reorientation, refocusing and direction  4. Decrease environmental stimuli, including noise as appropriate  5. Monitor and intervene to maintain adequate nutrition, hydration, elimination, sleep and activity  6. If unable to ensure safety without constant attention obtain sitter and review sitter guidelines with assigned personnel  7.  Initiate Psychosocial CNS and Spiritual Care consult, as indicated  Outcome: Completed  Flowsheets (Taken 12/1/2023 0800)  Effect of thought disturbance (confusion, delirium, dementia, or psychosis) are managed with adequate functional status:   Assess for contributors to thought disturbance, including medications, impaired vision or hearing, underlying metabolic abnormalities, dehydration, psychiatric diagnoses, notify [address] high risk fall precautions, as indicated   Provide frequent short contacts to provide reality reorientation, refocusing and direction   Decrease environmental stimuli, including noise as appropriate   Monitor and intervene to maintain adequate nutrition, hydration, elimination, sleep and activity     Problem: Nutrition Deficit:  Goal: Optimize nutritional status  Outcome: Completed     Problem: Pain  Goal: Verbalizes/displays adequate comfort level or baseline comfort level  Outcome: Completed  Flowsheets (Taken 12/1/2023 0800)  Verbalizes/displays adequate comfort level or baseline comfort level:   Encourage patient to monitor pain and request assistance   Assess pain using appropriate pain scale

## 2023-12-01 NOTE — OP NOTE
Operative Note      Patient: Krystal Jean  YOB: 1955  MRN: [id number]    Date of Procedure: 11/30/2023    Pre-Op Diagnosis Codes:     * Calculus of gallbladder with acute cholecystitis and obstruction [K80.01]    Post-Op Diagnosis: Same       Procedure(s):  CHOLECYSTECTOMY LAPAROSCOPIC, POSSIBLE OPEN    Surgeon(s):  Caryle Duster, MD    Assistant:   Resident: Marce Miguel DO    Anesthesia: General    Estimated Blood Loss (mL): less than 50     Complications: None    Specimens:   ID Type Source Tests Collected by Time Destination   A :  Tissue Gallbladder SURGICAL PATHOLOGY Caryle Duster, MD 11/30/2023 2258        Implants:  * No implants in log *      Drains: * No LDAs found *    Findings: Mild inflammation. Large stones impacted in cystic neck and one in cystic duct. Detailed Description of Procedure: Indication:  Patient is a 68-year-old male who presented with elevated bilirubin concerning for PSC. Patient underwent MRCP which showed dilated bile ducts with a filling defect. Patient then underwent ERCP and had sphincterotomy and stone extraction performed. Surgery was then consulted for cholecystectomy. Patient was offered laparoscopic cholecystectomy. Patient wished to proceed. Risk, benefits, and alternatives were discussed; informed consent was obtained. Procedure:  Patient was taken to the operative suite and placed supine on the operating table. Anesthesia team initiated general anesthesia. Patient was given antibiotics per the SCIP protocol. Patient was prepped and draped in typical standard fashion. Timeout was called to ensure proper patient, position, and procedure being performed. We then began our procedure by using an ultrasound to find the patient's  shunt and marking its path along the abdomen with a marker.   We then created a 5 mm incision just superior to the umbilicus elevated the skin with a penetrating towel clamp placed a Veress

## 2023-12-02 LAB
MICROORGANISM SPEC CULT: NORMAL
SERVICE CMNT-IMP: NORMAL
SPECIMEN DESCRIPTION: NORMAL

## 2023-12-04 LAB
SURGICAL PATHOLOGY REPORT: NORMAL
SURGICAL PATHOLOGY REPORT: NORMAL

## 2024-03-21 ENCOUNTER — HOSPITAL ENCOUNTER
Dept: HOSPITAL 101 - US | Age: 69
Discharge: HOME | End: 2024-03-21
Payer: MEDICARE

## 2024-03-21 DIAGNOSIS — N20.0: ICD-10-CM

## 2024-03-21 DIAGNOSIS — N28.1: Primary | ICD-10-CM

## 2024-03-21 PROCEDURE — 76775 US EXAM ABDO BACK WALL LIM: CPT

## 2024-03-21 NOTE — XMS_ITS
Comprehensive CCD (C-CDA v2.1)  
  
                           Created on: 2024  
  
  
TAWANDA VILLARREAL  
External Reference #: [id number]  
: 1955  
Sex: Male  
  
Demographics  
  
  
                                        Address             [address]  [address]  
   
                                        Home Phone          [phone]  
   
                                        Work Phone          [phone]  
   
                                        Home Phone          [phone]  
   
                                        Preferred Language  en  
   
                                        Marital Status      Single  
   
                                        Baptism Affiliation Unknown  
   
                                        Race                White  
   
                                        Ethnic Group        Not  or Lati  
no  
  
  
Author  
  
  
                                        Organization        [facility]  
  
  
Care Team Providers  
  
  
                                Care Team Member Name Role            Phone  
   
                                HOUSE, DR WINSLOW Admitting       Unavailable  
   
                                Henning, DR WINSLOW Attending       Unavailable  
   
                                Henning, DR WINSLOW Primary Care    Unavailable  
   
                                Henning, DR WINSLOW Consulting      Unavailable  
   
                                Unavailable     Primary Care Provider Unavaildanitza juarez  
   
                                Unavailable     Primary Care Provider UnavailLive Thompson Primary Care Physician [phone]  
   
                                OLI NEVES   Attending       Unavailable  
   
                                OLI NEVES   Admitting       Unavailable  
   
                                ALEXSANDRA ROSS Consulting      Unavailable  
   
                                MARZENA MOYA  Referring       Unavailable  
   
                                MELISSA MCKEON Consulting      Unavailable  
   
                                LAUREN BRICENO Consulting      Unavailable  
   
                                FELIBERTO JC Consulting      Unavailable  
   
                                KANNAN SOTOMAYOR   Consulting      Unavailable  
   
                                CHUYITA MIRANDA Consulting      Unavailable  
   
                                ZACK GUARDADO DO Attending       Unavailable  
   
                                ALEXSANDRA ROSS Consulting      Unavailable  
   
                                MARZENA MOYA  Referring       Unavailable  
   
                                ZACK GUARDADO DO Admitting       Unavailable  
   
                                Unavailable     Primary Care Provider UnavailSHERRY Levy Attending       Unavailable  
   
                                Schwab, Jodi L  Primary Care Physician [phone]  
   
                                Schwab, Jodi L  Attending       Unavailable  
   
                                Schwab, Jodi L  Attending       Unavailable  
   
                                Schwab, Jodi L  Attending       Unavailable  
   
                                Live Alexandre Attending       Unavailable  
   
                                Live Alexandre Attending       Unavailable  
   
                                Schwab, Jodi L  Attending       Unavailable  
   
                                Live Alexandre Attending       Unavailable  
   
                                Schwab, Jodi L  Attending       Unavailable  
   
                                SchwabAutumn ward  Admitting       Unavailable  
   
                                SchwabAutumn ward  Attending       Unavailable  
   
                                SchwabAutumn ward  Attending       Unavailable  
   
                                Schwab, Jodi L  Admitting       Unavailable  
  
  
  
Allergies  
  
  
                                                    Allergy   
Classification                          Reported   
Allergen(s)               Allergy Type              Date of   
Onset                     Reaction(s)               Facility  
   
                                                      
(1 source)                              No Known   
Medication   
Allergies;   
Translations:   
[No Known   
Medication   
Allergies]                              Propensity to   
adverse   
reactions   
(disorder)                                                  [facility]   
Repository  
  
  
  
Medications  
Current Medications  
  
  
  
                      Medication Drug Class(es) Dates      Sig (Normalized) Sig   
(Original)  
   
                                                    cetirizine   
hydrochloride 10 mg   
oral capsule  
(2 sources)                             Histamine-1   
Receptor   
Antagonist                              Start:   
10-                              take 1 capsule by   
mouth once daily as   
needed                                  cetirizine 10 mg   
oral capsule 10   
mg = 1 cap(s),   
Oral, Daily, PRN   
for allergy   
symptoms, # 30   
cap(s),   
Refills(s) 2,   
Pharmacy: [facility]   
[facility] #[id number], 177,   
cm, 10/27/23   
8:50:00 EDT,   
Height/Length   
Dosing, 83.1, kg,   
10/27/23 8:50:00   
EDT, Weight   
Dosing Start   
Date: 10/27/23   
Status: Ordered  
   
                                                    donepezil   
hydrochloride 10 mg   
oral tablet  
(6 sources)                                         Start:   
10-                              take 1 tablet by   
mouth once daily at   
bedtime                                 donepezil 10 mg   
Tab 10 mg = 1   
tab(s), Oral,   
Once a day (at   
bedtime), # 30   
tab(s),   
Refills(s) 2,   
Pharmacy: [facility] #[id number], 177,   
cm, 10/27/23   
8:50:00 EDT,   
Height/Length   
Dosing, 83.1, kg,   
10/27/23 8:50:00   
EDT, Weight   
Dosing Start   
Date: 10/27/23   
Status: Ordered  
  
  
  
                                                take 1 tablet by mouth at bedtim  
e donepezil (Aricept) 5 MG tablet Take 5 mg by   
mouth at bedtime 0 Active  
   
                                                                Donepezil HCl (A  
RICEPT ORAL) Take by mouth. 0   
Active  
  
  
  
                                                    memantine   
hydrochloride 5 mg   
oral tablet  
(3 sources)                             N-methyl-D-aspartate   
Receptor Antagonist                     Start:   
10-                              take 1   
tablet by   
mouth once   
daily                                   memantine 5 mg   
Tab 5 mg = 1   
tab(s), Oral,   
Daily, # 30   
tab(s),   
Refills(s) 2,   
Pharmacy: [facility]   
[facility] #[id number], 177,   
cm, 10/27/23   
8:50:00 EDT,   
Height/Length   
Dosing, 83.1, kg,   
10/27/23 8:50:00   
EDT, Weight   
Dosing Start   
Date: 10/27/23   
Status: Ordered  
   
                                                    topiramate 25 mg   
oral tablet  
(3 sources)                                                 take 1   
tablet by   
mouth once   
daily                                   topiramate   
(TOPAMAX) 25 MG   
tablet Take 25 mg   
by mouth daily. 0   
Active  
  
  
  
  
  
Problems  
Active Problems  
  
  
                                                    Problem   
Classification  Problem         Date            Documented Date Episodic/Chronic  
   
                                                    Allergic reactions  
(2 sources)     Allergic condition                 10-      Episodic  
   
                                                    Biliary tract disease  
(2 sources)                             Obstruction of bile   
duct; Translations:   
[Obstruction of bile   
duct]                                   Onset:   
2023                                          Chronic  
   
                                                    Biliary tract disease  
(2 sources)                             Calculus of   
gallbladder with   
acute cholecystitis   
with obstruction;   
Translations:   
[Calculus of   
gallbladder with   
acute cholecystitis   
with obstruction]                       Onset:   
2023                                          Episodic  
   
                                                    Epilepsy; convulsions  
(4 sources)                             Epilepsy,   
unspecified, not   
intractable, without   
status epilepticus;   
Translations:   
[EPILEPSY UNS NOT   
INTRACT W/O SE]                         Onset:   
2022                                          Chronic  
   
                                                    Genitourinary   
symptoms and   
ill-defined   
conditions  
(2 sources)     Nocturia                        10-      Episodic  
   
                                                    Intracranial injury  
(5 sources)                             Unspecified   
intracranial injury   
without loss of   
consciousness,   
initial encounter;   
Translations:   
[Traumatic brain   
injury]                                 Onset:   
2013                Episodic  
   
                                        Comment on above:   from a past accident  
   
   
                                                    Malaise and fatigue  
(2 sources)     Fatigue                         10-      Episodic  
   
                                                    Mycoses  
(1 source)                              Onychomycosis;   
Translations: [Tinea   
unguium]                                2024          Episodic  
   
                                                    Other connective   
tissue disease  
(1 source)                              Pain in both feet;   
Translations: [Pain   
in right foot]                          2024          Episodic  
   
                                                    Other diseases of   
kidney and ureters  
(1 source)      Renal mass                      2024      Chronic  
   
                                                    Other injuries and   
conditions due to   
external causes  
(2 sources)     Injury of head                  2023      Episodic  
   
                                                    Other liver diseases  
(1 source)      Jaundice                        2023      Episodic  
   
                                                    Other nervous system   
disorders  
(3 sources)                             Hydrocephalus;   
Translations:   
[Hydrocephalus,   
unspecified]                            Onset:   
2013                                          Chronic  
   
                                                    Other nervous system   
disorders  
(3 sources)                             Normal pressure   
hydrocephalus;   
Translations:   
[(Idiopathic) normal   
pressure   
hydrocephalus]                          Onset:   
10-                10-                Chronic  
   
                                                    Other screening for   
suspected conditions   
(not mental disorders   
or infectious   
disease)  
(1 source)                              Raised prostate   
specific antigen                        2024          Episodic  
   
                                                    Other skin disorders  
(1 source)                              Dystrophia unguium;   
Translations: [Nail   
dystrophy]                              2024          Episodic  
   
                                                    Other skin disorders  
(1 source)                              Change in skin   
lesion                                  2024          Episodic  
   
                                                    Other skin disorders  
(1 source)                              Changes in skin   
texture                                 2024          Episodic  
   
                                                    Residual codes;   
unclassified  
(2 sources)     Sleep disorder                  2023      Episodic  
   
                                                    Unclassified  
(6 sources)                             Patient encounter   
status                                  10-            
  
  
Past or Other Problems  
  
  
                      Problem Classification Problem    Date       Documented Da  
te Episodic/Chronic  
   
                                                    Residual codes;   
unclassified  
(3 sources)                             Altered mental   
status;   
Translations:   
[Altered mental   
status,   
unspecified]                            Onset:   
10-                                          Episodic  
  
  
  
Results  
  
  
                      Test Name  Value      Interpretation Reference Range Facil  
ity  
   
                                                    Physician Orderon 2024  
   
   
                                        Physician Order     [id number]  
30  
[id number]#1.0  
0TIFF               Normal                                  [facility]  
   
                                                    Physician Referralon   
024   
   
                                        Physician Referral  [id number]  
30  
[id number]#1  
.[id number]             Normal                                  [facility]  
   
                                                    Reminderson 2024   
   
                                        Reminders           --------------------  
-  
From: Schwab FNP, Jodi L  
To: B - Clinical;  
Sent: 3/20/2024   
08:41:38 EDT Show up:   
3/20/2024 08:42:00 EDT  
Subject: Ambulatory   
Reminder  
Due Date/Time:   
3/21/2024 08:41:00 EDT  
psa was normal  
Results:  
Date Result Name Value   
Ref Range  
3/19/2024 14:00 PSA   
Total 3.1 ng/mL (0.1 -   
3.5)  
Patient's guardian   
informed and voiced   
understanding.      Normal                                  [facility]  
   
                                                    Ambulatory Visit Summaryon 0  
3-   
   
                                                    Ambulatory Visit   
Summary                                 [Image Removed]  
TAWANDA VILLARREAL  
:1955  
MRN:[id number]  
Visit Date:2024  
Ambulatory Visit   
Instructions  
Your Diagnosis  
Elevated PSA  
BMI 26.0-26.9,adult  
Non-smoker  
Your Care Team  
Attending Physician -  
Schwab FNP, Jodi L  
Primary Care Physician   
-  
Schwab FNP, Jodi L  
This Is Your   
Medications List  
cetirizine (cetirizine   
10 mg oral capsule)  
donepezil (donepezil   
10 mg Tab)  
memantine (memantine 5   
mg Tab)  
Procedures Performed  
Shunt (),   
Cholecystectomy.  
Discharge Vitals  
Heart Rate   
(Peripheral)  
88  
Respiratory Rate  
18  
Blood Pressure  
118/84  
Height  
177 cm  
Height  
70 in  
Weight  
81.7 kg  
Weight  
179.74 lb  
BMI  
26.08  
What to do next  
Scheduled Follow-Up   
Appointments  
   
1:00 PM EDT  
With:  
Where: [facility]   
[facility]         Normal                    [address] [place]   
[address]- [phone]\.br\   
Medications\.br\   
What How Much   
When Why   
Instructions\.br  
\ Unchanged   
cetirizine   
(cetirizine 10   
mg oral capsule)   
1 Capsules By   
Mouth Every day   
as needed for   
for allergy   
symptoms   
Wellness   
examination   
Screening for   
hyperlipidemia   
Screening for   
prostate cancer   
Fatigue Nocturia   
Allergies Brain   
damage Head   
injury BMI   
26.0-26.9,adult   
Former   
smoker\.br\   
Unchanged   
donepezil   
(donepezil 10 mg   
Tab) 1 Tablets   
By Mouth Once a   
day (at bedtime)   
Wellness   
examination   
Screening for   
hyperlipidemia   
Screening for   
prostate cancer   
Fatigue Nocturia   
Allergies Brain   
damage Head   
injury BMI   
26.0-26.9,adult   
Former   
smoker\.br\   
Unchanged   
memantine   
(memantine 5 mg   
Tab) 1 Tablets   
By Mouth Every   
day Wellness   
examination   
Screening for   
hyperlipidemia   
Screening for   
prostate cancer   
Fatigue Nocturia   
Allergies Brain   
damage Head   
injury BMI   
26.0-26.9,adult   
Former   
smoker\.br\   
Allergies\.br\   
No Known   
Medication   
Allergies\.br\   
Problems\.br\   
Ongoing - Any   
problem that you   
are currently   
receiving   
treatment   
for.\.br\   
Allergies\.br\   
Elevated   
PSA\.br\   
Fatigue\.br\   
Head injury\.br\   
Jaundice\.br\   
Nocturia\.br\   
Screening for   
hyperlipidemia\.  
br\ Screening   
for prostate   
cancer\.br\   
Sleep   
disorder\.br\   
Wellness   
examination\.br\   
Patient   
Survey\.br\ You   
may receive a   
survey via text   
or e-mail asking   
about your   
office visit.   
Please share   
your experience   
with us by   
completing your   
survey. We   
appreciate your   
feedback and   
thank you for   
choosing us for   
your care.\.br\   
[Image   
Removed]\.br\                           [facility]  
   
                                                    Family Medicine Office/Clini  
c Noteon 2024   
   
                                                    Family Medicine   
Office/Clinic Note                      HPI Staff  
Tawanda is a 68 year   
old male presenting   
for follow up  
Would like re draw on   
PSA, last PSA PSA Scrn   
Tot.: 4.1 ng/mL High   
(10/27/23 09:13:00)  
Would a few moles   
looked at one of left   
side of face and a few   
one the back.  
History of Present   
Illness  
pt presents today for   
repeat PSA and has a   
couple moles he wants   
looked at  
Review of Systems  
PHQ Score  
Initial Depression   
Screen Score: 0 SCORE  
Physical Exam  
Vitals & Measurements  
HR: 88(Peripheral) RR:   
18 BP: 118/84 SpO2:   
97%  
HT: 70 in HT: 177 cm   
WT: 81.7 kg WT: 179.74   
lb BMI: 26.08  
General: alert, no   
acute distress  
ENMT: oral mucosa   
moist, no pharyngeal   
erythema or exudate  
Cardiovascular:   
regular rate and   
rhythm, normal   
peripheral perfusion  
Respiratory: Lungs   
CTA, respirations non   
labored  
Extremities: no   
deformity, no trauma  
Neurological: oriented   
x 4, LOC appropriate   
for age, CN II-XII   
intact, motor strength   
equal & normal   
bilaterally, speech   
normal  
mole on left temple   
are and on upper mid   
back dark, raised and   
change in texture  
Assessment/Plan  
1. Change in skin mole   
(D22.9: Melanocytic   
nevi, unspecified)  
one mole on left   
temple area and one on   
upper mid back has   
changed in size, color   
and texture. will   
refer to dermatology   
partners per pt   
request.  
  
2. Skin texture   
changes (R23.4:   
Changes in skin   
texture)  
see above  
  
3. Right kidney mass   
(N28.89: Other   
specified disorders of   
kidney and ureter)  
when patient was   
shipped to [facility]. a   
mass was found on   
right kidney. they did   
not have any follow up   
to monitor this mass.   
caregiver says he had   
several appointments   
but she is not   
dragging him to [facility]   
for things we can do   
locally. kidney u/s   
ordered to be done at   
[facility]  
  
4. Elevated PSA   
(R97.20: Elevated   
prostate specific   
antigen [PSA])  
psa drawn in office   
today  
Ordered:  
Lab Specimen Collect   
[id number]  
PSA Total  
  
5. BMI 26.0-26.9,adult   
(Z68.26: Body mass   
index [BMI] 26.0-26.9,   
adult)  
BMI education complete  
  
6. Non-smoker (Z78.9:   
Other specified health   
status)  
continue not smoking  
  
Follow-up  
No qualifying data   
available  
Problem List/Past   
Medical History  
Ongoing  
Allergies  
Change in skin mole  
Elevated PSA  
Fatigue  
Head injury  
Jaundice  
Nocturia  
Right kidney mass  
Screening for   
hyperlipidemia  
Screening for prostate   
cancer  
Skin texture changes  
Sleep disorder  
Wellness examination  
Historical  
No qualifying data  
Procedure/Surgical   
History  
Shunt (),   
Cholecystectomy.  
Medications  
cetirizine 10 mg oral   
capsule, 10 mg= 1   
cap(s), Oral, Daily,   
PRN, 2 refills  
donepezil 10 mg Tab,   
10 mg= 1 tab(s), Oral,   
Once a day (at   
bedtime), 2 refills  
memantine 5 mg Tab, 5   
mg= 1 tab(s), Oral,   
Daily, 2 refills  
Allergies  
No Known Medication   
Allergies  
Social History  
Tobacco  
Former smoker, quit   
more than 30 days ago   
Tobacco Use:.   
Cigarettes, Household   
tobacco concerns: No.,   
2024  
Immunizations  
Vaccine Date Status  
diphtheria/pertussis,   
acel/tetanus adult   
2020 Recorded  
influenza virus   
vaccine, inactivated   
2017 Recorded  
influenza virus   
vaccine, inactivated   
10/23/2015 Recorded Normal                                  [facility]  
   
                                        Comment on above:   Result Comment: Elec  
tronically Signed By: Schwab FNP, Jodi L\.br\Date and Time Signed: 24 14:18 EDT   
   
                                                    PSA TotalOrdered By: SYSTEM   
SYSTEM on 2024   
   
                                                    Prostate specific   
Ag [Mass/Vol]   3.1 ng/mL       Normal          0.1-3.5         Remisol Chem  
   
                                        Comment on above:   Interpretive Data: T  
he concentration of PSA determined by   
different manufacturers can vary due to differences in assay   
methods and reagent specificity. Values obtained from different   
assay methods cannot be used interchangeably. The methodology used   
for this result was chemiluminescence using [employer]   
Access Hybritech PSA reagent.   
   
                                                            Result Comment: The   
concentration of PSA determined by different   
manufacturers can vary due to differences in assay methods and   
reagent specificity. Values obtained from different assay methods   
cannot be used interchangeably. The methodology used for this   
result was chemiluminescence using [employer]'s Access   
Hybritech PSA reagent.   
   
                                                            Performed By: #### 1  
[id number] ####[facility]   
[id number] [place] [address]   
   
                                                    XR SHUNT SERIES PLACEMENT (<  
4 VIEWS)on 2023   
   
                                                    XR SHUNT SERIES   
PLACEMENT (<4   
VIEWS)                                  EXAMINATION:  
SHUNT SERIES  
2023 11:02 am  
COMPARISON:  
None.  
HISTORY:  
ORDERING SYSTEM   
PROVIDED HISTORY:   
Evaluate shunt after   
lap sukumar  
TECHNOLOGIST PROVIDED   
HISTORY:  
Evaluate shunt after   
lap sukumar  
Reason for Exam: eval   
shunt post lap sukumar  
FINDINGS:  
Right occipital shunt   
catheter is present   
extending from the   
right neck into  
the right chest wall   
and abdomen   
terminating in the   
left lower quadrant of  
the pelvis.  
No evidence of bowel   
obstruction. No   
evidence of shunt   
discontinuity or  
focal kink.  
IMPRESSION:  
Shunt catheter as   
described terminating   
in the left lower   
quadrant of the  
pelvis.  
Interpreted by:  
Adarsh Kirkland MD  
Signed by:  
Adarsh Kirkland MD  
12/3/23  
Final result        Normal                                  [facility]  
   
                                                    Cult,Bloodon 2023   
   
                                        Cult,Blood          Specimen Description  
   
.BLOOD  
Special Requests  
Culture NO GROWTH 5   
DAYS  
Report Status FINAL   
2023          Normal                                  [facility]  
   
                                        Comment on above:   Performed By: #### C  
MPX ####  
[address] [address]  
[phone]  
: John Ahumada MD   
   
                                                            Performed By: #### B  
C ####  
[facility]  
[address] [place], [place] [address]  
[phone]  
: John Ahumada MD   
   
                                        Cult,Blood          Specimen Description  
   
.BLOOD  
Special Requests  
Culture NO GROWTH 5   
DAYS  
Report Status FINAL   
2023          Normal                                  [facility]  
   
                                        Comment on above:   Performed By: #### B  
C ####  
[address] [address]  
[phone]  
: John Ahumada MD   
   
                                                    Basic Metab w/rfx MGon    
   
                                                    Creatinine   
[Mass/Vol]      0.7 mg/dL       Normal          0.7-1.2         [facility]  
   
                                        Comment on above:   Result Comment: ICTE  
[facility]   
   
                                                            Performed By: #### C  
MPX ####  
[address] [address]  
[phone]  
: John Ahumada MD   
   
                                                            Performed By: #### B  
C ####  
[address] [id number] [phone]  
: John Ahumada MD   
   
                                                    GFR/1.73 sq   
M.predicted among   
non-blacks MDRD   
(S/P/Bld) [Vol   
rate/Area]      mL/min/{1.73_m2} Normal          >60             [facility]  
   
                                        Comment on above:   Result Comment:  
These results are not intended for use in patients <18 years of   
age.  
eGFR results are calculated without a race factor using the    
CKD-EPI  
equation.  
Careful clinical correlation is recommended, particularly when   
comparing to  
results calculated using previous equations.  
The CKD-EPI equation is less accurate in patients with extremes of   
muscle mass,  
extra-renal metabolism of creatine, excessive creatine ingestion,   
or following  
therapy that affects renal tubular secretion.   
   
                                                            Performed By: #### C  
MPX ####  
[address] [address]  
[phone]  
: John Ahumada MD   
   
                                                            Performed By: #### B  
C ####  
[address] [address]  
[phone]  
: John Ahumada MD   
   
                                                    Anion gap   
[Moles/Vol]     8 mmol/L        Low             9-17            [facility]  
   
                                        Comment on above:   Performed By: #### C  
MPX ####  
[address] [address]  
[phone]  
: John Ahumada MD   
   
                                                            Performed By: #### B  
C ####  
[address] [address]  
[phone]  
: John Ahumada MD   
   
                      Calcium [Mass/Vol] 9.0 mg/dL  Normal     8.6-10.4   [facility]  
   
                                        Comment on above:   Performed By: #### C  
MPX ####  
[address] [address]  
[phone]  
: John Ahumada MD   
   
                                                            Performed By: #### B  
C ####  
[facility]  
[address] [place] [address]  
[phone]  
: John Ahumada MD   
   
                                                    Chloride   
[Moles/Vol]     103 mmol/L      Normal                    [facility]  
   
                                        Comment on above:   Performed By: #### C  
MPX ####  
[address] [address]  
[phone]  
: John Ahumada MD   
   
                                                            Performed By: #### B  
C ####  
[facility]  
[address] [place] [address]  
[phone]  
: John Ahumada MD   
   
                      CO2 [Moles/Vol] 24 mmol/L  Normal     20-31      [facility]  
   
                                        Comment on above:   Performed By: #### C  
MPX ####  
[address] [address]  
[phone]  
: John Ahumada MD   
   
                                                            Performed By: #### B  
C ####  
[address] [address]  
[phone]  
: John Ahumada MD   
   
                      Glucose [Mass/Vol] 111 mg/dL  High       70-99      [facility]  
   
                                        Comment on above:   Performed By: #### C  
MPX ####  
[address] [address]  
[phone]  
: John Ahumada MD   
   
                                                            Performed By: #### B  
C ####  
[address] [address]  
[phone]  
: John Ahumada MD   
   
                                                    Potassium   
[Moles/Vol]     4.5 mmol/L      Normal          3.7-5.3         [facility]  
   
                                        Comment on above:   Performed By: #### C  
MPX ####  
[address] [address]  
[phone]  
: oJhn Ahumada MD   
   
                                                            Performed By: #### B  
C ####  
[address] [address]  
[phone]  
: John Ahumada MD   
   
                      Sodium [Moles/Vol] 135 mmol/L Normal     135-144    [facility]  
   
                                        Comment on above:   Performed By: #### C  
MPX ####  
[address] [address]  
[phone]  
: John Ahumada MD   
   
                                                            Performed By: #### B  
C ####  
[address] [address]  
[phone]  
: John Ahumada MD   
   
                                                    Urea nitrogen   
[Mass/Vol]      14 mg/dL        Normal          8-23            [facility]  
   
                                        Comment on above:   Performed By: #### C  
MPX ####  
[address] [address]  
[phone]  
: John Ahumada MD   
   
                                                            Performed By: #### B  
C ####  
[address] [address]  
[phone]  
: John Ahumada MD   
   
                                                    CBC with Diffon 2023   
   
                      Abs. Basophil 0.05 k/uL  Normal     0.00-0.20  [facility]  
   
                                        Comment on above:   Performed By: #### C  
DP, REJEC ####  
[address] [address]  
[phone]  
: John Ahumada MD   
   
                      Abs. Eosinophil <0.03      Normal     0.00-0.44  [facility]  
   
                                        Comment on above:   Performed By: #### C  
DP, REJEC ####  
[address] [address]  
[phone]  
: John Ahumada MD   
   
                      Abs.Imm.Granulocyte 0.10 k/uL  Normal     0.00-0.30  [facility]  
   
                                        Comment on above:   Performed By: #### C  
DP, REJEC ####  
[address] [address]  
[phone]  
: John Ahumada MD   
   
                                                    Abs.Neutrophil   
(Seg)           9.29 k/uL       High            1.50-8.10       [facility]  
   
                                        Comment on above:   Performed By: #### C  
DP, REJEC ####  
[address] [address]  
[phone]  
: John Ahumada MD   
   
                                                    Basophils/100 WBC   
(Bld)           0 %             Normal          0-2             [facility]  
   
                                        Comment on above:   Performed By: #### C  
DP, REJEC ####  
[address] [address]  
[phone]  
: John Ahumada MD   
   
                                                    Eosinophils/100 WBC   
(Bld)           0 %             Low             1-4             [facility]  
   
                                        Comment on above:   Performed By: #### C  
DP, REJEC ####  
[address] [address]  
[phone]  
: John Ahumada MD   
   
                                                    Erythrocyte   
distribution width   
(RBC) [Ratio]   15.1 %          High            11.8-14.4       [facility]  
   
                                        Comment on above:   Performed By: #### C  
DP, REJEC ####  
[address] [address]  
[phone]  
: John Ahumada MD   
   
                                                    Hematocrit (Bld)   
[Volume fraction] 48.7 %          Normal          40.7-50.3       [facility]  
   
                                        Comment on above:   Performed By: #### C  
DP, REJEC ####  
[address] [address]  
[phone]  
: John Ahumada MD   
   
                                                    Hemoglobin (Bld)   
[Mass/Vol]      15.6 g/dL       Normal          13.0-17.0       [facility]  
   
                                        Comment on above:   Performed By: #### C  
DP, REJEC ####  
[address] [address]  
[phone]  
: John Ahumada MD   
   
                                                    Immature   
granulocytes/100   
WBC (Bld)       1 %             High            0               [facility]  
   
                                        Comment on above:   Performed By: #### C  
DP, REJEC ####  
[address] [address]  
[phone]  
: John Ahumada MD   
   
                                                    Lymphocytes (Bld)   
[#/Vol]         1.27 10*3/uL    Normal          1.10-3.70       [facility]  
   
                                        Comment on above:   Performed By: #### C  
DP, REJEC ####  
[address] [address]  
[phone]  
: John Ahumada MD   
   
                                                    Lymphocytes/100 WBC   
(Bld)           11 %            Low             24-43           [facility]  
   
                                        Comment on above:   Performed By: #### C  
DP, REJEC ####  
[address] [address]  
[phone]  
: John Ahumada MD   
   
                                                    MCH (RBC) [Entitic   
mass]           30.3 pg         Normal          25.2-33.5       [facility]  
   
                                        Comment on above:   Performed By: #### C  
DP, REJEC ####  
[place] [address]  
[phone]  
: John Ahumada MD   
   
                                                    MCHC (RBC)   
[Mass/Vol]      32.0 g/dL       Normal          28.4-34.8       [facility]  
   
                                        Comment on above:   Performed By: #### C  
DP, REJEC ####  
[place] [address]  
[phone]  
: John Ahumada MD   
   
                                                    MCV (RBC) [Entitic   
vol]            94.6 fL         Normal          82.6-102.9      [facility]  
   
                                        Comment on above:   Performed By: #### C  
DP, REJEC ####  
[place] [address]  
[phone]  
: John Ahumada MD   
   
                                                    Monocytes (Bld)   
[#/Vol]         0.55 10*3/uL    Normal          0.10-1.20       [facility]  
   
                                        Comment on above:   Performed By: #### C  
DP, REJEC ####  
[address] [address]  
[phone]  
: John Ahumada MD   
   
                                                    Monocytes/100 WBC   
(Bld)           5 %             Normal          3-12            [facility]  
   
                                        Comment on above:   Performed By: #### C  
DP, REJEC ####  
[address] [address]  
[phone]  
: John Ahumada MD   
   
                      Neutrophil (Seg) 83 %       High       36-65      [facility]  
   
                                        Comment on above:   Performed By: #### C  
DP, REJEC ####  
[address] [address]  
[phone]  
: John Ahumada MD   
   
                      NRBC Automated 0.0 per 100 WBC Normal     0.0        [facility]  
   
                                        Comment on above:   Performed By: #### C  
DP, REJEC ####  
[address] [address]  
[phone]  
: John Ahumada MD   
   
                                                    Platelet mean   
volume (Bld)   
[Entitic vol]   10.9 fL         Normal          8.1-13.5        [facility]  
   
                                        Comment on above:   Performed By: #### C  
DP, REJEC ####  
[address] [address]  
[phone]  
: John Ahumada MD   
   
                                                    Platelets (Bld)   
[#/Vol]         352 10*3/uL     Normal          138-453         [facility]  
   
                                        Comment on above:   Performed By: #### C  
DP, REJEC ####  
[address] [address]  
[phone]  
: John Ahumada MD   
   
                      RBC (Bld) [#/Vol] 5.15 10*6/uL Normal     4.21-5.77  [facility]  
   
                                        Comment on above:   Performed By: #### C  
DP, REJEC ####  
[address] [address]  
[phone]  
: John Ahumada MD   
   
                                                    RBC morphology   
finding Nom (Bld) ANISOCYTOSIS PRESENT Normal                          [facility]  
   
                                        Comment on above:   Performed By: #### C  
DP, REJEC ####  
[address] [address]  
[phone]  
: John Ahumada MD   
   
                      WBC (Bld) [#/Vol] 11.3 10*3/uL Normal     3.5-11.3   [facility]  
   
                                        Comment on above:   Performed By: #### C  
DP, REJEC ####  
[address] [address]  
[phone]  
: John Ahumada MD   
   
                                                    Liver Profileon 2023   
   
                      Albumin [Mass/Vol] 3.3 g/dL   Low        3.5-5.2    [facility]  
   
                                        Comment on above:   Performed By: #### C  
MPX ####  
[address] [address]  
[phone]  
: John Ahumada MD   
   
                                                            Performed By: #### B  
C ####  
[address] [address]  
[phone]  
: John Ahumada MD   
   
                      Albumin/Glob Ratio 1.1        Normal     1.0-2.5    [facility]  
   
                                        Comment on above:   Performed By: #### C  
MPX ####  
[address] [address]  
[phone]  
: John Ahumada MD   
   
                                                            Performed By: #### B  
C ####  
[address] [address]  
[phone]  
: John Ahumada MD   
   
                      Alkaline Phos 208 U/L    High            [facility]  
   
                                        Comment on above:   Performed By: #### C  
MPX ####  
[address] [address]  
[phone]  
: John Ahumada MD   
   
                                                            Performed By: #### B  
C ####  
[address] [address]  
[phone]  
: John Ahumada MD   
   
                                                    ALT [Catalytic   
activity/Vol]   40 U/L          Normal          5-41            [facility]  
   
                                        Comment on above:   Performed By: #### C  
MPX ####  
[address] [address]  
[phone]  
: John Ahumada MD   
   
                                                            Performed By: #### B  
C ####  
[address] [address]  
[phone]  
: John Ahumada MD   
   
                                                    AST [Catalytic   
activity/Vol]   30 U/L          Normal          <40             [facility]  
   
                                        Comment on above:   Performed By: #### C  
MPX ####  
[address] [address]  
[phone]  
: John Ahumada MD   
   
                                                            Performed By: #### B  
C ####  
[address] [address]  
[phone]  
: John Ahumada MD   
   
                                                    Bilirubin   
[Mass/Vol]      3.4 mg/dL       High            0.3-1.2         [facility]  
   
                                        Comment on above:   Performed By: #### C  
MPX ####  
[address] [address]  
[phone]  
: John Ahumada MD   
   
                                                            Performed By: #### B  
C ####  
[address] [address]  
[phone]  
: John Ahumada MD   
   
                      Bilirubin, Indirect 1.3 mg/dL  High       0.0-1.0    [facility]  
   
                                        Comment on above:   Performed By: #### C  
MPX ####  
[address] [address]  
[phone]  
: John Ahumada MD   
   
                                                            Performed By: #### B  
C ####  
[address] [address]  
[phone]  
: John Ahumada MD   
   
                                                    Bilirubin.indirect   
[Mass/Vol]      2.1 mg/dL       High            <0.3            [facility]  
   
                                        Comment on above:   Performed By: #### C  
MPX ####  
[address] [address]  
[phone]  
: John Ahumada MD   
   
                                                            Performed By: #### B  
C ####  
[address] [address]  
[phone]  
: John Ahumada MD   
   
                      Protein [Mass/Vol] 6.3 g/dL   Low        6.4-8.3    [facility]  
   
                                        Comment on above:   Performed By: #### C  
MPX ####  
[address] [address]  
[phone]  
: John Ahumada MD   
   
                                                            Performed By: #### B  
C ####  
[address] [address]  
[phone]  
: John Ahumada MD   
   
                                                    Specimen Rejectionon   
023   
   
                                                    Reason for   
rejection                               Unable to perform   
testing: Specimen   
hemolyzed.          Normal                                  [facility]  
   
                                        Comment on above:   Performed By: #### C  
DP, REJEC ####  
[address] [address]  
[phone]  
: John Ahumada MD   
   
                      Source of sample .BLOOD     Normal                [facility]  
   
                                        Comment on above:   Performed By: #### C  
DP, REJEC ####  
[address] [address]  
[phone]  
: John Ahumada MD   
   
                      Test ordered BMPX,LIVP  Normal                [facility]  
   
                                        Comment on above:   Performed By: #### C  
DP, REJEC ####  
[address] [address]  
[phone]  
: John Ahumada MD   
   
                                                    Surgical Pathology Reporton   
2023   
   
                                                    Surgical Pathology   
Report                                  (NOTE)  
Path Number:   
[id number]  
-- Diagnosis --  
GALLBLADDER,   
CHOLECYSTECTOMY:-CHRON  
IC CHOLECYSTITIS WITH  
CHOLELITHIASIS.  
Olga Nguyen  
**Electronically   
Signed Out**  
/2023  
Clinical Information  
Pre-op Diagnosis:   
CALCULUS OF   
GALLBLADDER WITH ACUTE   
CHOLECYSTITIS  
AND OBSTRUCTION  
Operative Findings:   
GALLBLADDER  
Operation Performed:   
CHOLECYSTECTOMY   
LAPAROSCOPIC, POSSIBLE   
OPEN  
kb  
Source of Specimen  
A: GALLBLADDER  
Gross Description  
 TAWANDA VILLARREAL   
GALLBLADDER  Received   
in formalin is an 8.8   
x 4.9 x  
4.8 cm intact   
gallbladder with a 1.0   
cm long x 1.1 cm in   
diameter  
cystic duct. The   
serosa is pink-tan and   
dusky, and the liver   
bed is  
coarse tan-brown. The   
wall is 0.1 cm in   
thickness, and the   
lumen  
contains approximately   
30 cc of thin brown   
bile with bosselated   
yellow  
calculi, 4.5 x 2.0 x   
1.5 cm in aggregate.   
The mucosa is tan,  
flattened and faintly   
trabecular. Cystic   
duct margin and   
sections of  
gallbladder mucosa   
1cs. tm  
LRD/kb2:2023  
Microscopic   
Description  
Microscopic   
examination performed.  
Processing Lab: [address] [address]  
Interpretation   
Performed at [address]   
[id number]  
SURGICAL PATHOLOGY   
CONSULTATION  
Patient Name:   
TAWANDA VILLARREAL  
[facility] Rec: [id number]  
[facility]  
CONSULTING   
[facility]   
[facility]  
ANATOMIC PATHOLOGY  
[address] [place] [address]   
[id number] [phone]  
Fax: [phone] Normal                                  [facility]  
   
                                                    XR SHUNT SERIES PLACEMENT (<  
4 VIEWS)on 2023   
   
                                                    XR SHUNT SERIES   
PLACEMENT (<4   
VIEWS)                                  EXAMINATION:  
SHUNT SERIES  
2023 11:02 am  
COMPARISON:  
None.  
HISTORY:  
ORDERING SYSTEM   
PROVIDED HISTORY:   
Evaluate shunt after   
lap sukumar  
TECHNOLOGIST PROVIDED   
HISTORY:  
Evaluate shunt after   
lap sukumar  
Reason for Exam: eval   
shunt post lap sukumar  
FINDINGS:  
Right occipital shunt   
catheter is present   
extending from the   
right neck into  
the right chest wall   
and abdomen   
terminating in the   
left lower quadrant of  
the pelvis.  
No evidence of bowel   
obstruction. No   
evidence of shunt   
discontinuity or  
focal kink.  
IMPRESSION:  
Shunt catheter as   
described terminating   
in the left lower   
quadrant of the  
pelvis.  
Interpreted by:  
Adarsh Kirkland MD  
Preliminary result  Normal                                  [facility]  
   
                                                    Basic Metabolic Profon    
   
                                                    Anion gap   
[Moles/Vol]     13 mmol/L       Normal          -            [facility]  
   
                                        Comment on above:   Performed By: #### B  
C ####  
[facility]  
[address] [place] [id number] [phone]  
: John Ahumada MD   
   
                                                            Performed By: #### C  
DP, REJEC ####  
[facility]  
[address] [place] [id number] [phone]  
: John Ahumada MD   
   
                      Calcium [Mass/Vol] 9.1 mg/dL  Normal     8.6-10.4   [facility]  
   
                                        Comment on above:   Performed By: #### B  
C ####  
[address] [address]  
[phone]  
: John Ahumada MD   
   
                                                            Performed By: #### C  
DP, REJEC ####  
[address] [address]  
[phone]  
: John Ahumada MD   
   
                                                    Chloride   
[Moles/Vol]     103 mmol/L      Normal                    [facility]  
   
                                        Comment on above:   Performed By: #### B  
C ####  
[address] [address]  
[phone]  
: John Ahumada MD   
   
                                                            Performed By: #### C  
DP, REJEC ####  
[address] [address]  
[phone]  
: John Ahumada MD   
   
                      CO2 [Moles/Vol] 22 mmol/L  Normal     20-31      [facility]  
   
                                        Comment on above:   Performed By: #### B  
C ####  
[address] [address]  
[phone]  
: John Ahumada MD   
   
                                                            Performed By: #### C  
DP, REJEC ####  
[address] [address]  
[phone]  
: John Ahumada MD   
   
                                                    Creatinine   
[Mass/Vol]      0.7 mg/dL       Normal          0.7-1.2         [facility]  
   
                                        Comment on above:   Result Comment: ICTE  
[facility]   
   
                                                            Performed By: #### B  
C ####  
[address] [address]  
[phone]  
: John Ahumada MD   
   
                                                            Performed By: #### C  
DP, REJEC ####  
[address] [address]  
[phone]  
: John Ahumada MD   
   
                                                    GFR/1.73 sq   
M.predicted among   
non-blacks MDRD   
(S/P/Bld) [Vol   
rate/Area]      mL/min/{1.73_m2} Normal          >60             [facility]  
   
                                        Comment on above:   Result Comment:  
These results are not intended for use in patients <18 years of   
age.  
eGFR results are calculated without a race factor using the    
CKD-EPI  
equation.  
Careful clinical correlation is recommended, particularly when   
comparing to  
results calculated using previous equations.  
The CKD-EPI equation is less accurate in patients with extremes of   
muscle mass,  
extra-renal metabolism of creatine, excessive creatine ingestion,   
or following  
therapy that affects renal tubular secretion.   
   
                                                            Performed By: #### B  
C ####  
[address] [address]  
[phone]  
: John Ahumada MD   
   
                                                            Performed By: #### C  
DP, REJEC ####  
[address] [address]  
[phone]  
: John Ahumada MD   
   
                      Glucose [Mass/Vol] 94 mg/dL   Normal     70-99      [facility]  
   
                                        Comment on above:   Performed By: #### B  
C ####  
[facility]  
[address] [place] [address]  
[phone]  
: John Ahumada MD   
   
                                                            Performed By: #### C  
DP, REJEC ####  
[facility]  
[address] [place] [address]  
[phone]  
: John Ahumada MD   
   
                                                    Potassium   
[Moles/Vol]     4.2 mmol/L      Normal          3.7-5.3         [facility]  
   
                                        Comment on above:   Performed By: #### B  
C ####  
[facility]  
[address] [place] [address]  
[phone]  
: John Ahumada MD   
   
                                                            Performed By: #### C  
DP, REJEC ####  
[facility]  
[address] [place] [address]  
[phone]  
: John Ahumada MD   
   
                      Sodium [Moles/Vol] 138 mmol/L Normal     135-144    [facility]  
   
                                        Comment on above:   Performed By: #### B  
C ####  
[address] [address]  
[phone]  
: John Ahumada MD   
   
                                                            Performed By: #### C  
DP, REJEC ####  
[address] [address]  
[phone]  
: John Ahumada MD   
   
                                                    Urea nitrogen   
[Mass/Vol]      17 mg/dL        Normal          8-23            [facility]  
   
                                        Comment on above:   Performed By: #### B  
C ####  
[address] [address]  
[phone]  
: John Ahumada MD   
   
                                                            Performed By: #### C  
DP, REJEC ####  
[address] [address]  
[phone]  
: John Ahumada MD   
   
                                                    CBC with Diffon 2023   
   
                      Abs. Basophil 0.03 k/uL  Normal     0.00-0.20  [facility]  
   
                                        Comment on above:   Performed By: #### B  
C ####  
[address] [address]  
[phone]  
: John Ahumada MD   
   
                                                            Performed By: #### C  
DP, REJEC ####  
[address] [address]  
[phone]  
: John Ahumada MD   
   
                      Abs.Imm.Granulocyte 0.06 k/uL  Normal     0.00-0.30  [facility]  
   
                                        Comment on above:   Performed By: #### B  
C ####  
[address] [address]  
[phone]  
: John Ahumada MD   
   
                                                            Performed By: #### C  
DP, REJEC ####  
[address] [address]  
[phone]  
: John Ahumada MD   
   
                                                    Abs.Neutrophil   
(Seg)           6.84 k/uL       Normal          1.50-8.10       [facility]  
   
                                        Comment on above:   Performed By: #### B  
C ####  
[address] [address]  
[phone]  
: John Ahumada MD   
   
                                                            Performed By: #### C  
DP, REJEC ####  
[address] [address]  
[phone]  
: John Ahumada MD   
   
                                                    Basophils/100 WBC   
(Bld)           0 %             Normal          0-2             [facility]  
   
                                        Comment on above:   Performed By: #### B  
C ####  
[address] [address]  
[phone]  
: John Ahumada MD   
   
                                                            Performed By: #### C  
DP, REJEC ####  
[address] [address]  
[phone]  
: John Ahumada MD   
   
                                                    Eosinophils (Bld)   
[#/Vol]         0.05 10*3/uL    Normal          0.00-0.44       [facility]  
   
                                        Comment on above:   Performed By: #### B  
C ####  
[address] [address]  
[phone]  
: John Ahumada MD   
   
                                                            Performed By: #### C  
DP, REJEC ####  
[address] [address]  
[phone]  
: John Ahumada MD   
   
                                                    Eosinophils/100 WBC   
(Bld)           1 %             Normal          1-4             [facility]  
   
                                        Comment on above:   Performed By: #### B  
C ####  
[address] [address]  
[phone]  
: John Ahumada MD   
   
                                                            Performed By: #### C  
DP, REJEC ####  
[address] [address]  
[phone]  
: John Ahumada MD   
   
                                                    Erythrocyte   
distribution width   
(RBC) [Ratio]   14.8 %          High            11.8-14.4       [facility]  
   
                                        Comment on above:   Performed By: #### B  
C ####  
[address] [address]  
[phone]  
: John Ahumada MD   
   
                                                            Performed By: #### C  
DP, REJEC ####  
[address] [address]  
[phone]  
: John Ahumada MD   
   
                                                    Hematocrit (Bld)   
[Volume fraction] 42.1 %          Normal          40.7-50.3       [facility]  
   
                                        Comment on above:   Performed By: #### B  
C ####  
[address] [address]  
[phone]  
: John Ahumada MD   
   
                                                            Performed By: #### C  
DP, REJEC ####  
[address] [address]  
[phone]  
: John Ahumada MD   
   
                                                    Hemoglobin (Bld)   
[Mass/Vol]      14.1 g/dL       Normal          13.0-17.0       [facility]  
   
                                        Comment on above:   Performed By: #### B  
C ####  
[address] [address]  
[phone]  
: John Ahumada MD   
   
                                                            Performed By: #### C  
DP, REJEC ####  
[address] [address]  
[phone]  
: John Ahumada MD   
   
                                                    Immature   
granulocytes/100   
WBC (Bld)       1 %             High            0               [facility]  
   
                                        Comment on above:   Performed By: #### B  
C ####  
[address] [address]  
[phone]  
: John Ahumada MD   
   
                                                            Performed By: #### C  
DP, REJEC ####  
[address] [address]  
[phone]  
: John Ahumada MD   
   
                                                    Lymphocytes (Bld)   
[#/Vol]         1.69 10*3/uL    Normal          1.10-3.70       [facility]  
   
                                        Comment on above:   Performed By: #### B  
C ####  
[address] [address]  
[phone]  
: John Ahumada MD   
   
                                                            Performed By: #### C  
DP, REJEC ####  
[address] [address]  
[phone]  
: John Ahumada MD   
   
                                                    Lymphocytes/100 WBC   
(Bld)           18 %            Low             24-43           [facility]  
   
                                        Comment on above:   Performed By: #### B  
C ####  
[address] [address]  
[phone]  
: John Ahumada MD   
   
                                                            Performed By: #### C  
DP, REJEC ####  
[address] [address]  
[phone]  
: John Ahumada MD   
   
                                                    MCH (RBC) [Entitic   
mass]           30.4 pg         Normal          25.2-33.5       [facility]  
   
                                        Comment on above:   Performed By: #### B  
C ####  
[address] [address]  
[phone]  
: John Ahumada MD   
   
                                                            Performed By: #### C  
DP, REJEC ####  
[address] [address]  
[phone]  
: John Ahumada MD   
   
                                                    MCHC (RBC)   
[Mass/Vol]      33.5 g/dL       Normal          28.4-34.8       [facility]  
   
                                        Comment on above:   Performed By: #### B  
C ####  
[address] [address]  
[phone]  
: John Ahumaad MD   
   
                                                            Performed By: #### C  
DP, REJEC ####  
[address] [address]  
[phone]  
: John Ahumada MD   
   
                                                    MCV (RBC) [Entitic   
vol]            90.7 fL         Normal          82.6-102.9      [facility]  
   
                                        Comment on above:   Performed By: #### B  
C ####  
[address] [address]  
[phone]  
: John Ahumada MD   
   
                                                            Performed By: #### C  
DP, REJEC ####  
[address] [address]  
[phone]  
: John Ahumada MD   
   
                                                    Monocytes (Bld)   
[#/Vol]         0.84 10*3/uL    Normal          0.10-1.20       [facility]  
   
                                        Comment on above:   Performed By: #### B  
C ####  
[address] [address]  
[phone]  
: John Ahumada MD   
   
                                                            Performed By: #### C  
DP, REJEC ####  
[address] [address]  
[phone]  
: John Ahumada MD   
   
                                                    Monocytes/100 WBC   
(Bld)           9 %             Normal          3-12            [facility]  
   
                                        Comment on above:   Performed By: #### B  
C ####  
[address] [address]  
[phone]  
: John Ahumada MD   
   
                                                            Performed By: #### C  
DP, REJEC ####  
[address] [address]  
[phone]  
: John Ahumada MD   
   
                      Neutrophil (Seg) 71 %       High       36-65      [facility]  
   
                                        Comment on above:   Performed By: #### B  
C ####  
[address] [address]  
[phone]  
: John Ahumada MD   
   
                                                            Performed By: #### C  
DP, REJEC ####  
[address] [address]  
[phone]  
: John Ahumada MD   
   
                      NRBC Automated 0.0 per 100 WBC Normal     0.0        [facility]  
   
                                        Comment on above:   Performed By: #### B  
C ####  
[address] [place] [address]  
[phone]  
: John Ahumada MD   
   
                                                            Performed By: #### C  
DP, REJEC ####  
[address] [address]  
[phone]  
: John Ahumada MD   
   
                                                    Platelet mean   
volume (Bld)   
[Entitic vol]   11.3 fL         Normal          8.1-13.5        [facility]  
   
                                        Comment on above:   Performed By: #### B  
C ####  
[address] [address]  
[phone]  
: John Ahumada MD   
   
                                                            Performed By: #### C  
DONA, REJEC ####  
[address] [address]  
[phone]  
: John Ahumada MD   
   
                                                    Platelets (Bld)   
[#/Vol]         283 10*3/uL     Normal          138-453         [facility]  
   
                                        Comment on above:   Performed By: #### B  
C ####  
[address] [address]  
[phone]  
: John Ahumada MD   
   
                                                            Performed By: #### C  
DONA, REJEC ####  
[address] [address]  
[phone]  
: John Ahumada MD   
   
                      RBC (Bld) [#/Vol] 4.64 10*6/uL Normal     4.21-5.77  [facility]  
   
                                        Comment on above:   Performed By: #### B  
C ####  
[address] [address]  
[phone]  
: John Ahumada MD   
   
                                                            Performed By: #### C  
DP, REJEC ####  
[address] [address]  
[phone]  
: John Ahumada MD   
   
                                                    RBC morphology   
finding Nom (Bld) ANISOCYTOSIS PRESENT Normal                          [facility]  
   
                                        Comment on above:   Performed By: #### B  
C ####  
[address] [address]  
[phone]  
: John Ahumada MD   
   
                                                            Performed By: #### C  
DP, REJEC ####  
[address] [address]  
[phone]  
: John Ahumada MD   
   
                      WBC (Bld) [#/Vol] 9.5 10*3/uL Normal     3.5-11.3   [facility]  
   
                                        Comment on above:   Performed By: #### B  
C ####  
[address] [address]  
[phone]  
: John Ahumada MD   
   
                                                            Performed By: #### C  
DONA, REJEC ####  
[address] [address]  
[phone]  
: John Ahumada MD   
   
                                                    Extra Lavender Tubeon 2023   
   
                      Extra Lavender Tube            Normal                [facility]  
   
                                        Comment on above:   Performed By: #### B  
C ####  
[address] [address]  
[phone]  
: John Ahumada MD   
   
                                                            Performed By: #### C  
DONA, REJEC ####  
[address] [address]  
[phone]  
: John Ahumada MD   
   
                                                    Liver Profileon 2023   
   
                      Albumin [Mass/Vol] 3.2 g/dL   Low        3.5-5.2    [facility]  
   
                                        Comment on above:   Performed By: #### B  
C ####  
[address] [address]  
[phone]  
: John Ahumada MD   
   
                                                            Performed By: #### C  
DP, REJEC ####  
[address] [address]  
[phone]  
: John Ahumada MD   
   
                      Albumin/Glob Ratio 1.1        Normal     1.0-2.5    [facility]  
   
                                        Comment on above:   Performed By: #### B  
C ####  
[address] [address]  
[phone]  
: John Ahumada MD   
   
                                                            Performed By: #### C  
DP, REJEC ####  
[address] [address]  
[phone]  
: John Ahumada MD   
   
                      Alkaline Phos 238 U/L    High            [facility]  
   
                                        Comment on above:   Performed By: #### B  
C ####  
[address] [address]  
[phone]  
: John Ahumada MD   
   
                                                            Performed By: #### C  
DONA, REJEC ####  
[address] [address]  
[phone]  
: John Ahumada MD   
   
                                                    ALT [Catalytic   
activity/Vol]   43 U/L          High            5-41            [facility]  
   
                                        Comment on above:   Performed By: #### B  
C ####  
[address] [address]  
[phone]  
: John Ahumada MD   
   
                                                            Performed By: #### C  
DONA, REJEC ####  
[address] [address]  
[phone]  
: John Ahumada MD   
   
                                                    AST [Catalytic   
activity/Vol]   26 U/L          Normal          <40             [facility]  
   
                                        Comment on above:   Performed By: #### B  
C ####  
[address] [address]  
[phone]  
: John Ahumada MD   
   
                                                            Performed By: #### C  
DP, REJEC ####  
[address] [address]  
[phone]  
: John Ahumada MD   
   
                                                    Bilirubin   
[Mass/Vol]      3.5 mg/dL       High            0.3-1.2         [facility]  
   
                                        Comment on above:   Performed By: #### B  
C ####  
[address] [address]  
[phone]  
: John Ahumada MD   
   
                                                            Performed By: #### C  
DP, REJEC ####  
[address] [address]  
[phone]  
: John Ahumada MD   
   
                      Bilirubin, Indirect 1.1 mg/dL  High       0.0-1.0    [facility]  
   
                                        Comment on above:   Performed By: #### B  
C ####  
[address] [address]  
[phone]  
: John Ahumada MD   
   
                                                            Performed By: #### C  
DP, REJEC ####  
[address] [address]  
[phone]  
: John Ahumada MD   
   
                                                    Bilirubin.indirect   
[Mass/Vol]      2.4 mg/dL       High            <0.3            [facility]  
   
                                        Comment on above:   Performed By: #### B  
C ####  
[address] [address]  
[phone]  
: John Ahumada MD   
   
                                                            Performed By: #### C  
DP, REJEC ####  
[address] [address]  
[phone]  
: John Ahumada MD   
   
                      Protein [Mass/Vol] 6.2 g/dL   Low        6.4-8.3    [facility]  
   
                                        Comment on above:   Performed By: #### B  
C ####  
[address] [address]  
[phone]  
: John Ahumada MD   
   
                                                            Performed By: #### C  
DP, REJEC ####  
[address] [address]  
[phone]  
: John Ahumada MD   
   
                                                    Non-Gyn Cytologyon   
3   
   
                      Case No:   [id number]    Normal                [facility]  
   
                                        Comment on above:   Performed By: #### N  
GYN ####  
[address] [address]  
[phone]  
: John Ahumada MD   
   
                                                            Performed By: #### B  
C ####  
[address] [place] [address]  
[phone]  
: John Ahumada MD   
   
                                                    XR SKULL (<4 VIEWS)on 2023   
   
                                        XR SKULL (<4 VIEWS) EXAMINATION:  
THREE XRAY VIEWS OF   
THE SKULL  
2023 4:33 pm  
COMPARISON:  
2023  
HISTORY:  
ORDERING SYSTEM   
PROVIDED HISTORY:   
obtain view   
perpendicular to shunt   
valve to  
eval setting  
TECHNOLOGIST PROVIDED   
HISTORY:  
obtain view   
perpendicular to shunt   
valve to eval setting  
Reason for Exam: shunt   
setting  
FINDINGS:  
Parietal approach   
shunt catheter in   
place. No apparent   
discontinuity or  
kinking. Bowel appears   
to be set at 1.5.  
IMPRESSION:  
Valve appears to be   
set at 1.5.  
Interpreted by:  
Akbar Reilly MD  
Signed by:  
Akbar Reilly MD  
23  
Final result        Normal                                  [facility]  
   
                                                    ED Note-Physicianon 20   
   
                                        ED Note-Physician   [id number]  
10  
[id number]  
[id number]               Normal                                  [facility]  
   
                                                    FLUORO FOR SURGICAL PROCEDUR  
ESon 2023   
   
                                                    FLUORO FOR SURGICAL   
PROCEDURES                              Radiology exam is   
complete. No   
Radiologist dictation.   
Please follow up with   
ordering provider.  
  
  
Final result        Normal                                  [facility]  
   
                                                    Surgical Pathology Reporton   
2023   
   
                                                    Surgical Pathology   
Report                                  (NOTE)  
Path Number:   
[id number]  
-- Diagnosis --  
A. Stomach, endoscopic   
biopsy:  
Mild chronic inactive   
gastritis, negative   
for Helicobacter   
pylori.  
Neither intestinal   
metaplasia nor   
dysplasia is seen.  
B. Small intestine,   
duodenum, endoscopic   
biopsy:  
Normal small   
intestinal mucosa.  
LY Dumont.  
**Electronically   
Signed Out**  
  
Clinical Information  
Pre-Op Diagnosis:   
OBSTRUCTIVE JAUNDICE  
Operative Findings:   
GASTRIC BX; DUODENAL   
BX  
Operation Performed:   
ERCP STONE REMOVAL;   
ENDOSCOPIC ULTRASOUND   
WITH  
PATHOLOGY; EGD BIOPSY  
mj  
Source of Specimen  
A: GASTRIC BX  
B: DUODENAL BX  
Gross Description  
A.  TAWANDA VILLARREAL   
GASTRIC BX  Received   
in formalin are two  
pink-tan tissue   
fragments, 0.2 and 0.8   
cm and are 1.0 x 0.2 x   
0.2 cm  
in aggregate. Entirely   
1 cs.  
B.  TAWANDA GIERLACH,   
DUODENAL BX  Received   
in formalin are two  
pink-tan tissue   
fragments, 0.3 cm each   
and are 0.6 x 0.2 x   
0.2 cm in  
aggregate. Entirely 1   
cs. jj mj  
RDD/mj:2023  
Microscopic   
Description  
A, B. 2 AMAIRANI reviewed   
for each. Microscopic   
examination performed.  
Processing Lab: [address] [address]  
Interpretation   
Performed at   
[address] [address]  
SURGICAL PATHOLOGY   
CONSULTATION  
Patient Name:   
TAWANDA VILLARREAL  
Med Rec: [id number]  
[facility]  
ANATOMIC PATHOLOGY  
[address] [place] [address]   
[phone]  
Fax: [phone] [facility]  
   
                                                    Surgical Pathology   
Report                                  (NOTE)  
Path Number:   
[id number]  
INTERPRETATION  
FINE NEEDLE ASPIRATION   
EUS ROSA HEPATIS   
LYMPH NODE:  
- NEGATIVE FOR   
MALIGNANCY.  
  
**Electronically   
Signed Out**  
Olga Nguyen  
/2023  
Source of Specimen:  
A: FINE NEEDLE   
ASPIRATION EUS ROSA   
HEPATIS LYMPH NODE  
Clinical History  
Obstructive jaundice   
K83.1.  
Gross Description  
 ROSA HEPATIS LYMPH   
NODE  Specimen   
received in CytoLyt   
solution,  
light red fluid.  
MICROSCOPIC   
DESCRIPTION  
Microscopic   
examination performed.  
Non Gyn Thin Prep x 1,   
Cell Block w/ AMAIRANI x   
1  
Processing Lab: [address] [address]  
Interpretation   
performed at [address]   
[address]  
NONGYNECOLOGICAL   
CYTOPATHOLOGY   
CONSULTATION  
Patient Name:   
TAWANDA VILLARREAL  
Med Rec: [id number]  
[facility]  
ANATOMIC PATHOLOGY  
[address] [place] [address]   
[phone]  
Fax: [phone] Normal                                  [facility]  
   
                                                    [facility]  
2023   
   
                                                    [facility]   
[facility] I                                   Radiology exam is   
complete. No   
Radiologist dictation.   
Please follow up with   
ordering provider.  
  
  
Final result        Normal                                  [facility]  
   
                                                    Anti-Mitochondrial Abon 11-2  
   
   
                                                    Anti-Mitochondrial   
Ab              4.2 U/mL        High            0.0-4.0         [facility]  
   
                                        Comment on above:   Result Comment:  
Reference Range:  
<4.0 Negative  
4.0-6.0 Equivocal  
>6.0 Positive  
When results are Equivocal, it is recommended to retest after 8-12   
weeks.   
   
                                                            Performed By: #### C  
MPX ####  
[address] [address]  
[phone]  
: John Ahumada MD   
   
                                                            Performed By: #### A  
NCACP, AMAB ####  
[place] [address]  
[phone]  
: John Ahumada MD   
   
                                                    CBC with Diffon 2023   
   
                      Abs. Basophil 0.08 k/uL  Normal     0.00-0.20  [facility]  
   
                                        Comment on above:   Performed By: #### C  
MPX ####  
[place] [address]  
[phone]  
: John Ahumada MD   
   
                                                            Performed By: #### B  
C ####  
[address] [address]  
[phone]  
: John Ahumada MD   
   
                      Abs.Imm.Granulocyte 0.06 k/uL  Normal     0.00-0.30  [facility]  
   
                                        Comment on above:   Performed By: #### C  
MPX ####  
[facility]  
[address] [place] [address]  
[phone]  
: John Ahumada MD   
   
                                                            Performed By: #### B  
C ####  
[facility]  
[address] [place] [address]  
[phone]  
: John Ahumada MD   
   
                                                    Abs.Neutrophil   
(Seg)           3.77 k/uL       Normal          1.50-8.10       [facility]  
   
                                        Comment on above:   Performed By: #### C  
MPX ####  
[address] [address]  
[phone]  
: John Ahumada MD   
   
                                                            Performed By: #### B  
C ####  
[address] [address]  
[phone]  
: John Ahumada MD   
   
                                                    Basophils/100 WBC   
(Bld)           1 %             Normal          0-2             [facility]  
   
                                        Comment on above:   Performed By: #### C  
MPX ####  
[address] [address]  
[phone]  
: John Ahumada MD   
   
                                                            Performed By: #### B  
C ####  
[address] [address]  
[phone]  
: John Ahumada MD   
   
                                                    Eosinophils (Bld)   
[#/Vol]         0.18 10*3/uL    Normal          0.00-0.44       [facility]  
   
                                        Comment on above:   Performed By: #### C  
MPX ####  
[address] [address]  
[phone]  
: John Ahumada MD   
   
                                                            Performed By: #### B  
C ####  
[address] [address]  
[phone]  
: John Ahumada MD   
   
                                                    Eosinophils/100 WBC   
(Bld)           3 %             Normal          1-4             [facility]  
   
                                        Comment on above:   Performed By: #### C  
MPX ####  
[address] [address]  
[phone]  
: John Ahumada MD   
   
                                                            Performed By: #### B  
C ####  
[address] [address]  
[phone]  
: John Ahumada MD   
   
                                                    Erythrocyte   
distribution width   
(RBC) [Ratio]   15.9 %          High            11.8-14.4       [facility]  
   
                                        Comment on above:   Performed By: #### C  
MPX ####  
[address] [address]  
[phone]  
: John Ahumada MD   
   
                                                            Performed By: #### B  
C ####  
[address] [address]  
[phone]  
: John Ahumada MD   
   
                                                    Hematocrit (Bld)   
[Volume fraction] 43.3 %          Normal          40.7-50.3       [facility]  
   
                                        Comment on above:   Performed By: #### C  
MPX ####  
[address] [address]  
[phone]  
: John Ahumada MD   
   
                                                            Performed By: #### B  
C ####  
[address] [address]  
[phone]  
: John Ahumada MD   
   
                                                    Hemoglobin (Bld)   
[Mass/Vol]      13.7 g/dL       Normal          13.0-17.0       [facility]  
   
                                        Comment on above:   Performed By: #### C  
MPX ####  
[address] [address]  
[phone]  
: John Ahumada MD   
   
                                                            Performed By: #### B  
C ####  
[address] [address]  
[phone]  
: John Ahumada MD   
   
                                                    Immature   
granulocytes/100   
WBC (Bld)       1 %             High            0               [facility]  
   
                                        Comment on above:   Performed By: #### C  
MPX ####  
[address] [address]  
[phone]  
: John Ahumada MD   
   
                                                            Performed By: #### B  
C ####  
[address] [address]  
[phone]  
: John Ahumada MD   
   
                                                    Lymphocytes (Bld)   
[#/Vol]         1.84 10*3/uL    Normal          1.10-3.70       [facility]  
   
                                        Comment on above:   Performed By: #### C  
MPX ####  
[address] [address]  
[phone]  
: John Ahumada MD   
   
                                                            Performed By: #### B  
C ####  
[address] [address]  
[phone]  
: John Ahumada MD   
   
                                                    Lymphocytes/100 WBC   
(Bld)           28 %            Normal          24-43           [facility]  
   
                                        Comment on above:   Performed By: #### C  
MPX ####  
[address] [address]  
[phone]  
: John Ahumada MD   
   
                                                            Performed By: #### B  
C ####  
[address] [address]  
[phone]  
: John Ahumada MD   
   
                                                    MCH (RBC) [Entitic   
mass]           29.8 pg         Normal          25.2-33.5       [facility]  
   
                                        Comment on above:   Performed By: #### C  
MPX ####  
[address] [address]  
[phone]  
: John Ahumada MD   
   
                                                            Performed By: #### B  
C ####  
[address] [address]  
[phone]  
: John Ahumada MD   
   
                                                    MCHC (RBC)   
[Mass/Vol]      31.6 g/dL       Normal          28.4-34.8       [facility]  
   
                                        Comment on above:   Performed By: #### C  
MPX ####  
[address] [address]  
[phone]  
: John Ahumada MD   
   
                                                            Performed By: #### B  
C ####  
[address] [address]  
[phone]  
: John Ahumada MD   
   
                                                    MCV (RBC) [Entitic   
vol]            94.3 fL         Normal          82.6-102.9      [facility]  
   
                                        Comment on above:   Performed By: #### C  
MPX ####  
[address] [address]  
[phone]  
: John Ahumada MD   
   
                                                            Performed By: #### B  
C ####  
[address] [address]  
[phone]  
: John Ahumada MD   
   
                                                    Monocytes (Bld)   
[#/Vol]         0.66 10*3/uL    Normal          0.10-1.20       [facility]  
   
                                        Comment on above:   Performed By: #### C  
MPX ####  
[address] [address]  
[phone]  
: John Ahumada MD   
   
                                                            Performed By: #### B  
C ####  
[address] [address]  
[phone]  
: John Ahumada MD   
   
                                                    Monocytes/100 WBC   
(Bld)           10 %            Normal          3-12            [facility]  
   
                                        Comment on above:   Performed By: #### C  
MPX ####  
[address] [address]  
[phone]  
: John Ahumada MD   
   
                                                            Performed By: #### B  
C ####  
[address] [address]  
[phone]  
: John Ahumada MD   
   
                      Neutrophil (Seg) 57 %       Normal     36-65      [facility]  
   
                                        Comment on above:   Performed By: #### C  
MPX ####  
[address] [address]  
[phone]  
: John Ahumada MD   
   
                                                            Performed By: #### B  
C ####  
[address] [address]  
[phone]  
: John Ahumada MD   
   
                      NRBC Automated 0.0 per 100 WBC Normal     0.0        [facility]  
   
                                        Comment on above:   Performed By: #### C  
MPX ####  
[address] [address]  
[phone]  
: John Ahumada MD   
   
                                                            Performed By: #### B  
C ####  
[address] [address]  
[phone]  
: John Ahumada MD   
   
                                                    Platelet mean   
volume (Bld)   
[Entitic vol]   10.6 fL         Normal          8.1-13.5        [facility]  
   
                                        Comment on above:   Performed By: #### C  
MPX ####  
[address] [address]  
[phone]  
: John Ahumada MD   
   
                                                            Performed By: #### B  
C ####  
[address] [address]  
[phone]  
: John Ahumada MD   
   
                                                    Platelets (Bld)   
[#/Vol]         270 10*3/uL     Normal          138-453         [facility]  
   
                                        Comment on above:   Performed By: #### C  
MPX ####  
[address] [address]  
[phone]  
: John Ahumada MD   
   
                                                            Performed By: #### B  
C ####  
[place] [address]  
[phone]  
: John Ahumada MD   
   
                      RBC (Bld) [#/Vol] 4.59 10*6/uL Normal     4.21-5.77  [facility]  
   
                                        Comment on above:   Performed By: #### C  
MPX ####  
[address] [address]  
[phone]  
: oJhn Ahumada MD   
   
                                                            Performed By: #### B  
C ####  
[address] [address]  
[phone]  
: John Ahumada MD   
   
                                                    RBC morphology   
finding Nom (Bld) ANISOCYTOSIS PRESENT Normal                          [facility]  
   
                                        Comment on above:   Performed By: #### C  
MPX ####  
[address] [address]  
[phone]  
: John Ahumada MD   
   
                                                            Performed By: #### B  
C ####  
[address] [address]  
[phone]  
: John Ahumada MD   
   
                      WBC (Bld) [#/Vol] 6.6 10*3/uL Normal     3.5-11.3   [facility]  
   
                                        Comment on above:   Performed By: #### C  
MPX ####  
[address] [address]  
[phone]  
: John Ahumada MD   
   
                                                            Performed By: #### B  
C ####  
[address] [address]  
[phone]  
: John Ahumada MD   
   
                                                    Comp Metabolic Profon 2023   
   
                                                    Creatinine   
[Mass/Vol]      0.7 mg/dL       Normal          0.7-1.2         [facility]  
   
                                        Comment on above:   Result Comment: ICTE  
[facility]   
   
                                                            Performed By: #### C  
MPX ####  
[address] [address]  
[phone]  
: John Ahumada MD   
   
                                                            Performed By: #### B  
C ####  
[address] [address]  
[phone]  
: John Ahumada MD   
   
                                                    GFR/1.73 sq   
M.predicted among   
non-blacks MDRD   
(S/P/Bld) [Vol   
rate/Area]      mL/min/{1.73_m2} Normal          >60             [facility]  
   
                                        Comment on above:   Result Comment:  
These results are not intended for use in patients <18 years of   
age.  
eGFR results are calculated without a race factor using the    
CKD-EPI  
equation.  
Careful clinical correlation is recommended, particularly when   
comparing to  
results calculated using previous equations.  
The CKD-EPI equation is less accurate in patients with extremes of   
muscle mass,  
extra-renal metabolism of creatine, excessive creatine ingestion,   
or following  
therapy that affects renal tubular secretion.   
   
                                                            Performed By: #### C  
MPX ####  
[address] [address]  
[phone]  
: John Ahumada MD   
   
                                                            Performed By: #### B  
C ####  
[address] [address]  
[phone]  
: John Ahumada MD   
   
                      Albumin [Mass/Vol] 3.2 g/dL   Low        3.5-5.2    [facility]  
   
                                        Comment on above:   Performed By: #### C  
MPX ####  
[address] [address]  
[phone]  
: John Ahumada MD   
   
                                                            Performed By: #### B  
C ####  
[address] [address]  
[phone]  
: John Ahumada MD   
   
                      Albumin/Glob Ratio 1.1        Normal     1.0-2.5    [facility]  
   
                                        Comment on above:   Performed By: #### C  
MPX ####  
[address] [address]  
[phone]  
: John Ahumada MD   
   
                                                            Performed By: #### B  
C ####  
[address] [address]  
[phone]  
: John Ahumada MD   
   
                      Alkaline Phos 300 U/L    High            [facility]  
   
                                        Comment on above:   Performed By: #### C  
MPX ####  
[address] [address]  
[phone]  
: John Ahumada MD   
   
                                                            Performed By: #### B  
C ####  
[address] [address]  
[phone]  
: John Ahumada MD   
   
                                                    ALT [Catalytic   
activity/Vol]   76 U/L          High            5-41            [facility]  
   
                                        Comment on above:   Performed By: #### C  
MPX ####  
[address] [address]  
[phone]  
: John Ahumada MD   
   
                                                            Performed By: #### B  
C ####  
[address] [address]  
[phone]  
: John Ahumada MD   
   
                                                    Anion gap   
[Moles/Vol]     11 mmol/L       Normal          9-17            [facility]  
   
                                        Comment on above:   Performed By: #### C  
MPX ####  
[address] [address]  
[phone]  
: John Ahumada MD   
   
                                                            Performed By: #### B  
C ####  
[address] [address]  
[phone]  
: John Ahumada MD   
   
                                                    AST [Catalytic   
activity/Vol]   50 U/L          High            <40             [facility]  
   
                                        Comment on above:   Performed By: #### C  
MPX ####  
[address] [address]  
[phone]  
: John Ahumada MD   
   
                                                            Performed By: #### B  
C ####  
[address] [address]  
[phone]  
: John Ahumada MD   
   
                                                    Bilirubin   
[Mass/Vol]      4.2 mg/dL       High            0.3-1.2         [facility]  
   
                                        Comment on above:   Performed By: #### C  
MPX ####  
[address] [address]  
[phone]  
: John Ahumada MD   
   
                                                            Performed By: #### B  
C ####  
[address] [address]  
[phone]  
: John Ahumada MD   
   
                      Calcium [Mass/Vol] 8.5 mg/dL  Low        8.6-10.4   [facility]  
   
                                        Comment on above:   Performed By: #### C  
MPX ####  
[address] [address]  
[phone]  
: John Ahumada MD   
   
                                                            Performed By: #### B  
C ####  
[address] [address]  
[phone]  
: John Ahumada MD   
   
                                                    Chloride   
[Moles/Vol]     105 mmol/L      Normal                    [facility]  
   
                                        Comment on above:   Performed By: #### C  
MPX ####  
[address] [address]  
[phone]  
: John Ahumada MD   
   
                                                            Performed By: #### B  
C ####  
[address] [address]  
[phone]  
: John Ahumada MD   
   
                      CO2 [Moles/Vol] 22 mmol/L  Normal     20-31      [facility]  
   
                                        Comment on above:   Performed By: #### C  
MPX ####  
[address] [address]  
[phone]  
: John Ahumada MD   
   
                                                            Performed By: #### B  
C ####  
[address] [address]  
[phone]  
: John Ahumada MD   
   
                      Glucose [Mass/Vol] 82 mg/dL   Normal     70-99      [facility]  
   
                                        Comment on above:   Performed By: #### C  
MPX ####  
[address] [address]  
[phone]  
: John Ahumada MD   
   
                                                            Performed By: #### B  
C ####  
[address] [address]  
[phone]  
: John Ahumada MD   
   
                                                    Potassium   
[Moles/Vol]     4.2 mmol/L      Normal          3.7-5.3         [facility]  
   
                                        Comment on above:   Performed By: #### C  
MPX ####  
[address] [address]  
[phone]  
: John Ahumada MD   
   
                                                            Performed By: #### B  
C ####  
[address] [address]  
[phone]  
: John Ahumada MD   
   
                      Protein [Mass/Vol] 6.2 g/dL   Low        6.4-8.3    [facility]  
   
                                        Comment on above:   Performed By: #### C  
MPX ####  
[address] [address]  
[phone]  
: John Ahumada MD   
   
                                                            Performed By: #### B  
C ####  
[address] [address]  
[phone]  
: John Ahumada MD   
   
                      Sodium [Moles/Vol] 138 mmol/L Normal     135-144    [facility]  
   
                                        Comment on above:   Performed By: #### C  
MPX ####  
[address] [address]  
[phone]  
: John Ahumada MD   
   
                                                            Performed By: #### B  
C ####  
[address] [address]  
[phone]  
: John Ahumada MD   
   
                                                    Urea nitrogen   
[Mass/Vol]      11 mg/dL        Normal          8-23            [facility]  
   
                                        Comment on above:   Performed By: #### C  
MPX ####  
[address] [address]  
[phone]  
: John Ahumada MD   
   
                                                            Performed By: #### B  
C ####  
[address] [address]  
[phone]  
: John Ahumada MD   
   
                                                    Neutrophil Cytopl Abon    
   
                      MPO-ANCA   0.3 AU/mL  Normal     0.0-3.5    [facility]  
   
                                        Comment on above:   Result Comment:  
Reference Range:  
<3.5 Negative  
3.5-5.0 Equivocal  
>5.0 Positive   
   
                                                            Performed By: #### C  
MPX ####  
[address] [address]  
[phone]  
: John Ahumada MD   
   
                                                            Performed By: #### A  
NCACP, AMAB ####  
[address] [address]  
[phone]  
: John Ahumada MD   
   
                      PR3-ANCA   <0.7       Normal     0.0-2.0    [facility]  
   
                                        Comment on above:   Result Comment:  
Reference Range:  
<2.0 Negative  
2.0-3.0 Equivocal  
>3.0 Positive   
   
                                                            Performed By: #### C  
MPX ####  
[facility]  
[address] [place] [address]  
[phone]  
: John Ahumada MD   
   
                                                            Performed By: #### A  
NCACP, AMAB ####  
[facility]  
[address] [place] [address]  
[phone]  
: John Ahumada MD   
   
                                                    XR SKULL (<4 VIEWS)on 2023   
   
                                        XR SKULL (<4 VIEWS) EXAMINATION:  
3 XRAY VIEWS OF THE   
SKULL  
2023 6:20 pm  
COMPARISON:  
2023 at 11:00   
a.m.  
HISTORY:  
ORDERING SYSTEM   
PROVIDED HISTORY: f/u   
shunt setting after   
MRI, please do  
perpendicular to shunt  
TECHNOLOGIST PROVIDED   
HISTORY:  
f/u shunt setting   
after MRI, please do   
perpendicular to shunt  
Reason for Exam: f/u   
shunt setting after   
MRI. perpendicular to   
shunt.  
FINDINGS:  
There is right   
parietal   
ventriculoperitoneal   
shunt tube in position   
noted.  
The intracerebral and   
extracranial   
components of the    
shunt appear to be  
intact without kinking   
and without   
discontinuity.  
IMPRESSION:  
Intact components of   
the right   
ventriculoperitoneal   
shunt tube without any  
kinking.  
Interpreted by:  
Markel Nowak MD  
Signed by:  
Markel Nowak MD  
23  
Final result        Normal                                  [facility]  
   
                                        XR SKULL (<4 VIEWS) EXAMINATION:  
THREE XRAY VIEWS OF   
THE SKULL  
2023 5:54 pm  
COMPARISON:  
None  
HISTORY:  
ORDERING SYSTEM   
PROVIDED HISTORY:   
shunt setting check,   
please do  
perpendicular to shunt  
TECHNOLOGIST PROVIDED   
HISTORY:  
shunt setting check,   
please do   
perpendicular to shunt  
Reason for Exam: Shunt   
setting  
FINDINGS:  
There is   
redemonstration of a   
 shunt catheter. No   
evidence of catheter  
disruption or kinking.  
The patient is   
edentulous. The   
calvarium is intact.  
IMPRESSION:  
 shunt catheter   
stable in position. No   
evidence of catheter   
disruption or  
kinking.  
Interpreted by:  
Trip Arenas MD  
Signed by:  
Trip Arenas MD  
23  
Final result        Normal                                  [facility]  
   
                                                    LAXMI Screen w/reflexon 2023   
   
                      LAXMI Screen Negative   Normal     NEG        [facility]  
   
                                        Comment on above:   Performed By: #### C  
DP, REJEC ####  
[address] [address]  
[phone]  
: John Ahumada MD   
   
                                                            Performed By: #### A  
ELIZABETH, AMAB ####  
[address] [address]  
[phone]  
: John Ahumada MD   
   
                      Anti-dsDNA 2.3 IU/mL  Normal     <10.0      [facility]  
   
                                        Comment on above:   Result Comment:  
Reference Range:  
<10.0 Negative  
10.0-15.0 Equivocal  
>15.0 Positive   
   
                                                            Performed By: #### C  
DP, REJEC ####  
[address] [address]  
[phone]  
: John Ahumada MD   
   
                                                            Performed By: #### A  
NCACFREEMAN, AMAB ####  
[address] [address]  
[phone]  
: John Ahumada MD   
   
                      CURTIS Screen 0.2 U/mL   Normal     <0.7       [facility]  
   
                                        Comment on above:   Result Comment:  
Reference Range:  
<0.7 Negative  
0.7-1.0 Equivocal  
>1.0 Positive  
CURTIS Screen includes   
U1RNP,RNP70,Sm,Ro(SS-A),La(SS-B),CENP,Scl-70,Taryn-1   
   
                                                            Performed By: #### C  
DP, REJEC ####  
[address] [address]  
[phone]  
: John Ahumada MD   
   
                                                            Performed By: #### A  
ELIZABETH, AMAB ####  
[address] [address]  
[phone]  
: John Ahumada MD   
   
                                                    IgG Subclasseson 2023   
   
                      IgG subclass 1 757 mg/dL  Normal     240-1118   [facility]  
   
                                        Comment on above:   Result Comment: (NOT  
E)  
REFERENCE INTERVAL: Immunoglobulin G Subclass 1  
The total IgG (mg/dL) can be derived from the sum of the subclass  
IgG1, IgG2, IgG3, and IgG4 values. However, a confirmatory and  
more precise total IgG is available by the turbidimetric method of  
quantitation for total IgG. Refer to test Immunoglobulin G, Serum  
(4372304).  
Access complete set of age- and/or gender-specific reference  
intervals for this test in the [facility] Test Directory  
([email or web address]).   
   
                                                            Performed By: #### C  
DP, REJEC ####  
[address] [address]  
[phone]  
: John Ahumada MD   
   
                                                            Performed By: #### A  
NCACP, AMAB ####  
[address] [address]  
[phone]  
: John Ahumada MD   
   
                      IgG subclass 2 268 mg/dL  Normal     124-549    [facility]  
   
                                        Comment on above:   Result Comment: (NOT  
E)  
REFERENCE INTERVAL: Immunoglobulin G Subclass 2  
Access complete set of age- and/or gender-specific reference  
intervals for this test in the [facility] Test Directory  
([email or web address]).   
   
                                                            Performed By: #### C  
DP, REJEC ####  
[address] [address]  
[phone]  
: John Ahumada MD   
   
                                                            Performed By: #### A  
ELIZABETH, AMAB ####  
[address] [address]  
[phone]  
: John Ahumada MD   
   
                      IgG subclass 3 31 mg/dL   Normal          [facility]  
   
                                        Comment on above:   Result Comment: (NOT  
E)  
REFERENCE INTERVAL: Immunoglobulin G Subclass 3  
Access complete set of age- and/or gender-specific reference  
intervals for this test in the [facility] Test Directory  
([email or web address]).   
   
                                                            Performed By: #### C  
DP, REJEC ####  
[address] [address]  
[phone]  
: John Ahumada MD   
   
                                                            Performed By: #### A  
NCACP, AMAB ####  
[address] [address]  
[phone]  
: John Ahumada MD   
   
                      IgG subclass 4 16 mg/dL   Normal     1-123      [facility]  
   
                                        Comment on above:   Result Comment: (NOT  
E)  
REFERENCE INTERVAL: Immunoglobulin G Subclass 4  
Access complete set of age- and/or gender-specific reference  
intervals for this test in the [facility] Test Directory  
([email or web address]).  
Performed By: [facility]  
[address] [place] [address]  
: Alejandro Hernandez MD, PhD  
CLIA Number: [id number]   
   
                                                            Performed By: #### C  
DP, REJEC ####  
[facility]  
[address] [place], [place] [id number] [phone]  
: John Ahumada MD   
   
                                                            Performed By: #### A  
NCACP, AMAB ####  
[facility]  
[address] [place], [place] [id number] [phone]  
: John Ahumada MD   
   
                                                    MRI ABDOMEN W WO CONTRAST MR  
CPon 2023   
   
                                                    MRI ABDOMEN W WO   
CONTRAST MRCP                           EXAMINATION:  
MRI OF THE ABDOMEN   
WITH AND WITHOUT   
CONTRAST AND MRCP   
2023 5:16 pm  
TECHNIQUE:  
Multiplanar   
multisequence MRI of   
the abdomen was   
performed without and   
with  
the administration of   
intravenous contrast.   
After initial T2 axial   
and  
coronal images, thick   
slab, thin slab and 3D   
coronal MRCP sequences   
were  
obtained without the   
administration of   
intravenous contrast.   
MIP images are  
provided for review.  
COMPARISON:  
None available.  
HISTORY:  
Cholangitis.  
FINDINGS:  
Image quality   
significantly degraded   
by motion artifact.  
No definite hepatic   
steatosis. No liver   
lesion seen. Hepatic   
vasculature  
appears to be patent.  
Gallbladder contains   
stones and sludge but   
there is no acute   
inflammation.  
Left greater than   
right central   
intrahepatic biliary   
ductal dilatation. The  
extrahepatic bile duct   
measures up to 12 mm   
and tapers distally   
with no  
definite intraductal   
filling defect seen.   
Pancreatic duct is not   
dilated and  
is no pancreas   
divisum.  
Spleen, pancreas, left   
kidney, and adrenals   
are unremarkable. At   
the upper  
pole of the right   
kidney, there is a   
complicated cystic   
mass containing  
multiple septations   
which measures 6.6 x   
6.4 x 6.0 cm without   
definite  
internal enhancement.  
No ascites or   
significant   
lymphadenopathy.   
Visualized osseous   
structures and  
gastrointestinal tract   
are unremarkable.   
Abdominal aorta is   
normal caliber.  
IMPRESSION:  
1. Cholelithiasis   
without definite acute   
cholecystitis.  
2. Extrahepatic   
biliary ductal   
dilatation measuring   
up to 12 mm without  
definite   
choledocholithiasis.  
3. At the upper pole   
of the right kidney,   
there is a 6.6 cm   
complicated  
cystic mass without   
definite enhancement   
compatible with a   
Bosniak 2F lesion.  
This can be   
re-evaluated on 6   
month follow-up.   
Suggest urology   
consultation.  
Interpreted by:  
Cecil Garcia MD  
Signed by:  
Cecil Garcia MD  
23  
Final result        Normal                                  [facility]  
   
                                                    RAD - MISCon 2023   
   
                                        RAD - MISC          [phone]  
04  
[id number]#[id number]  
TIFF                Normal                                  [facility]  
   
                                                    XR ABDOMEN (KUB) (SINGLE AP   
VIEW)on 2023   
   
                                                    XR ABDOMEN (KUB)   
(SINGLE AP VIEW)                        EXAMINATION:  
ONE SUPINE XRAY   
VIEW(S) OF THE ABDOMEN  
2023 11:12 am  
COMPARISON:  
None  
HISTORY:  
ORDERING SYSTEM   
PROVIDED HISTORY: MRI   
clearance -  shunt  
TECHNOLOGIST PROVIDED   
HISTORY:  
MRI clearance -    
shunt  
Reason for Exam: MRI   
clearance  
FINDINGS:  
 shunt catheter   
projects curving in   
the right upper   
quadrant and  
terminating in the   
left lower quadrant   
near the left pelvic   
superior brim.  
No catheter   
discontinuity seen. No   
abnormal   
calcifications seen.   
Mild  
spondylosis of the   
lower lumbar spine.   
Nonobstructive bowel   
gas pattern.  
IMPRESSION:  
 shunt catheter   
terminating in the   
left lower quadrant.  
Interpreted by:  
Alondra Agustin DO  
Signed by:  
Alondra Agustin DO  
23  
Final result        Normal                                  [facility]  
   
                                                    XR CHEST (2 VW)on 2023  
   
   
                                        XR CHEST (2 VW)     EXAMINATION:  
TWO XRAY VIEWS OF THE   
CHEST  
2023 11:12 am  
COMPARISON:  
None.  
HISTORY:  
ORDERING SYSTEM   
PROVIDED HISTORY: MRI   
clearance -  shunt  
TECHNOLOGIST PROVIDED   
HISTORY:  
MRI clearance -    
shunt  
Reason for Exam: MRI   
clearance  
FINDINGS:  
 shunt catheter   
projects over the   
anterior right medial   
chest. It projects  
into the right upper   
quadrant. No catheter   
discontinuity seen. No   
pleural  
effusion, pneumothorax   
or focal consolidation   
in the chest. The   
heart is not  
enlarged. Old   
right-sided rib   
fractures.  
IMPRESSION:  
Unremarkable  shunt   
catheter coursing   
along the anterior   
right medial chest.  
Interpreted by:  
Alondra Agustin DO  
Signed by:  
Alondra Agustin DO  
23  
Final result        Normal                                  [facility]  
   
                                                    XR SKULL (<4 VIEWS)on 2023   
   
                                        XR SKULL (<4 VIEWS) EXAMINATION:  
THREE XRAY VIEWS OF   
THE SKULL  
2023 11:12 am  
COMPARISON:  
2023  
HISTORY:  
ORDERING SYSTEM   
PROVIDED HISTORY:   
verify shunt setting,   
please do  
perpendicular to shunt   
valve  
TECHNOLOGIST PROVIDED   
HISTORY:  
verify shunt setting,   
please do   
perpendicular to shunt   
valve  
Reason for Exam:   
Perpendicular to   
shunt, verify type of   
shunt for MRI  
FINDINGS:  
Grossly stable   
position of a right   
parietal  shunt. No   
evident kinks or  
discontinuities in the   
visualized tubing. No   
acute fracture.  
IMPRESSION:  
Grossly stable   
position of a right   
parietal  shunt.  
Interpreted by:  
Inderjit Dowd MD  
Signed by:  
Inderjit Dowd MD  
23  
Final result        Normal                                  [facility]  
   
                                                    Comp Metabolic Pr/rfx MGon 1  
2023   
   
                                                    AST [Catalytic   
activity/Vol]   74 U/L          High            <40             [facility]  
   
                                        Comment on above:   Performed By: #### C  
MPX ####  
[facility]  
[address] [place] [id number] [phone]  
: John Ahumada MD   
   
                                                    Creatinine   
[Mass/Vol]      0.6 mg/dL       Low             0.7-1.2         [facility]  
   
                                        Comment on above:   Result Comment: ICTE  
[facility] SPE[facility]   
   
                                                            Performed By: #### C  
MPX ####  
[facility]  
[address] [place] [id number] [phone]  
: John Ahumada MD   
   
                                                    GFR/1.73 sq   
M.predicted among   
non-blacks MDRD   
(S/P/Bld) [Vol   
rate/Area]      mL/min/{1.73_m2} Normal          >60             [facility]  
   
                                        Comment on above:   Result Comment:  
These results are not intended for use in patients <18 years of   
age.  
eGFR results are calculated without a race factor using the    
CKD-EPI  
equation.  
Careful clinical correlation is recommended, particularly when   
comparing to  
results calculated using previous equations.  
The CKD-EPI equation is less accurate in patients with extremes of   
muscle mass,  
extra-renal metabolism of creatine, excessive creatine ingestion,   
or following  
therapy that affects renal tubular secretion.   
   
                                                            Performed By: #### C  
MPX ####  
[address] [place] [address]  
[phone]  
: John Ahumada MD   
   
                      Albumin [Mass/Vol] 3.3 g/dL   Low        3.5-5.2    [facility]  
   
                                        Comment on above:   Performed By: #### C  
MPX ####  
[address] [address]  
[phone]  
: John Ahumada MD   
   
                      Albumin/Glob Ratio 1.1        Normal     1.0-2.5    [facility]  
   
                                        Comment on above:   Performed By: #### C  
MPX ####  
[address] [address]  
[phone]  
: John Ahumada MD   
   
                      Alkaline Phos 362 U/L    High            [facility]  
   
                                        Comment on above:   Performed By: #### C  
MPX ####  
[address] [address]  
[phone]  
: John Ahumada MD   
   
                                                    ALT [Catalytic   
activity/Vol]   96 U/L          High            5-41            [facility]  
   
                                        Comment on above:   Performed By: #### C  
MPX ####  
[address] [address]  
[phone]  
: John Ahumada MD   
   
                                                    Anion gap   
[Moles/Vol]     12 mmol/L       Normal          9-17            [facility]  
   
                                        Comment on above:   Performed By: #### C  
MPX ####  
[address] [address]  
[phone]  
: John Ahumada MD   
   
                                                    Bilirubin   
[Mass/Vol]      6.1 mg/dL       High            0.3-1.2         [facility]  
   
                                        Comment on above:   Performed By: #### C  
MPX ####  
[address] [address]  
[phone]  
: John Ahumada MD   
   
                      Calcium [Mass/Vol] 8.8 mg/dL  Normal     8.6-10.4   [facility]  
   
                                        Comment on above:   Performed By: #### C  
MPX ####  
[address] [address]  
[phone]  
: John Ahumada MD   
   
                                                    Chloride   
[Moles/Vol]     100 mmol/L      Normal                    [facility]  
   
                                        Comment on above:   Performed By: #### C  
MPX ####  
[address] [address]  
[phone]  
: John Ahumada MD   
   
                      CO2 [Moles/Vol] 22 mmol/L  Normal     20-31      [facility]  
   
                                        Comment on above:   Performed By: #### C  
MPX ####  
[address] [address]  
[phone]  
: John Ahumada MD   
   
                      Glucose [Mass/Vol] 83 mg/dL   Normal     70-99      [facility]  
   
                                        Comment on above:   Performed By: #### C  
MPX ####  
[address] [address]  
[phone]  
: John Ahumada MD   
   
                                                    Potassium   
[Moles/Vol]     4.0 mmol/L      Normal          3.7-5.3         [facility]  
   
                                        Comment on above:   Performed By: #### C  
MPX ####  
[address] [address]  
[phone]  
: John Ahumada MD   
   
                      Protein [Mass/Vol] 6.4 g/dL   Normal     6.4-8.3    [facility]  
   
                                        Comment on above:   Performed By: #### C  
MPX ####  
[address] [address]  
[phone]  
: John Ahumada MD   
   
                      Sodium [Moles/Vol] 134 mmol/L Low        135-144    [facility]  
   
                                        Comment on above:   Performed By: #### C  
MPX ####  
[address] [address]  
[phone]  
: John Ahumada MD   
   
                                                    Urea nitrogen   
[Mass/Vol]      9 mg/dL         Normal          8-23            [facility]  
   
                                        Comment on above:   Performed By: #### C  
MPX ####  
[facility]  
[facility] [place] [address] [phone]  
: John Ahumada MD   
   
                                                    Smooth Muscle Abon   
3   
   
                      Smooth Muscle Ab 6 Units    Normal     0-19       [facility]  
   
                                        Comment on above:   Result Comment: (NOT  
E)  
If F-Actin (Smooth Muscle) Antibody, IgG is negative, the Smooth  
Muscle Antibody titer by IFA is not performed.  
REFERENCE INTERVAL: F-Actin (Smooth Muscle) Antibody, IgG by  
ARIEL  
19 Units or less ....... Negative  
20 - 30 Units .......... Weak Positive-Suggest repeat  
testing in two to three weeks  
with fresh specimen.  
31 Units or greater..... Positive-Suggestive of  
autoimmune hepatitis type 1  
or chronic active hepatitis.  
F-actin IgG antibodies have been shown to have increased  
sensitivity for autoimmune hepatitis (AIH) but lower specificity  
than smooth muscle antibodies (SMA). F-actin IgG antibodies can  
also be seen in SMA-negative disease controls (non-AIH),  
especially in patients with primary biliary cirrhosis and chronic  
hepatitis C infections. Some patients with AIH may be SMA-positive  
but negative for F-actin IgG. Consider testing for SMA by IFA if  
suspicion for AIH is strong.  
Performed By: [facility]  
[address] [place] [address]  
: Alejandro Hernandez MD, PhD  
CLIA Number: [id number]   
   
                                                            Performed By: #### C  
DP, REJEC ####  
[facility]  
[facility] [place] [address] [phone]  
: John Ahumada MD   
   
                                                            Performed By: #### A  
NCACP, AMAB ####  
[facility]  
[facility] [place] [address] [phone]  
: John Ahumada MD   
   
                                                    XR SKULL (<4 VIEWS)on 2023   
   
                                        XR SKULL (<4 VIEWS) EXAMINATION:  
THREE XRAY VIEWS OF   
THE SKULL  
2023 9:40 am  
COMPARISON:  
None.  
HISTORY:  
ORDERING SYSTEM   
PROVIDED HISTORY:   
clear  shunt for MRI  
TECHNOLOGIST PROVIDED   
HISTORY:  
clear  shunt for MRI  
FINDINGS:  
There is an intact   
shunt tube from a   
right parietal   
approach. There is no  
acute osseous   
abnormality. The   
surrounding soft   
tissues are   
unremarkable.  
IMPRESSION:  
Shunt tube from a   
right parietal   
approach that is   
intact. Correlate with  
expected documented   
information in regards   
to make/model of shunt   
tube to  
ensure compatibility.  
Interpreted by:  
Jarred Henson MD  
Signed by:  
Jarred Henson MD  
23  
Final result        Normal                                  [facility]  
   
                                                    CBC with Diffon 2023   
   
                      Abs. Basophil 0.07 k/uL  Normal     0.00-0.20  [facility]  
   
                                        Comment on above:   Performed By: #### C  
MPX ####  
[place] [address]  
[phone]  
: John Ahumada MD   
   
                      Abs.Imm.Granulocyte 0.08 k/uL  Normal     0.00-0.30  [facility]  
   
                                        Comment on above:   Performed By: #### C  
MPX ####  
[address] [address]  
[phone]  
: John Ahumada MD   
   
                                                    Abs.Neutrophil   
(Seg)           4.34 k/uL       Normal          1.50-8.10       [facility]  
   
                                        Comment on above:   Performed By: #### C  
MPX ####  
[address] [address]  
[phone]  
: John Ahumada MD   
   
                                                    Basophils/100 WBC   
(Bld)           1 %             Normal          0-2             [facility]  
   
                                        Comment on above:   Performed By: #### C  
MPX ####  
[address] [address]  
[phone]  
: John Ahumada MD   
   
                                                    Eosinophils (Bld)   
[#/Vol]         0.17 10*3/uL    Normal          0.00-0.44       [facility]  
   
                                        Comment on above:   Performed By: #### C  
MPX ####  
[place] [address]  
[phone]  
: John Ahumada MD   
   
                                                    Eosinophils/100 WBC   
(Bld)           3 %             Normal          1-4             [facility]  
   
                                        Comment on above:   Performed By: #### C  
MPX ####  
[address] [address]  
[phone]  
: John Ahumada MD   
   
                                                    Erythrocyte   
distribution width   
(RBC) [Ratio]   16.2 %          High            11.8-14.4       [facility]  
   
                                        Comment on above:   Performed By: #### C  
MPX ####  
[address] [address]  
[phone]  
: John Ahumada MD   
   
                                                    Hematocrit (Bld)   
[Volume fraction] 43.7 %          Normal          40.7-50.3       [facility]  
   
                                        Comment on above:   Performed By: #### C  
MPX ####  
[address] [address]  
[phone]  
: John Ahumada MD   
   
                                                    Hemoglobin (Bld)   
[Mass/Vol]      13.9 g/dL       Normal          13.0-17.0       [facility]  
   
                                        Comment on above:   Performed By: #### C  
MPX ####  
[address] [address]  
[phone]  
: John Ahumada MD   
   
                                                    Immature   
granulocytes/100   
WBC (Bld)       1 %             High            0               [facility]  
   
                                        Comment on above:   Performed By: #### C  
MPX ####  
[address] [address]  
[phone]  
: John Ahumada MD   
   
                                                    Lymphocytes (Bld)   
[#/Vol]         1.47 10*3/uL    Normal          1.10-3.70       [facility]  
   
                                        Comment on above:   Performed By: #### C  
MPX ####  
[address] [address]  
[phone]  
: John Ahumada MD   
   
                                                    Lymphocytes/100 WBC   
(Bld)           22 %            Low             24-43           [facility]  
   
                                        Comment on above:   Performed By: #### C  
MPX ####  
[address] [address]  
[phone]  
: John Ahumada MD   
   
                                                    MCH (RBC) [Entitic   
mass]           29.8 pg         Normal          25.2-33.5       [facility]  
   
                                        Comment on above:   Performed By: #### C  
MPX ####  
[address] [address]  
[phone]  
: John Ahumada MD   
   
                                                    MCHC (RBC)   
[Mass/Vol]      31.8 g/dL       Normal          28.4-34.8       [facility]  
   
                                        Comment on above:   Performed By: #### C  
MPX ####  
[address] [address]  
[phone]  
: John Ahumada MD   
   
                                                    MCV (RBC) [Entitic   
vol]            93.6 fL         Normal          82.6-102.9      [facility]  
   
                                        Comment on above:   Performed By: #### C  
MPX ####  
[address] [address]  
[phone]  
: John Ahumada MD   
   
                                                    Monocytes (Bld)   
[#/Vol]         0.59 10*3/uL    Normal          0.10-1.20       [facility]  
   
                                        Comment on above:   Performed By: #### C  
MPX ####  
[address] [address]  
[phone]  
: John Ahumada MD   
   
                                                    Monocytes/100 WBC   
(Bld)           9 %             Normal          3-12            [facility]  
   
                                        Comment on above:   Performed By: #### C  
MPX ####  
[address] [address]  
[phone]  
: John Ahumada MD   
   
                      Neutrophil (Seg) 64 %       Normal     36-65      [facility]  
   
                                        Comment on above:   Performed By: #### C  
MPX ####  
[address] [address]  
[phone]  
: John Ahumada MD   
   
                      NRBC Automated 0.0 per 100 WBC Normal     0.0        [facility]  
   
                                        Comment on above:   Performed By: #### C  
MPX ####  
[address] [address]  
[phone]  
: John Ahumada MD   
   
                                                    Platelet mean   
volume (Bld)   
[Entitic vol]   10.8 fL         Normal          8.1-13.5        [facility]  
   
                                        Comment on above:   Performed By: #### C  
MPX ####  
[address] [address]  
[phone]  
: John Ahumada MD   
   
                                                    Platelets (Bld)   
[#/Vol]         251 10*3/uL     Normal          138-453         [facility]  
   
                                        Comment on above:   Performed By: #### C  
MPX ####  
[address] [address]  
[phone]  
: John Ahumada MD   
   
                      RBC (Bld) [#/Vol] 4.67 10*6/uL Normal     4.21-5.77  [facility]  
   
                                        Comment on above:   Performed By: #### C  
MPX ####  
[address] [address]  
[phone]  
: John Ahumada MD   
   
                                                    RBC morphology   
finding Nom (Bld) ANISOCYTOSIS PRESENT Normal                          [facility]  
   
                                        Comment on above:   Performed By: #### C  
MPX ####  
[address] [address]  
[phone]  
: John Ahumada MD   
   
                      WBC (Bld) [#/Vol] 6.7 10*3/uL Normal     3.5-11.3   [facility]  
   
                                        Comment on above:   Performed By: #### C  
MPX ####  
[address] [address]  
[phone]  
: John Ahumada MD   
   
                                                    Comp Metabolic Pr/rfx MGon 1  
2023   
   
                                                    Creatinine   
[Mass/Vol]      0.5 mg/dL       Low             0.7-1.2         [facility]  
   
                                        Comment on above:   Result Comment: ICTE  
[facility]   
   
                                                            Performed By: #### C  
MPX ####  
[facility]  
[address] [place] [address]  
[phone]  
: John Ahumada MD   
   
                                                    GFR/1.73 sq   
M.predicted among   
non-blacks MDRD   
(S/P/Bld) [Vol   
rate/Area]      mL/min/{1.73_m2} Normal          >60             [facility]  
   
                                        Comment on above:   Result Comment:  
These results are not intended for use in patients <18 years of   
age.  
eGFR results are calculated without a race factor using the    
CKD-EPI  
equation.  
Careful clinical correlation is recommended, particularly when   
comparing to  
results calculated using previous equations.  
The CKD-EPI equation is less accurate in patients with extremes of   
muscle mass,  
extra-renal metabolism of creatine, excessive creatine ingestion,   
or following  
therapy that affects renal tubular secretion.   
   
                                                            Performed By: #### C  
MPX ####  
[facility]  
[address] [place] [address]  
[phone]  
: John Ahumada MD   
   
                      Albumin [Mass/Vol] 3.1 g/dL   Low        3.5-5.2    [facility]  
   
                                        Comment on above:   Performed By: #### C  
MPX ####  
[facility]  
[address] [place] [address]  
[phone]  
: John Ahumada MD   
   
                      Albumin/Glob Ratio 1.1        Normal     1.0-2.5    [facility]  
   
                                        Comment on above:   Performed By: #### C  
MPX ####  
[facility]  
[address] [place] [address]  
[phone]  
: John Ahumada MD   
   
                      Alkaline Phos 362 U/L    High            [facility]  
   
                                        Comment on above:   Performed By: #### C  
MPX ####  
[facility]  
[address] [place] [address]  
[phone]  
: John Ahumada MD   
   
                                                    ALT [Catalytic   
activity/Vol]   98 U/L          High            5-41            [facility]  
   
                                        Comment on above:   Performed By: #### C  
MPX ####  
[address] [address]  
[phone]  
: John Ahumada MD   
   
                                                    Anion gap   
[Moles/Vol]     12 mmol/L       Normal          9-17            [facility]  
   
                                        Comment on above:   Performed By: #### C  
MPX ####  
[address] [address]  
[phone]  
: John Ahumada MD   
   
                                                    AST [Catalytic   
activity/Vol]   80 U/L          High            <40             [facility]  
   
                                        Comment on above:   Performed By: #### C  
MPX ####  
[address] [address]  
[phone]  
: John Ahumada MD   
   
                                                    Bilirubin   
[Mass/Vol]      6.8 mg/dL       High            0.3-1.2         [facility]  
   
                                        Comment on above:   Performed By: #### C  
MPX ####  
[address] [address]  
[phone]  
: John Ahumada MD   
   
                      Calcium [Mass/Vol] 8.9 mg/dL  Normal     8.6-10.4   [facility]  
   
                                        Comment on above:   Performed By: #### C  
MPX ####  
[address] [address]  
[phone]  
: John Ahumada MD   
   
                                                    Chloride   
[Moles/Vol]     102 mmol/L      Normal                    [facility]  
   
                                        Comment on above:   Performed By: #### C  
MPX ####  
[address] [address]  
[phone]  
: John Ahumada MD   
   
                      CO2 [Moles/Vol] 20 mmol/L  Normal     20-31      [facility]  
   
                                        Comment on above:   Performed By: #### C  
MPX ####  
[address] [address]  
[phone]  
: John Ahumada MD   
   
                      Glucose [Mass/Vol] 85 mg/dL   Normal     70-99      [facility]  
   
                                        Comment on above:   Performed By: #### C  
MPX ####  
[address] [address]  
[phone]  
: John Ahumada MD   
   
                                                    Potassium   
[Moles/Vol]     3.6 mmol/L      Low             3.7-5.3         [facility]  
   
                                        Comment on above:   Performed By: #### C  
MPX ####  
[address] [address]  
[phone]  
: John Ahumada MD   
   
                      Protein [Mass/Vol] 6.0 g/dL   Low        6.4-8.3    [facility]  
   
                                        Comment on above:   Performed By: #### C  
MPX ####  
[address] [address]  
[phone]  
: John Ahumada MD   
   
                      Sodium [Moles/Vol] 134 mmol/L Low        135-144    [facility]  
   
                                        Comment on above:   Performed By: #### C  
MPX ####  
[address] [address]  
[phone]  
: John Ahumada MD   
   
                                                    Urea nitrogen   
[Mass/Vol]      9 mg/dL         Normal          8-23            [facility]  
   
                                        Comment on above:   Performed By: #### C  
MPX ####  
[address] [address]  
[phone]  
: John Ahumada MD   
   
                                                    Ammoniaon 2023   
   
                                                    Ammonia (P)   
[Moles/Vol]     27 umol/L       Normal          16-60           [facility]  
   
                                        Comment on above:   Performed By: #### B  
C ####  
[address] [address]  
[phone]  
: John Ahumada MD   
   
                                                            Performed By: #### C  
DP, REJEC ####  
[address] [place] [address]  
[phone]  
: John Ahumada MD   
   
                                                    Basic Metabolic Profon    
   
                                                    Creatinine   
[Mass/Vol]      0.6 mg/dL       Low             0.7-1.2         [facility]  
   
                                        Comment on above:   Result Comment: ICTE  
[facility]   
   
                                                            Performed By: #### B  
C ####  
[facility]  
[address] [place] [address]  
[phone]  
: John Ahumada MD   
   
                                                    GFR/1.73 sq   
M.predicted among   
non-blacks MDRD   
(S/P/Bld) [Vol   
rate/Area]      mL/min/{1.73_m2} Normal          >60             [facility]  
   
                                        Comment on above:   Result Comment:  
These results are not intended for use in patients <18 years of   
age.  
eGFR results are calculated without a race factor using the    
CKD-EPI  
equation.  
Careful clinical correlation is recommended, particularly when   
comparing to  
results calculated using previous equations.  
The CKD-EPI equation is less accurate in patients with extremes of   
muscle mass,  
extra-renal metabolism of creatine, excessive creatine ingestion,   
or following  
therapy that affects renal tubular secretion.   
   
                                                            Performed By: #### B  
C ####  
[facility]  
[address] [place] [address]  
[phone]  
: John Ahumada MD   
   
                                                    Anion gap   
[Moles/Vol]     10 mmol/L       Normal          9-17            [facility]  
   
                                        Comment on above:   Performed By: #### B  
C ####  
[facility]  
[address] [place] [address]  
[phone]  
: John Ahumada MD   
   
                      Calcium [Mass/Vol] 8.7 mg/dL  Normal     8.6-10.4   [facility]  
   
                                        Comment on above:   Performed By: #### B  
C ####  
[facility]  
[address] [place] [address]  
[phone]  
: John Ahumada MD   
   
                                                    Chloride   
[Moles/Vol]     102 mmol/L      Normal                    [facility]  
   
                                        Comment on above:   Performed By: #### B  
C ####  
[address] [address]  
[phone]  
: John Ahumada MD   
   
                      CO2 [Moles/Vol] 24 mmol/L  Normal     20-31      [facility]  
   
                                        Comment on above:   Performed By: #### B  
C ####  
[address] [address]  
[phone]  
: John Ahumada MD   
   
                      Glucose [Mass/Vol] 82 mg/dL   Normal     70-99      [facility]  
   
                                        Comment on above:   Performed By: #### B  
C ####  
[address] [address]  
[phone]  
: John Ahumada MD   
   
                                                    Potassium   
[Moles/Vol]     3.7 mmol/L      Normal          3.7-5.3         [facility]  
   
                                        Comment on above:   Performed By: #### B  
C ####  
[address] [address]  
[phone]  
: John Ahumada MD   
   
                      Sodium [Moles/Vol] 136 mmol/L Normal     135-144    [facility]  
   
                                        Comment on above:   Performed By: #### B  
C ####  
[address] [address]  
[phone]  
: John Ahumada MD   
   
                                                    Urea nitrogen   
[Mass/Vol]      9 mg/dL         Normal          8-23            [facility]  
   
                                        Comment on above:   Performed By: #### B  
C ####  
[address] [address]  
[phone]  
: John Ahumada MD   
   
                                                    CA 19-9on 2023   
   
                      CA 19-9    650 U/mL   High       0-35       [facility]  
   
                                        Comment on above:   Result Comment: The   
Roche  ECLIA  assay is used. Results   
obtained   
with different assay methods  
cannot be used interchangeably.   
   
                                                            Performed By: #### C  
DP, REJEC ####  
[address] [address]  
[phone]  
: John Ahumada MD   
   
                                                            Performed By: #### A  
NCACP, AMAB ####  
[address] [address]  
[phone]  
: John Ahumada MD   
   
                                                    CBC with Diffon 2023   
   
                      Abs. Basophil 0.06 k/uL  Normal     0.00-0.20  [facility]  
   
                                        Comment on above:   Performed By: #### B  
C ####  
[address] [address]  
[phone]  
: John Ahumada MD   
   
                      Abs.Imm.Granulocyte 0.07 k/uL  Normal     0.00-0.30  [facility]  
   
                                        Comment on above:   Performed By: #### B  
C ####  
[address] [address]  
[phone]  
: John Ahumada MD   
   
                                                    Abs.Neutrophil   
(Seg)           3.78 k/uL       Normal          1.50-8.10       [facility]  
   
                                        Comment on above:   Performed By: #### B  
C ####  
[address] [address]  
[phone]  
: John Ahumada MD   
   
                                                    Basophils/100 WBC   
(Bld)           1 %             Normal          0-2             [facility]  
   
                                        Comment on above:   Performed By: #### B  
C ####  
[address] [address]  
[phone]  
: John Ahumada MD   
   
                                                    Eosinophils (Bld)   
[#/Vol]         0.21 10*3/uL    Normal          0.00-0.44       [facility]  
   
                                        Comment on above:   Performed By: #### B  
C ####  
[address] [address]  
[phone]  
: John Ahumada MD   
   
                                                    Eosinophils/100 WBC   
(Bld)           3 %             Normal          1-4             [facility]  
   
                                        Comment on above:   Performed By: #### B  
C ####  
[address] [address]  
[phone]  
: John Ahumada MD   
   
                                                    Erythrocyte   
distribution width   
(RBC) [Ratio]   17.1 %          High            11.8-14.4       [facility]  
   
                                        Comment on above:   Performed By: #### B  
C ####  
[address] [address]  
[phone]  
: John Ahumaad MD   
   
                                                    Hematocrit (Bld)   
[Volume fraction] 42.7 %          Normal          40.7-50.3       [facility]  
   
                                        Comment on above:   Performed By: #### B  
C ####  
[address] [address]  
[phone]  
: John Ahumada MD   
   
                                                    Hemoglobin (Bld)   
[Mass/Vol]      14.2 g/dL       Normal          13.0-17.0       [facility]  
   
                                        Comment on above:   Performed By: #### B  
C ####  
[address] [address]  
[phone]  
: John Ahumada MD   
   
                                                    Immature   
granulocytes/100   
WBC (Bld)       1 %             High            0               [facility]  
   
                                        Comment on above:   Performed By: #### B  
C ####  
[address] [address]  
[phone]  
: John Ahumada MD   
   
                                                    Lymphocytes (Bld)   
[#/Vol]         1.58 10*3/uL    Normal          1.10-3.70       [facility]  
   
                                        Comment on above:   Performed By: #### B  
C ####  
[address] [address]  
[phone]  
: John Ahumada MD   
   
                                                    Lymphocytes/100 WBC   
(Bld)           25 %            Normal          24-43           [facility]  
   
                                        Comment on above:   Performed By: #### B  
C ####  
[address] [address]  
[phone]  
: John Ahumada MD   
   
                                                    MCH (RBC) [Entitic   
mass]           30.0 pg         Normal          25.2-33.5       [facility]  
   
                                        Comment on above:   Performed By: #### B  
C ####  
[address] [address]  
[phone]  
: John Ahumada MD   
   
                                                    MCHC (RBC)   
[Mass/Vol]      33.3 g/dL       Normal          28.4-34.8       [facility]  
   
                                        Comment on above:   Performed By: #### B  
C ####  
[address] [address]  
[phone]  
: John Ahumada MD   
   
                                                    MCV (RBC) [Entitic   
vol]            90.1 fL         Normal          82.6-102.9      [facility]  
   
                                        Comment on above:   Performed By: #### B  
C ####  
[address] [address]  
[phone]  
: John Ahumada MD   
   
                                                    Monocytes (Bld)   
[#/Vol]         0.54 10*3/uL    Normal          0.10-1.20       [facility]  
   
                                        Comment on above:   Performed By: #### B  
C ####  
[address] [address]  
[phone]  
: John Ahumada MD   
   
                                                    Monocytes/100 WBC   
(Bld)           9 %             Normal          3-12            [facility]  
   
                                        Comment on above:   Performed By: #### B  
C ####  
[address] [address]  
[phone]  
: John Ahumada MD   
   
                      Neutrophil (Seg) 61 %       Normal     36-65      [facility]  
   
                                        Comment on above:   Performed By: #### B  
C ####  
[address] [address]  
[phone]  
: John Ahumada MD   
   
                      NRBC Automated 0.0 per 100 WBC Normal     0.0        [facility]  
   
                                        Comment on above:   Performed By: #### B  
C ####  
[address] [address]  
[phone]  
: John Ahumada MD   
   
                                                    Platelet mean   
volume (Bld)   
[Entitic vol]   11.5 fL         Normal          8.1-13.5        [facility]  
   
                                        Comment on above:   Performed By: #### B  
C ####  
[address] [address]  
[phone]  
: John Ahumada MD   
   
                                                    Platelets (Bld)   
[#/Vol]         254 10*3/uL     Normal          138-453         [facility]  
   
                                        Comment on above:   Performed By: #### B  
C ####  
[address] [address]  
[phone]  
: John Ahumada MD   
   
                      RBC (Bld) [#/Vol] 4.74 10*6/uL Normal     4.21-5.77  [facility]  
   
                                        Comment on above:   Performed By: #### B  
C ####  
[address] [address]  
[phone]  
: John Ahumada MD   
   
                                                    RBC morphology   
finding Nom (Bld) ANISOCYTOSIS PRESENT Normal                          [facility]  
   
                                        Comment on above:   Performed By: #### B  
C ####  
[address] [address]  
[phone]  
: John Ahumada MD   
   
                      WBC (Bld) [#/Vol] 6.2 10*3/uL Normal     3.5-11.3   [facility]  
   
                                        Comment on above:   Performed By: #### B  
C ####  
[address] [address]  
[phone]  
: John Ahumada MD   
   
                                                    Calcium, Ionicon 2023   
   
                      Calcium [Moles/Vol] 1.18 mmol/L Normal     1.13-1.33  [facility]  
   
                                        Comment on above:   Performed By: #### B  
C ####  
[address] [address]  
[phone]  
: John Ahumada MD   
   
                                                            Performed By: #### C  
DP, REJEC ####  
[address]  
[place] [address]  
[phone]  
: John Ahumada MD   
   
                                                    Carcinoembry. Antig.on    
   
                                                    Carcinoembry.   
Antig.          1.6 ng/mL       Normal          <3.9            [facility]  
   
                                        Comment on above:   Result Comment: The   
Roche  ECLIA  assay is used. Results   
obtained   
with different assay methods  
cannot be used interchangeably.   
   
                                                            Performed By: #### C  
DP, REJEC ####  
[address] [address]  
[phone]  
: John Ahumada MD   
   
                                                            Performed By: #### A  
ELIZABETH, AMAB ####  
[address] [address]  
[phone]  
: John Ahumada MD   
   
                                                    Extra Yellow Tubeon 20  
23   
   
                      Extra Yellow Tube            Normal                [facility]  
   
                                        Comment on above:   Performed By: #### B  
C ####  
[facility]  
[address] [place] [address]  
[phone]  
: John Ahumada MD   
   
                                                            Performed By: #### C  
ODNA, REJEC ####  
[facility]  
[address] [place] [address]  
[phone]  
: John Ahumada MD   
   
                                                    IgMon 2023   
   
                      IgM [Mass/Vol] 82 mg/dL   Normal          [facility]  
   
                                        Comment on above:   Performed By: #### C  
DP, REJEC ####  
[address] [address]  
[phone]  
: John Ahumada MD   
   
                                                            Performed By: #### A  
ELIZABETH, AMAB ####  
[facility] [place]  
[address] [place] [address]  
[phone]  
: John Ahumada MD   
   
                                                    Liver Profileon 2023   
   
                      Albumin [Mass/Vol] 3.3 g/dL   Low        3.5-5.2    [facility]  
   
                                        Comment on above:   Performed By: #### B  
C ####  
[address] [address]  
[phone]  
: John Ahumada MD   
   
                      Albumin/Glob Ratio 1.2        Normal     1.0-2.5    [facility]  
   
                                        Comment on above:   Performed By: #### B  
C ####  
[address] [address]  
[phone]  
: John Ahumada MD   
   
                      Alkaline Phos 363 U/L    High            [facility]  
   
                                        Comment on above:   Performed By: #### B  
C ####  
[address] [address]  
[phone]  
: John Ahumada MD   
   
                                                    ALT [Catalytic   
activity/Vol]   100 U/L         High            5-41            [facility]  
   
                                        Comment on above:   Performed By: #### B  
C ####  
[address] [address]  
[phone]  
: John Ahumada MD   
   
                                                    AST [Catalytic   
activity/Vol]   70 U/L          High            <40             [facility]  
   
                                        Comment on above:   Performed By: #### B  
C ####  
[address] [address]  
[phone]  
: John Ahumada MD   
   
                                                    Bilirubin   
[Mass/Vol]      7.5 mg/dL       High            0.3-1.2         [facility]  
   
                                        Comment on above:   Performed By: #### B  
C ####  
[address] [address]  
[phone]  
: John Ahumada MD   
   
                      Bilirubin, Indirect 2.6 mg/dL  High       0.0-1.0    [facility]  
   
                                        Comment on above:   Performed By: #### B  
C ####  
[address] [address]  
[phone]  
: John Ahumada MD   
   
                                                    Bilirubin.indirect   
[Mass/Vol]      4.9 mg/dL       High            <0.3            [facility]  
   
                                        Comment on above:   Performed By: #### B  
C ####  
[address] [address]  
[phone]  
: John Ahumada MD   
   
                      Protein [Mass/Vol] 6.0 g/dL   Low        6.4-8.3    [facility]  
   
                                        Comment on above:   Performed By: #### B  
C ####  
[address] [address]  
[phone]  
: John Ahumada MD   
   
                                                    RA Screenon 2023   
   
                      RA Screen  <10        Normal     <14        [facility]  
   
                                        Comment on above:   Performed By: #### C  
DP, REJEC ####  
[address] [address]  
[phone]  
: John Ahumada MD   
   
                                                            Performed By: #### A  
NCACP, AMAB ####  
[address] [address]  
[phone]  
: John Ahumada MD   
   
                                                    Calcium, Ionicon 2023   
   
                      Calcium [Moles/Vol] 1.12 mmol/L Low        1.13-1.33  [facility]  
   
                                        Comment on above:   Performed By: #### I  
OCAL, LIP, MG, CHARANJIT, TRIG ####  
[address] [address]  
[phone]  
: John Ahumada MD   
   
                                                            Performed By: #### P  
HO, LIP, IOCAL, MG, TRIG ####  
[address] [address]  
[phone]  
: John Ahumada MD   
   
                                                    Comp Metab w/Bili Pron    
   
                                                    Creatinine   
[Mass/Vol]      0.6 mg/dL       Low             0.7-1.2         [facility]  
   
                                        Comment on above:   Result Comment: ICTE  
[facility]   
   
                                                            Performed By: #### C  
MPX ####  
[address] [address]  
[phone]  
: John Ahumada MD   
   
                                                            Performed By: #### B  
C ####  
[facility] [place]  
[address] [place] [address]  
[phone]  
: John Ahumada MD   
   
                                                    GFR/1.73 sq   
M.predicted among   
non-blacks MDRD   
(S/P/Bld) [Vol   
rate/Area]      mL/min/{1.73_m2} Normal          >60             [facility]  
   
                                        Comment on above:   Result Comment:  
These results are not intended for use in patients <18 years of   
age.  
eGFR results are calculated without a race factor using the    
CKD-EPI  
equation.  
Careful clinical correlation is recommended, particularly when   
comparing to  
results calculated using previous equations.  
The CKD-EPI equation is less accurate in patients with extremes of   
muscle mass,  
extra-renal metabolism of creatine, excessive creatine ingestion,   
or following  
therapy that affects renal tubular secretion.   
   
                                                            Performed By: #### C  
MPX ####  
[facility] [place]  
[address] [place] [address]  
[phone]  
: John Ahumada MD   
   
                                                            Performed By: #### B  
C ####  
[facility]  
[address] [place] [address]  
[phone]  
: John Ahumada MD   
   
                      Albumin [Mass/Vol] 3.0 g/dL   Low        3.5-5.2    [facility]  
   
                                        Comment on above:   Performed By: #### C  
MPX ####  
[facility]  
[address] [place] [address]  
[phone]  
: John Ahumada MD   
   
                                                            Performed By: #### B  
C ####  
[facility]  
[address] [place] [address]  
[phone]  
: John Ahumada MD   
   
                      Albumin/Glob Ratio 1.0        Normal     1.0-2.5    [facility]  
   
                                        Comment on above:   Performed By: #### C  
MPX ####  
[facility] [place]  
[address] [place] [address]  
[phone]  
: John Ahumada MD   
   
                                                            Performed By: #### B  
C ####  
[facility]  
[address] [place] [address]  
[phone]  
: John Ahumada MD   
   
                      Alkaline Phos 358 U/L    High            [facility]  
   
                                        Comment on above:   Performed By: #### C  
MPX ####  
[address] [address]  
[phone]  
: John Ahumada MD   
   
                                                            Performed By: #### B  
C ####  
[address] [address]  
[phone]  
: John Ahumada MD   
   
                                                    ALT [Catalytic   
activity/Vol]   107 U/L         High            5-41            [facility]  
   
                                        Comment on above:   Performed By: #### C  
MPX ####  
[address] [address]  
[phone]  
: John Ahumada MD   
   
                                                            Performed By: #### B  
C ####  
[address] [address]  
[phone]  
: John Ahumada MD   
   
                                                    Anion gap   
[Moles/Vol]     10 mmol/L       Normal          9-17            [facility]  
   
                                        Comment on above:   Performed By: #### C  
MPX ####  
[address] [address]  
[phone]  
: John Ahumada MD   
   
                                                            Performed By: #### B  
C ####  
[address] [address]  
[phone]  
: John Ahumada MD   
   
                                                    AST [Catalytic   
activity/Vol]   76 U/L          High            <40             [facility]  
   
                                        Comment on above:   Performed By: #### C  
MPX ####  
[address] [address]  
[phone]  
: John Ahumada MD   
   
                                                            Performed By: #### B  
C ####  
[address] [address]  
[phone]  
: John Ahumada MD   
   
                                                    Bilirubin   
[Mass/Vol]      8.5 mg/dL       High            0.3-1.2         [facility]  
   
                                        Comment on above:   Performed By: #### C  
MPX ####  
[address] [address]  
[phone]  
: John Ahumada MD   
   
                                                            Performed By: #### B  
C ####  
[address] [address]  
[phone]  
: John Ahumada MD   
   
                      Bilirubin, Indirect 2.9 mg/dL  High       0.0-1.0    [facility]  
   
                                        Comment on above:   Performed By: #### C  
MPX ####  
[address] [address]  
[phone]  
: John Ahumada MD   
   
                                                            Performed By: #### B  
C ####  
[address] [address]  
[phone]  
: John Ahumada MD   
   
                                                    Bilirubin.indirect   
[Mass/Vol]      5.6 mg/dL       High            <0.3            [facility]  
   
                                        Comment on above:   Performed By: #### C  
MPX ####  
[address] [address]  
[phone]  
: John Ahumada MD   
   
                                                            Performed By: #### B  
C ####  
[address] [address]  
[phone]  
: John Ahumada MD   
   
                      Calcium [Mass/Vol] 8.4 mg/dL  Low        8.6-10.4   [facility]  
   
                                        Comment on above:   Performed By: #### C  
MPX ####  
[address] [address]  
[phone]  
: John Ahumada MD   
   
                                                            Performed By: #### B  
C ####  
[address] [address]  
[phone]  
: John Ahumada MD   
   
                                                    Chloride   
[Moles/Vol]     102 mmol/L      Normal                    [facility]  
   
                                        Comment on above:   Performed By: #### C  
MPX ####  
[address] [address]  
[phone]  
: John Ahumada MD   
   
                                                            Performed By: #### B  
C ####  
[address] [address]  
[phone]  
: John Ahumada MD   
   
                      CO2 [Moles/Vol] 22 mmol/L  Normal     20-31      [facility]  
   
                                        Comment on above:   Performed By: #### C  
MPX ####  
[address] [address]  
[phone]  
: John Ahumada MD   
   
                                                            Performed By: #### B  
C ####  
[address] [address]  
[phone]  
: John Ahumada MD   
   
                      Glucose [Mass/Vol] 95 mg/dL   Normal     70-99      [facility]  
   
                                        Comment on above:   Performed By: #### C  
MPX ####  
[address] [address]  
[phone]  
: John Ahumada MD   
   
                                                            Performed By: #### B  
C ####  
[address] [address]  
[phone]  
: John Ahumada MD   
   
                                                    Potassium   
[Moles/Vol]     3.3 mmol/L      Low             3.7-5.3         [facility]  
   
                                        Comment on above:   Performed By: #### C  
MPX ####  
[address] [address]  
[phone]  
: John Ahumada MD   
   
                                                            Performed By: #### B  
C ####  
[address] [address]  
[phone]  
: John Ahumada MD   
   
                      Protein [Mass/Vol] 5.9 g/dL   Low        6.4-8.3    [facility]  
   
                                        Comment on above:   Performed By: #### C  
MPX ####  
[address] [place] [address]  
[phone]  
: John Ahumaad MD   
   
                                                            Performed By: #### B  
C ####  
[address] [address]  
[phone]  
: John Ahumada MD   
   
                      Sodium [Moles/Vol] 134 mmol/L Low        135-144    [facility]  
   
                                        Comment on above:   Performed By: #### C  
MPX ####  
[address] [address]  
[phone]  
: John Ahumada MD   
   
                                                            Performed By: #### B  
C ####  
[address] [address]  
[phone]  
: John Ahumdaa MD   
   
                                                    Urea nitrogen   
[Mass/Vol]      8 mg/dL         Normal          8-23            [facility]  
   
                                        Comment on above:   Performed By: #### C  
MPX ####  
[address] [address]  
[phone]  
: John Ahumada MD   
   
                                                            Performed By: #### B  
C ####  
[address] [address]  
[phone]  
: John Ahumada MD   
   
                                                    Lipaseon 2023   
   
                                                    Lipase [Catalytic   
activity/Vol]   48 U/L          Normal          13-60           [facility]  
   
                                        Comment on above:   Performed By: #### I  
OCAL, LIP, MG, CHARANJIT, TRIG ####  
[address] [address]  
[phone]  
: John Ahumada MD   
   
                                                            Performed By: #### P  
HO, LIP, IOCAL, MG, TRIG ####  
[address] [address]  
[phone]  
: John Ahumada MD   
   
                                                    Magnesiumon 2023   
   
                                                    Magnesium   
[Mass/Vol]      2.3 mg/dL       Normal          1.6-2.6         [facility]  
   
                                        Comment on above:   Performed By: #### I  
OCAL, LIP, MG, CHARANJIT, TRIG ####  
[facility] [place]  
[address] [place] [address]  
[phone]  
: John Ahumada MD   
   
                                                            Performed By: #### P  
HO, LIP, IOCAL, MG, TRIG ####  
[facility] [place]  
[address] [place] [address]  
[phone]  
: John Ahumada MD   
   
                                                    PTon 2023   
   
                                                    INR Coag (PPP)   
[Relative time] 0.9 {INR}       Normal                          [facility]  
   
                                        Comment on above:   Result Comment:  
Therapeutic Range:  
Moderate Anticoagulant Intensity: INR = 2.0-3.0  
High Anticoagulant Intensity: INR = 2.5-3.5   
   
                                                            Performed By: #### C  
DP, REJEC ####  
[facility] [place]  
[address] [place] [address]  
[phone]  
: John Ahumada MD   
   
                                                    PT Coag (PPP)   
[Time]          11.8 s          Normal          11.7-14.9       [facility]  
   
                                        Comment on above:   Performed By: #### C  
DP, REJEC ####  
[facility] [place]  
[address] [place] [address]  
[phone]  
: John Ahumada MD   
   
                                                    Phosphorus, Inorg.on   
023   
   
                      Phosphorus, Inorg. 2.6 mg/dL  Normal     2.5-4.5    [facility]  
   
                                        Comment on above:   Performed By: #### I  
OCAL, LIP, MG, CHARANJIT, TRIG ####  
[facility]  
[address] [place] [address]  
[phone]  
: John Ahumada MD   
   
                                                            Performed By: #### P  
HO, LIP, IOCAL, MG, TRIG ####  
[facility] [place]  
[address] [place] [address]  
[phone]  
: John Ahumada MD   
   
                                                    Triglycerideson 2023   
   
                                                    Triglyceride   
[Mass/Vol]      138 mg/dL       Normal          <150            [facility]  
   
                                        Comment on above:   Result Comment:  
Triglyceride Guidelines:  
<150 Desirable  
150-199 Borderline  
200-499 High  
>499 Very high  
Based on AHA Guidelines for fasting triglyceride, 2012.   
   
                                                            Performed By: #### I  
OCAL, LIP, MG, CHARANJIT, TRIG ####  
[facility]  
[address] [place], [place] [address] [phone]  
: John Ahumada MD   
   
                                                            Performed By: #### A  
NCACP, AMAB ####  
[facility]  
[address] [place], [place] [id number] [phone]  
: John Ahumada MD   
   
                                                    Ambulatory Visit Summaryon 1  
2023   
   
                                                    Ambulatory Visit   
Summary                                 [Image Removed]  
TAWANDA VILLARREAL  
:1955  
MRN:[id number]  
Visit Date:2023  
Ambulatory Visit   
Instructions  
Your Diagnosis  
Jaundice  
Your Care Team  
Attending Physician -  
Schwab FNP, Jodi L  
Primary Care Physician   
-  
Baldomero WAGNER, Live CHAN  
This Is Your   
Medications List  
cetirizine (cetirizine   
10 mg oral capsule)  
donepezil (donepezil   
10 mg Tab)  
memantine (memantine 5   
mg Tab)  
Procedures Performed  
Shunt ().  
What to do next  
Scheduled Follow-Up   
Appointments  
Tuesday Dec. 5, 2023   
2:40 PM EST  
Where: [facility]  
   
                                                    Family Medicine Office/Clini  
c Noteon 2023   
   
                                                    Family Medicine   
Office/Clinic Note                      HPI Staff  
Tawanda is a 67 year   
old male presenting   
for acute visit  
Pt Jaundice  
Assessment/Plan  
1. Jaundice (R17:   
Unspecified jaundice)  
pt presents for office   
visit pt is visibly   
severely jaundice. pt   
caregiver was advised   
to take him to ER. pt   
was not seen for a   
visit. just sent to ER  
Follow-up  
No qualifying data   
available  
Problem List/Past   
Medical History  
Ongoing  
Allergies  
Fatigue  
Head injury  
Jaundice  
Nocturia  
Screening for   
hyperlipidemia  
Screening for prostate   
cancer  
Sleep disorder  
Wellness examination  
Historical  
No qualifying data  
Procedure/Surgical   
History  
Shunt ().  
Medications  
cetirizine 10 mg oral   
capsule, 10 mg= 1   
cap(s), Oral, Daily,   
PRN, 2 refills  
donepezil 10 mg Tab,   
10 mg= 1 tab(s), Oral,   
Once a day (at   
bedtime), 2 refills  
memantine 5 mg Tab, 5   
mg= 1 tab(s), Oral,   
Daily, 2 refills  
Allergies  
No Known Medication   
Allergies  
Social History  
Tobacco  
Former smoker, quit   
more than 30 days ago   
Tobacco Use:.   
Cigarettes, Household   
tobacco concerns: No.,   
2023  
Immunizations  
Vaccine Date Status  
diphtheria/pertussis,   
acel/tetanus adult   
2020 Recorded  
influenza virus   
vaccine, inactivated   
2017 Recorded  
influenza virus   
vaccine, inactivated   
10/23/2015 Recorded Normal                                  [facility]  
   
                                        Comment on above:   Result Comment: Elec  
tronically Signed By: Schwab FNP, Jodi L\.br\Date and Time Signed: 23 16:13 EST   
   
                                                    Reminderson 10-   
   
                                        Reminders           --------------------  
-  
From: Schwab FNP, Jodi L  
To: [facility] - Clinical;  
Sent: 10/30/2023   
13:43:18 EDT Show up:   
10/30/2023 13:43:00   
EDT  
Subject: Ambulatory   
Reminder  
Due Date/Time:   
10/31/2023 13:42:00   
EDT  
Let Tawanda's caregiver   
know his PSA was just   
slightly elevated. I   
would like to repeat   
that in 6-8 weeks. IF   
it is still elevated   
then, I will refer to   
urology. Otherwise his   
labs are all normal  
Results:  
Date Result Name Ind   
Value Ref Range  
10/27/2023 9:13 WBC   
8.3 E9/L (4.0 - 11.0)  
10/27/2023 9:13 RBC   
5.1 E12/L (4.3 - 5.9)  
10/27/2023 9:13 HGB   
15.5 gm/dL (13.5 -   
17.5)  
10/27/2023 9:13 Hct   
45.8 % (37.7 - 49.0)  
10/27/2023 9:13 MCV   
89.6 fL (80.0 - 100.0)  
10/27/2023 9:13 MCH   
30.3 pg (27.0 - 34.0)  
10/27/2023 9:13 MCHC   
33.9 gm/dL (31.4 -   
36.0)  
10/27/2023 9:13 RDW   
((H)) 14.4 % (10.9 -   
14.2)  
10/27/2023 9:13   
Platelet 339.0 E9/L   
(150.0 - 500.0)  
10/27/2023 9:13 MPV   
8.2 fL (6.4 - 10.8)  
10/27/2023 9:13 Neutro   
Auto 66.3 % (36.0 -   
75.0)  
10/27/2023 9:13 Lymph   
Auto 21.4 % (14.0 -   
50.0)  
10/27/2023 9:13 Mono   
Auto 9.0 % (4.0 -   
14.0)  
10/27/2023 9:13 Eos   
Auto 3.0 % (0.0 - 8.0)  
10/27/2023 9:13   
Basophil Auto 0.3 %   
(0.0 - 2.0)  
10/27/2023 9:13 Neutro   
Absolute 5.5 E9/L (2.0   
- 7.5)  
10/27/2023 9:13 Lymph   
Absolute 1.8 E9/L (1.0   
- 4.0)  
10/27/2023 9:13 [place]   
Absolute 0.7 E9/L (0.2   
- 1.0)  
10/27/2023 9:13 Eos   
Absolute 0.3 E9/L (0.0   
- 0.5)  
10/27/2023 9:13   
Basophil Absolute 0.0   
E9/L (0.0 - 0.2)  
10/27/2023 9:13   
Glucose Lvl 86 mg/dL   
(55 - 199)  
10/27/2023 9:13 BUN 13   
mg/dL (5 - 21)  
10/27/2023 9:13   
Creatinine 0.9 mg/dL   
(0.5 - 1.3)  
10/27/2023 9:13 eGFR   
95 mL/min/1.73 m2   
(>=59 - )  
10/27/2023 9:13   
BUN/Creat Ratio 14 (10   
- 20)  
10/27/2023 9:13 Sodium   
Lvl 137 mmol/L (135 -   
145)  
10/27/2023 9:13   
Potassium Lvl 3.8   
mmol/L (3.5 - 5.3)  
10/27/2023 9:13   
Chloride 108 mmol/L   
(101 - 111)  
10/27/2023 9:13 CO2 22   
mmol/L (21 - 31)  
10/27/2023 9:13 AGAP   
11 mEq/L (6 - 16)  
10/27/2023 9:13   
Calcium Lvl 9.1 mg/dL   
(8.9 - 11.1)  
10/27/2023 9:13 Alk   
Phos 65 Int._Unit/L   
(21 - 98)  
10/27/2023 9:13 ALT 21   
Int._Unit/L (6 - 46)  
10/27/2023 9:13 AST 20   
Int._Unit/L (5 - 43)  
10/27/2023 9:13 Total   
Protein 7.5 gm/dL (6.0   
- 7.8)  
10/27/2023 9:13   
Albumin Lvl 3.5 gm/dL   
(3.3 - 5.0)  
10/27/2023 9:13   
Globulin 4.0 gm/dL   
(1.4 - 4.0)  
10/27/2023 9:13 A/G   
Ratio ((L)) 0.9 (1.1 -   
2.2)  
10/27/2023 9:13 Bili   
Total 0.6 mg/dL (0.0 -   
1.1)  
10/27/2023 9:13 Chol   
157 mg/dL (120 - 200)  
10/27/2023 9:13 Trig   
63 mg/dL ( - <=149)  
10/27/2023 9:13 HDL 40   
mg/dL  
10/27/2023 9:13 LDL   
Direct 99 mg/dL ( -   
<=129)  
10/27/2023 9:13 VLDL   
13 mg/dL (7 - 40)  
10/27/2023 9:13 TSH   
2.09 mcIU/mL (0.34 -   
5.60)  
10/27/2023 9:13 PSA   
Scrn Tot. ((H)) 4.1   
ng/mL (0.1 - 3.5)  
spoke with caregiver   
Emily and advised her   
PSA was elevated and   
autumn would like to re   
check the levels in   
6-8 weeks if still   
elevated will refer to   
Urology. Caregiver   
transferred to front   
desk to schedule lab   
draw.               Normal                                  [facility]  
   
                                                    Ambulatory Visit Summaryon 1  
   
   
                                                    Ambulatory Visit   
Summary                                 [Image Removed]  
TAWANDA VILLARREAL  
:1955  
MRN:[id number]  
Visit Date:10/27/2023  
Ambulatory Visit   
Instructions  
Your Diagnosis  
Wellness examination  
Screening for   
hyperlipidemia  
Screening for prostate   
cancer  
Fatigue  
Nocturia  
Allergies  
Brain damage  
Head injury  
BMI 26.0-26.9,adult  
Former smoker  
Your Care Team  
Attending Physician -  
Schwab FNP, Jodi L  
Primary Care Physician   
-  
Live Alexandre MD  
This Is Your   
Medications List  
cetirizine (cetirizine   
10 mg oral capsule)  
donepezil (donepezil   
10 mg Tab)  
memantine (memantine 5   
mg Tab)  
Procedures Performed  
Shunt ().  
Discharge Vitals  
Heart Rate   
(Peripheral)  
68  
Respiratory Rate  
18  
Blood Pressure  
132/88  
Height  
177 cm  
Height  
70 in  
Weight  
83.10 kg  
Weight  
182.82 lb  
BMI  
26.52  
What to do next  
You Need to Complete   
the Following  
CBC w/ Auto Diff,   
Blood, Routine   
collect, 10/27/23,   
Order for future   
visit, Lab Collect,   
Wellness examination                    Invalid   
Interpretation   
Code                                    Screening for   
prostate cancer                         [facility]  
   
                                                    Auto Diffon 10-   
   
                                                    Basophils/100 WBC   
(Bld)           0.3 %           Normal          0.0-2.0         [facility]  
   
                                        Comment on above:   Order Comment: Order  
 Added by Discern Expert.   
   
                                                            Performed By: #### 2  
150903, 8589735, 1660258, 3829657, 27658675,   
17561157, 2418686 ####[facility] [address]   
[place] [address]   
   
                                                    Basophils/Leukocyte  
s Auto (Bld) [Pure   
# fraction]     0.0 E9/L        Normal          0.0-0.2         [facility]  
   
                                        Comment on above:   Order Comment: Order  
 Added by Discern Expert.   
   
                                                            Performed By: #### 2  
306038, 1058862, 7629275, 7366238, 05103326,   
17836279, 9912737 ####[facility] [address]   
[place] [address]   
   
                                                    Eosinophils/100 WBC   
(Bld)           3.0 %           Normal          0.0-8.0         [facility]  
   
                                        Comment on above:   Order Comment: Order  
 Added by Discern Expert.   
   
                                                            Performed By: #### 2  
794275, 7397604, 2367301, 8491772, 24167687,   
35316942, 0244053 ####[facility] [address]   
[place] [address]   
   
                                                    Eosinophils/Leukocy  
kenneth Auto (Bld)   
[Pure # fraction] 0.3 E9/L        Normal          0.0-0.5         [facility]  
   
                                        Comment on above:   Order Comment: Order  
 Added by Discern Expert.   
   
                                                            Performed By: #### 2  
680939, 7314505, 7873113, 0741766, 12682700,   
96389266, 3203418 ####[facility] [address]   
[place] [address]   
   
                                                    Lymphocytes/100 WBC   
(Bld)           21.4 %          Normal          14.0-50.0       [facility]  
   
                                        Comment on above:   Order Comment: Order  
 Added by Discern Expert.   
   
                                                            Performed By: #### 2  
817789, 5160072, 6838445, 3675093, 23048375,   
13913539, 8057614 ####[address] [address]   
   
                                                    Lymphocytes/Leukocy  
kenneth Auto (Bld)   
[Pure # fraction] 1.8 E9/L        Normal          1.0-4.0         [facility]  
   
                                        Comment on above:   Order Comment: Order  
 Added by Discern Expert.   
   
                                                            Performed By: #### 2  
844061, 3765360, 7387730, 7204726, 42631653,   
02548868, 4128109 ####[address] [address]   
   
                                                    Monocytes/100 WBC   
(Bld)           9.0 %           Normal          4.0-14.0        [facility]  
   
                                        Comment on above:   Order Comment: Order  
 Added by Discern Expert.   
   
                                                            Performed By: #### 2  
248777, 3803327, 7104436, 9460331, 39903651,   
80051119, 3483705 ####[address]   
[place] [address]   
   
                                                    Monocytes/Leukocyte  
s Auto (Bld) [Pure   
# fraction]     0.7 E9/L        Normal          0.2-1.0         [facility]  
   
                                        Comment on above:   Order Comment: Order  
 Added by Ulysses Expert.   
   
                                                            Performed By: #### 2  
851157, 8365605, 1543051, 9784678, 19513604,   
37271159, 7991412 ####[address] [address]   
   
                                                    Neutrophils/100 WBC   
(Bld)           66.3 %          Normal          36.0-75.0       [facility]  
   
                                        Comment on above:   Order Comment: Order  
 Added by Discern Expert.   
   
                                                            Performed By: #### 2  
273384, 3884148, 6883035, 2421737, 87780034,   
50733202, 8104738 ####[facility] [address]   
[place] [address]   
   
                                                    Neutrophils/Leukocy  
kenneth Auto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 17.5   
gm/dL                                   FT   
HemeAutoSS  
   
                                                    MCH (RBC) [Entitic   
mass]           30.3 pg         Normal          27.0 - 34.0 pg  FT   
HemeAutoSS  
   
                                                    MCHC (RBC)   
[Mass/Vol]          33.9 g/dL           Normal              31.4 - 36.0   
gm/dL                                   FT   
HemeAutoSS  
   
                                                    MCV (RBC) [Entitic   
vol]            89.6 fL         Normal          80.0 - 100.0 fL FT   
HemeAutoSS  
   
                                                    Platelet mean   
volume (Bld)   
[Entitic vol]   8.2 fL          Normal          6.4 - 10.8 fL   FT   
HemeAutoSS  
   
                                                    Platelets (Bld)   
[#/Vol]             339.0 E9/L          Normal              150.0 - 500.0   
E9/L                                    [facility]   
HemeAutoSS  
   
                      RBC (Bld) [#/Vol] 5.1 E12/L  Normal     4.3 - 5.9 E12/L FT  
   
HemeAutoSS  
   
                                                    WBC corrected for   
nucl RBC Auto (Bld)   
[#/Vol]         8.3 E9/L        Normal          4.0 - 11.0 E9/L [facility]   
Heme[facility]  
   
                                                    Lipid Panelon 10-   
   
                                                    Cholesterol   
[Mass/Vol]      157 mg/dL       Normal          120-200         [facility]  
   
                                        Comment on above:   Performed By: #### 2  
[id number], 1299001, 2056104, 5196731,   
[id number],   
99452284, 7522603 ####[facility] [address]   
[place] [address]   
   
                                                    Cholesterol in HDL   
[Mass/Vol]                40 mg/dL                  Invalid   
Interpretation   
Code                                                [facility]  
   
                                        Comment on above:   Result Comment: HDL   
> or equal to 60 mg/dL: Low cardiovascular  
   
risk  
HDL < 40 mg/dL : High cardiovascular risk   
   
                                                            Performed By: #### 2  
[id number], 9970910, 7023555, 3682636, [id number],   
[id number], [id number] ####[facility] [address]   
[place] [address]   
   
                                                    Cholesterol in LDL   
[Mass/Vol]      99 mg/dL        Normal          <=129           [facility]  
   
                                        Comment on above:   Performed By: #### 2  
[id number], 6343206, 6147264, 4869997,   
51400961,   
41226660, 7477580 ####[facility] [address]   
[place] [address]   
   
                                                    Cholesterol in VLDL   
[Mass/Vol]      13 mg/dL        Normal          7-40            [facility]  
   
                                        Comment on above:   Performed By: #### 2  
911565, 4947797, 4100907, 3572981,   
18401278,   
78106663, 2390676 ####[facility] [address]   
[place] [address]   
   
                                                    Triglyceride   
[Mass/Vol]      63 mg/dL        Normal          <=149           [facility]  
   
                                        Comment on above:   Performed By: #### 2  
394328, 5592389, 8483176, 9184304,   
02006143,   
34277398, 4237201 ####[address]   
[place] [address]   
   
                                                    PSA Screen, Totalon 10-27-20  
23   
   
                                                    Prostate specific   
Ag [Mass/Vol]   4.1 ng/mL       High            0.1-3.5         [facility]  
   
                                        Comment on above:   Result Comment: The   
concentration of PSA determined by   
different   
manufacturers can vary due to differences in assay methods and   
reagent specificity. Values obtained from different assay methods   
cannot be used interchangeably. The methodology used for this   
result was chemiluminescence using [employer]   
Hy[employer] PSA reagent.   
   
                                                            Performed By: #### 2  
591267, 6513846, 6831033, 7170624, 44155010,   
77707007, 7275596 ####[facility] [address]   
[place] [address]   
   
                                                    TSHon 10-   
   
                      TSH Qn     2.09 m[IU]/L Normal     0.34-5.60  [facility]  
   
                                        Comment on above:   Performed By: #### 2  
990369, 0458440, 1490968, 1753832,   
00189359,   
06200358, 1623255 ####[address]   
[place] [address]   
   
                                                    eGFRon 10-   
   
                                                    GFR/1.73 sq   
M.predicted among   
non-blacks MDRD   
(S/P/Bld) [Vol   
rate/Area]      95 mL/min/1.73 m2 Normal          >=59            [facility]  
   
                                        Comment on above:   Order Comment: Order  
 added by Discern Expert.   
   
                                                            Result Comment:   
mari kidney disease could be indicated at   
eGFR's of less than 60 mL/min/1.73m2. Kidney failure is indicated   
at less than 15 mL/min/1.73m2.   
   
                                                            Performed By: #### 2  
660934, 7513702, 4739284, 4798186, 92279056,   
36622988, 3781574 ####[facility] [address]   
[place] [address]   
   
                                                    Formson 2023   
   
                                        Forms               [id number]  
03  
[id number]#[id number]  
CD:127              Normal                                  [facility]  
   
                                        Forms               [id number]  
90  
[id number]  
0CD:127             Normal                                  [facility]  
   
                                                    CBC AUTO DIFFon 2022   
   
                      BASO #     0.0 103/ul Normal     0.0-0.1    [facility]  
   
                                        Comment on above:   Performed By: #### C  
BC ####  
[facility]  
[address] [place] [address]  
Dr. Emani Petty   
   
                                                    Basophils/100 WBC   
(Bld)           0.6 %           Normal          0.2-2.0         [facility]  
   
                                        Comment on above:   Performed By: #### C  
BC ####  
[facility]  
[address] [place] [address]  
Dr. Emani Petty   
   
                      EO #       0.1 103/ul Normal     0.0-0.7    [facility]  
   
                                        Comment on above:   Performed By: #### C  
BC ####  
[facility]  
[address] [place] [address]  
Dr. Emani Petty   
   
                                                    Eosinophils/100 WBC   
(Bld)           2.2 %           Normal          0.9-7.0         [facility]  
   
                                        Comment on above:   Performed By: #### C  
BC ####  
[facility]  
[address] [place] [address]  
Dr. Emani Petty   
   
                                                    Erythrocyte   
distribution width   
(RBC) [Ratio]   13.3 %          Normal          11.0-15.0       [facility]  
   
                                        Comment on above:   Performed By: #### C  
BC ####  
[facility]  
[address] [place] [address]  
Dr. Emani Petty   
   
                                                    Hematocrit (Bld)   
[Volume fraction] 50.2 %          Normal          42.0-54.0       [facility]  
   
                                        Comment on above:   Performed By: #### C  
BC ####  
[facility]  
[address] [place] [address]  
Dr. Emani Petty   
   
                                                    Hemoglobin (Bld)   
[Mass/Vol]      16.3 g/dL       Normal          14.0-18.0       [facility]  
   
                                        Comment on above:   Performed By: #### C  
BC ####  
[facility]  
[address] [place] [address]  
Dr. Emani Petty   
   
                      IG #       0.05 10e3/ul Critically high 0.00-0.03  [facility]  
   
                                        Comment on above:   Performed By: #### C  
BC ####  
[facility]  
[address] [place] [address]  
Dr. Emani Petty   
   
                      IG %       0.8 %      Critically high 0.0-0.5    [facility]  
   
                                        Comment on above:   Performed By: #### C  
BC ####  
[facility]  
[address] [place] [address]  
Dr. Emani Petty   
   
                      LYMPH #    1.7 103/ul Normal     1.2-3.8    [facility]  
   
                                        Comment on above:   Performed By: #### C  
BC ####  
[facility]  
[address] [place] [address]  
Dr. Emani Petty   
   
                                                    Lymphocytes/100 WBC   
(Bld)           27.7 %          Normal          20.5-60.0       [facility]  
   
                                        Comment on above:   Performed By: #### C  
BC ####  
[facility]  
[address] [place] [address]  
Dr. Emani Petty   
   
                      MANUAL DIFF REQ NO         Normal                [facility]  
   
                                        Comment on above:   Performed By: #### C  
BC ####  
[facility]  
[address] [place] [address]  
Dr. Emani Petty   
   
                                                    MCH (RBC) [Entitic   
mass]           29.6 pg         Normal          25.9-34.0       [facility]  
   
                                        Comment on above:   Performed By: #### C  
BC ####  
[facility]  
[address] [address]  
Dr. Emani Petty   
   
                                                    MCHC (RBC)   
[Mass/Vol]      32.5 g/dL       Normal          29.9-35.2       [facility]  
   
                                        Comment on above:   Performed By: #### C  
BC ####  
[facility]  
[address] [address]  
Dr. Emani Petty   
   
                                                    MCV (RBC) [Entitic   
vol]            91.1 fL         Normal          80.0-94.0       [facility]  
   
                                        Comment on above:   Performed By: #### C  
BC ####  
[facility]  
[address] [address]  
Dr. Emani Petty   
   
                      MONO #     0.7 103/ul Normal     0.3-0.8    [facility]  
   
                                        Comment on above:   Performed By: #### C  
BC ####  
[facility]  
[address] [place] [address]  
Dr. Emani Petty   
   
                                                    Monocytes/100 WBC   
(Bld)           10.8 %          Normal          1.7-12.0        [facility]  
   
                                        Comment on above:   Performed By: #### C  
BC ####  
[facility]  
[address] [place] [address]  
Dr. Emani Petty   
   
                      NEUT #     3.6 103/ul Normal     1.4-6.5    [facility]  
   
                                        Comment on above:   Performed By: #### C  
BC ####  
[facility]  
[address] [address]  
Dr. Emani Petty   
   
                                                    Neutrophils/100 WBC   
(Bld)           57.9 %          Normal          43.0-75.0       [facility]  
   
                                        Comment on above:   Performed By: #### C  
BC ####  
[facility]  
[address] [address]  
Dr. Emani Petty   
   
                                                    Platelet mean   
volume (Bld)   
[Entitic vol]   9.1 fL          Critically low  9.5-13.5        [facility]  
   
                                        Comment on above:   Performed By: #### C  
BC ####  
[facility]  
[address] [address]  
Dr. Emani Petty   
   
                      PLT        331 103/ul Normal     150-450    [facility]  
   
                                        Comment on above:   Performed By: #### C  
BC ####  
[facility]  
[address] [address]  
Dr. Emani Petty   
   
                      RBC        5.51 106/ul Normal     4.70-6.10  [facility]  
   
                                        Comment on above:   Performed By: #### C  
BC ####  
[facility]  
[address] [address]  
Dr. Emani Petty   
   
                      WBC        6.3 103/ul Normal     4.0-11.0   [facility]  
   
                                        Comment on above:   Performed By: #### C  
BC ####  
[facility]  
[address] [address]  
Dr. Emani Petty   
   
                                                    LIPID PROFILEon 2022   
   
                      CHOL-HDL RATIO NORM SEE BELOW  Normal                [facility]  
   
                                        Comment on above:   Result Comment: 3.3   
- 4.4 LOW RISK 4.4 - 7.1 AVERAGE RISK 7.1   
-   
11.0 MODERATE RISK >11.0 HIGH RISK   
   
                                                            Performed By: #### T  
4, CMP, LIPID, TSH ####  
[facility]  
[address] [place] [address]  
Dr. Emani Petty   
   
                                                    Cholesterol   
[Mass/Vol]      167 mg/dL       Normal          <=200           [facility]  
   
                                        Comment on above:   Performed By: #### T  
4, CMP, LIPID, TSH ####  
[facility]  
[address] [place] [address]  
Dr. Emani Petty   
   
                                                    Cholesterol in HDL   
[Mass/Vol]      53 mg/dL        Normal                          [facility]  
   
                                        Comment on above:   Performed By: #### T  
4, CMP, LIPID, TSH ####  
[facility]  
[address] [address]  
Dr. Emani Petty   
   
                                                    Cholesterol in LDL   
[Mass/Vol]      97.2 mg/dL      Normal                          [facility]  
   
                                        Comment on above:   Performed By: #### T  
4, CMP, LIPID, TSH ####  
[facility]  
[address] [place] [address]  
Dr. Emani Petty   
   
                                                    Cholesterol.total/C  
holesterol in HDL   
[Mass ratio]    3.2 {ratio}     Normal                          [facility]  
   
                                        Comment on above:   Performed By: #### T  
4, CMP, LIPID, TSH ####  
[facility]  
[address] [address]  
Dr. Emani Petty   
   
                                        HDL NORMAL          > or = 60 mg/dl - LO  
W   
CARDIOVASCULAR RISK   
<40 mg/dl - HIGH   
CARDIOVASCULAR RISK Normal                                  [facility]  
   
                                        Comment on above:   Performed By: #### T  
4, CMP, LIPID, TSH ####  
[facility]  
[address] [address]  
Dr. Emani Petty   
   
                      LDL CALC NORMAL SEE BELOW  Normal                [facility]  
   
                                        Comment on above:   Result Comment: <100  
 mg/dl OPTIMAL 100 - 129 mg/dl NEAR OR   
ABOVE   
OPTIMAL 130 - 159 mg/dl BORDERLINE HIGH 160 - 189 mg/dl HIGH >190   
mg/dl VERY HIGH   
   
                                                            Performed By: #### T  
4, CMP, LIPID, TSH ####  
[facility]  
[address] [address]  
Dr. Emani Petty   
   
                                                    Triglyceride   
[Mass/Vol]      84 mg/dL        Normal          <=150           [facility]  
   
                                        Comment on above:   Performed By: #### T  
4, CMP, LIPID, TSH ####  
[facility]  
[address] [address]  
Dr. Emani Petty   
   
                      VLDL CALC  16.8 mg/dL Normal                [facility]  
   
                                        Comment on above:   Performed By: #### T  
4, CMP, LIPID, TSH ####  
[facility]  
[address] [place] [address]  
Dr. Emani Petty   
   
                                                    PROF 14(COMP METB)on   
022   
   
                      Albumin [Mass/Vol] 3.6 g/dL   Normal     3.5-5.0    [facility]  
   
                                        Comment on above:   Performed By: #### T  
4, CMP, LIPID, TSH ####  
[facility]  
[address] [place] [address]  
Dr. Emani Petty   
   
                                                    Albumin/Globulin   
[Mass ratio]    0.9 {ratio}     Normal                          [facility]  
   
                                        Comment on above:   Performed By: #### T  
4, CMP, LIPID, TSH ####  
[facility]  
[address] [place] [address]  
Dr. Emani Petty   
   
                                                    ALP [Catalytic   
activity/Vol]   63 U/L          Normal                    [facility]  
   
                                        Comment on above:   Performed By: #### T  
4, CMP, LIPID, TSH ####  
[facility]  
[address] [address]  
Dr. Emani Petty   
   
                                                    ALT [Catalytic   
activity/Vol]   42 U/L          Normal          21-72           [facility]  
   
                                        Comment on above:   Performed By: #### T  
4, CMP, LIPID, TSH ####  
[facility]  
[address] [address]  
Dr. Emani Petty   
   
                                                    Anion gap   
[Moles/Vol]     11.3 mmol/L     Normal                          [facility]  
   
                                        Comment on above:   Performed By: #### T  
4, CMP, LIPID, TSH ####  
[facility]  
[address] [address]  
Dr. Emani Petty   
   
                                                    AST [Catalytic   
activity/Vol]   27 U/L          Normal          17-59           [facility]  
   
                                        Comment on above:   Performed By: #### T  
4, CMP, LIPID, TSH ####  
[facility]  
[address] [address]  
Dr. Emani Petty   
   
                                                    Bilirubin   
[Mass/Vol]      0.7 mg/dL       Normal          0.2-1.3         [facility]  
   
                                        Comment on above:   Performed By: #### T  
4, CMP, LIPID, TSH ####  
[facility]  
[address] [address]  
Dr. Emani Petty   
   
                      Calcium [Mass/Vol] 9.0 mg/dL  Normal     8.4-10.2   [facility]  
   
                                        Comment on above:   Performed By: #### T  
4, CMP, LIPID, TSH ####  
[facility]  
[address] [address]  
Dr. Emani Petty   
   
                                                    Chloride   
[Moles/Vol]     107 mmol/L      Normal                    [facility]  
   
                                        Comment on above:   Performed By: #### T  
4, CMP, LIPID, TSH ####  
[facility]  
[address] [address]  
Dr. Emani Petty   
   
                      CO2 [Moles/Vol] 26.2 mmol/L Normal     22.0-30.0  [facility]  
   
                                        Comment on above:   Performed By: #### T  
4, CMP, LIPID, TSH ####  
[facility]  
[address] [address]  
Dr. Emani Petty   
   
                                                    Creatinine   
[Mass/Vol]      1.06 mg/dL      Normal          0.66-1.25       [facility]  
   
                                        Comment on above:   Performed By: #### T  
4, CMP, LIPID, TSH ####  
[facility]  
[address] [address]  
Dr. Emani Petty   
   
                      EGFR-AF AMERICAN >60        Normal     >=60       [facility]  
   
                                        Comment on above:   Performed By: #### T  
4, CMP, LIPID, TSH ####  
[facility]  
[address] [address]  
Dr. Emani Petty   
   
                                                    EGFR-NON AF   
AMERICAN        >60             Normal          >=60            [facility]  
   
                                        Comment on above:   Performed By: #### T  
4, CMP, LIPID, TSH ####  
[facility]  
[address] [place] [address]  
Dr. Emani Petty   
   
                                                    Globulin (S)   
[Mass/Vol]      4.0 g/dL        Normal                          [facility]  
   
                                        Comment on above:   Performed By: #### T  
4, CMP, LIPID, TSH ####  
[facility]  
[address] [place] [address]  
Dr. Emani Petty   
   
                      Glucose [Mass/Vol] 91 mg/dL   Normal          [facility]  
   
                                        Comment on above:   Performed By: #### T  
4, CMP, LIPID, TSH ####  
[facility]  
[address] [place] [address]  
Dr. Emani Petty   
   
                                                    Potassium   
[Moles/Vol]     4.5 mmol/L      Normal          3.4-5.0         [facility]  
   
                                        Comment on above:   Performed By: #### T  
4, CMP, LIPID, TSH ####  
[facility]  
[address] [place] [address]  
Dr. Emani Petty   
   
                      Protein [Mass/Vol] 7.6 g/dL   Normal     6.1-8.2    [facility]  
   
                                        Comment on above:   Performed By: #### T  
4, CMP, LIPID, TSH ####  
[facility]  
[address] [place] [address]  
Dr. Emani Petty   
   
                      Sodium [Moles/Vol] 140 mmol/L Normal     137-145    [facility]  
   
                                        Comment on above:   Performed By: #### T  
4, CMP, LIPID, TSH ####  
[facility]  
[address] [place] [address]  
Dr. Emani Petty   
   
                                                    Urea nitrogen   
[Mass/Vol]      12.0 mg/dL      Normal          9.0-20.0        [facility]  
   
                                        Comment on above:   Performed By: #### T  
4, CMP, LIPID, TSH ####  
[facility]  
[address] [place] [address]  
Dr. Emani Petty   
   
                                                    Urea   
nitrogen/Creatinine   
[Mass ratio]    11.3 mg/mg      Normal                          [facility]  
   
                                        Comment on above:   Performed By: #### T  
4, CMP, LIPID, TSH ####  
[facility]  
[address] [place] [address]  
Dr. Emani Petty   
   
                                                    T4on 2022   
   
                      T4 [Mass/Vol] 11.90 ug/dL Critically high 5.53-11.00 [facility]  
   
                                        Comment on above:   Performed By: #### T  
4, CMP, LIPID, TSH ####  
[facility]  
[address] [place] [address]  
Dr. Emani Petty   
   
                                                    TSHon 2022   
   
                      TSH        1.352 uIU/mL Normal     0.470-4.680 [facility]  
   
                                        Comment on above:   Performed By: #### T  
4, CMP, LIPID, TSH ####  
[facility]  
[address] [place] [address]  
Dr. Emani Petty   
   
                      TSH RANGE  SEE BELOW  Normal                [facility]  
   
                                        Comment on above:   Result Comment: <0.3  
4 UIU/ml HYPERTHYROID 0.34-5.60 UIU/ml   
EUTHYROID >5.60 UIU/ml HYPOTHYROID   
   
                                                            Performed By: #### T  
4, CMP, LIPID, TSH ####  
[facility]  
[address] [place] [address]  
Dr. Emani Petty   
  
  
  
Vital Signs  
  
  
                      Date Time  Vital Sign Value      Performing Clinician Faci  
lity  
   
                                                    2024   
10:12050          Body height         185.4 cm            Sherry Gomez DPM  
Work Phone:   
[phone]                          [facility]  
   
                                                    2024   
10:                              Body mass index   
(BMI) [Ratio]             24.14 kg/m2               Sherry Gomez DPM  
Work Phone:   
[phone]                          [facility]  
   
                                                    2024   
10:12050          Body weight         83.01 kg            Sherry Gomez DPM  
Work Phone:   
[phone]                          [facility]  
  
  
  
Encounters  
  
  
                          Encounter Date Encounter Type Care Provider Facility  
   
                                                    Start: 2024  
End: 2024     ambulatory          Autumn L Schwab       Facility:[facility]  
   
                                                    Start: 2024  
End: 2024           Lab Drop off              Autumn L Schwab  
Work Phone:   
[phone]                           [facility]  
Work Phone:   
[phone]  
   
                                                    Start: 2024  
End: 2024     ambulatory          Autumn L Schwab       Facility:[facility]  
   
                                Start: 2024 Bamboo flowsheet Sherry SIMONS Murray  
her DPM  
Work Phone:   
[phone]                          [facility]  
   
                                Start: 2024 Bamboo flowsheet Sherry SIMNOS Murray  
her DPM  
Work Phone:   
[phone]                          [facility]  
   
                                                    Start: 2024  
End: 2024     ambulatory          SHERRY GOMEZ    Not Available  
   
                                                    Start: 2024  
End: 2024                         Patient encounter   
procedure                               Sherry S Patricia DPM  
Work Phone:   
[phone]                          [facility]  
   
                                        Comment on above:   Onychomycosis (Prima  
ry Dx);  
Onychodystrophy;  
Pain in both feet   
   
                          Start: 2023 ambulatory   [facility] Facility  
:[facility]   
[place]  
   
                                                    Start: 2023  
End: 2023     ambulatory          Autumn L Schwab       Facility:[facility]  
   
                                                    Start: 2023  
End: 2023                         Evaluation and management   
of inpatient              [facility]  
   
                                                    Start: 10-  
End: 10-     ambulatory          Autumn L Schwab       Facility:[facility]  
   
                                                    Start: 10-  
End: 10-           Lab Drop off              Autumn L Schwab  
Work Phone:   
[phone]                           [facility]  
Work Phone:   
[phone]  
   
                          Start: 10- ambulatory   [facility] Facility  
:[facility]  
   
                          Start: 2023 Letter encounter              Vadim spain  
   
                          Start: 2023 ambulatory   [facility] Facility  
:[facility]  
   
                          Start: 2023 Letter encounter              Vadim spain  
   
                          Start: 2022 Letter encounter              Vadim spain  
   
                                                    Start: 2022  
End: 2022     ambulatory          DR INDERJIT CARDENAS    Facility:  
   
                          Start: 10- Patient encounter status              
  [facility]  
Work Phone:   
[phone]  
  
  
  
Procedures  
  
  
                          Date         Procedure    Procedure Detail Performing   
Clinician  
   
                                        Start: 2013   Surgical fistula   
(morphologic abnormality)                           Jodi Schwab  
Work Phone:   
[phone]  
   
                                       Cholecystectomy              Jodi Schwab  
Work Phone:   
[phone]  
  
  
  
Plan of Treatment  
  
  
                          Date         Care Activity Detail       Author  
   
                          Start: 2024 ambulatory   Ambulatory   Facility:[facility]  
   
                                                    Start: 2024  
End: 2024                         Patient encounter   
procedure                               2024 10:15 AM EST   
Procedure Visit [facility]   
PODIATRY [address] [place] [address] [phone] Sherry Gomez, DPSIMONE [address]   
[place], [place]   
[id number] [phone]   
(Work) [phone]   
(Fax) Arrived                           [facility]  
   
                                        Comment on above:   Arrived   
   
                          Start: 2023 Influenza vaccination Influenza Vacc  
ine (#1) [facility]  
   
                                        Start: 2022   Abdominal aortic   
aneurysm screening                      Abdominal aortic   
aneurysm scan                           [facility]  
   
                                        Start: 2022   Abdominal Aortic   
Aneurysm Screening (Age   
65+)                                    Abdominal Aortic   
Aneurysm Screening (Age   
65+)                                    [facility]  
   
                                        Start: 2022   Pneumococcal   
vaccination                             Pneumococcal Vaccine(s)   
(65+ yrs) (1 - PCV)                     [facility]  
   
                          Start: 10- Influenza vaccination Influenza Vacc  
ine (#1) [facility]  
   
                                        Start: 2017   RSV vaccine (optiona  
l   
60+ years)                              RSV vaccine (optional   
60+ years)                              [facility]  
   
                                Start: 2016 Annual wellness visit Annual W  
getachewness Visit   
()                                 [facility]  
   
                                        Start: 2007   Measurement of occul  
t   
blood in single stool   
specimen                  FIT                       [facility]  
   
                                        Start: 2007   Screening for malign  
ant   
neoplasm of colon         CRC Screening             [facility]  
   
                                        Start: 2007   Shingles (RZV) Vacci  
ne   
(1 of 2)                                Shingles (RZV) Vaccine   
(1 of 2)                                [facility]  
   
                                        Start: 2002   Screening for malign  
ant   
neoplasm of colon                                   [facility]  
   
                          Start: 1992 Lipid panel  [facility]  [facility]  
h  
   
                          Start: 1975 Hepatitis C screening Hepatitis C An  
tibody [facility]  
   
                                        Start: 1975   Tetanus + diphtheria  
 +   
acellular pertussis   
vaccine (product)         Tdap Booster              [facility]  
   
                          Start: 1958 COVID-19 Vaccine (#1) COVID-19 Vacci  
ne (#1) [facility]  
   
                                        Start: 1957   COVID-19 Vaccine   
(8440-5160 formulation)                 COVID-19 Vaccine   
(0927-9625 formulation)                 [facility]  
   
                                        Start: 1957   Screening for malign  
ant   
neoplasm of colon         Colonoscopy               [facility]  
  
  
  
Immunizations  
  
  
                      Immunization Date Immunization Notes      Care Provider Fa  
[facility]  
   
                                        2020          tetanus toxoid,   
reduced diphtheria   
toxoid, and acellular   
pertussis vaccine,   
adsorbed                                            Autumn Schwab  
Work Phone:   
[phone]                           [facility]  
   
                                        2017          influenza virus   
vaccine, unspecified   
formulation                                         Autumn Schwab  
Work Phone:   
[phone]                           [facility]  
   
                                        2017          influenza, injectabl  
e,   
quadrivalent,   
preservative free                                   Sherry Rusher DPM  
Work Phone:   
[phone]                          [facility]  
   
                                        10-          influenza, seasonal,  
   
injectable                                                  [facility]  
   
                                        10-          influenza, seasonal,  
   
injectable,   
preservative free                                   Sherry Rusher DPM  
Work Phone:   
[phone]                          [facility]  
   
                                        10-          influenza virus   
vaccine, unspecified   
formulation                                                 [facility]  
   
                                        2014          influenza, seasonal,  
   
injectable,   
preservative free                                           [facility]  
  
  
  
Payers  
  
  
                          Date         Payer Category Payer        Policy ID  
   
                          2015   Medicare                  [id number]  
[id number]  
[id number]  
   
                                2013      Unknown         [employer] - BLUE CR  
OSS BLUE   
CROSS/HMO,PPO,POS   
[id number]   
2013-Present [address]   
[address] [place] [address] PPO            [id number]  
[id number]  
   
                          1960   Unknown                   [id number]  
   
                          1957   Unknown                   [id number]   
[id number]  
   
                          1957   Unknown                   [id number]   
[id number]  
   
                          1957   Unknown                   [id number]   
[id number]  
   
                          1957   Unknown                   [id number]   
[id number]  
   
                          1957   Unknown                   [id number]   
[id number]  
   
                          1957   Unknown                   [id number]   
[id number]  
   
                          1957   Unknown                   [id number]   
[id number]  
   
                          1957   Unknown                   [id number]   
[id number]  
   
                          1957   Unknown                   [id number]   
[id number]  
   
                          1957   Unknown                   [id number]   
[id number]  
   
                          1957   Unknown                   [id number]   
[id number]  
   
                          1957   Unknown                   [id number]   
[id number]  
   
                          1957   Unknown                   [id number]   
[id number]  
   
                          1957   Unknown                   [id number]   
[id number]  
  
  
  
Social History  
  
  
                          Date         Type         Detail       Facility  
   
                                                    Start: 02-  
End: 2024                         Tobacco smoking   
status NHIS               Ex-smoker                 [facility]  
Work Phone:   
[phone]  
   
                                                      
End: 2013                         History of tobacco   
use                       Current smoker            [facility]  
   
                                                    Start: 02-  
End: 2023                         Tobacco use and   
exposure                                Smokeless tobacco   
non-user                                [facility]  
   
                                Start: 02- Alcohol intake  Current non-dr  
 of   
alcohol (finding)                       [facility]  
   
                          Start: 1955 Sex Assigned At Birth Not on file  M  
[facility]  
   
                                                    Start: 2023  
End: 2024     Gender identity     Not on file         [facility]  
   
                                                      
End: 2013                         History of tobacco   
use                       Cigarette Smoker          [facility]  
   
                                                    Start: 2023  
End: 2024           Alcohol intake            Lifetime non-drinker   
(finding)                               [facility]  
   
                                                    Start: 2023  
End: 2024                         History of Social   
function                                            [facility]  
  
  
  
History of Present illness Narrative 2024  
                 Sherry Gomez DPM - 2024 10:15 AM EST  
  
                                Note Date & Type Note            Facility  
   
                                                    2024 History of Presen  
t   
illness Narrative                       Formatting of this note is different   
from the original.  
Images from the original note were not   
included.  
  
  
Subjective  
Patient ID: Tawanda Villarreal is a 68   
y.o. male who presents for Nail Care   
(Pt presents today requesting nail   
care, he is with his fiance/SS: 12).  
  
HPI  
Established patient presents to clinic   
with concern of painful toenails.  
  
Review of Systems  
Constitutional: Negative for activity   
change and appetite change.  
Respiratory: Negative for chest   
tightness and shortness of breath.  
Cardiovascular: Positive for leg   
swelling. Negative for chest pain.  
Endocrine: Negative for cold   
intolerance and heat intolerance.  
Musculoskeletal: Positive for   
arthralgias.  
Skin: Negative for color change and   
wound.  
Allergic/Immunologic: Negative for   
immunocompromised state.  
Neurological: Negative for weakness   
and numbness.  
Hematological: Does not bruise/bleed   
easily.  
Psychiatric/Behavioral: Negative for   
agitation and behavioral problems.  
  
Medications  
  
Current Outpatient Medications:  
donepezil (Aricept) 5 MG tablet, Take   
5 mg by mouth at bedtime, Disp: , Rfl:  
memantine (Namenda) 5 MG tablet, Take   
by mouth, Disp: , Rfl:  
  
Allergies  
Patient has no known allergies.  
  
Past Surgical History  
Past Surgical History:  
Procedure Laterality Date  
CHOLECYSTECTOMY  
TOTAL KNEE ARTHROPLASTY Right  
  
Family History  
No family history on file.  
  
Objective  
Physical Exam  
Constitutional:  
General: He is not in acute distress.  
Comments: Accompanied by caregiver.  
HENT:  
Head: Normocephalic and atraumatic.  
Eyes:  
Comments: Right eyelid closed.  
Cardiovascular:  
Pulses: Normal pulses.  
Pulmonary:  
Effort: Pulmonary effort is normal. No   
respiratory distress.  
Musculoskeletal:  
Cervical back: Neck supple.  
Comments: No obvious muscle deficits.   
No significant pedal deformities.  
Skin:  
Capillary Refill: Capillary refill   
takes less than 2 seconds.  
Comments: 4 toenails exhibit clinical   
mycosis with thickened appearance,   
yellow discoloration, crumbly texture   
and subungual debris. All 10 toenails   
are elongated. Tenderness to   
palpation: Bilateral hallux  
Neurological:  
Mental Status: He is alert.  
Comments: No loss of protective   
sensation, gross sensation intact.  
Psychiatric:  
Mood and Affect: Mood normal.  
Behavior: Behavior normal.  
  
Assessment/Plan  
ICD-10-CM  
1. Onychomycosis B35.1  
  
2. Onychodystrophy L60.3  
  
3. Pain in both feet M79.671  
M79.672  
  
  
Patient was examined and evaluated. 10   
toenails were debrided in length and   
thickness today utilizing a nail   
nipper and electric bur    
without incident. Patient noted relief   
of symptomatology post-debridement. I   
have recommended moisturizing the feet   
on a daily basis and discussed proper   
pedal hygiene. Follow-up four months   
for continued palliative nail care.  
  
This note was created with the   
assistance of a speech recognition   
program. While intending to generate a   
timely document that accurately   
reflects the content of the visit, no   
guarantee can be provided that every   
grammatical or spelling mistake has   
been or will be identified or   
corrected. Thank you for your   
understanding.  
  
Sherry Gomez DPM  
Electronically signed by Sherry Gomez DPM at 2024 10:39 AM EST  
documented in this encounter            [facility]  
  
  
  
Evaluation + Plan note  
  
  
                                Note Date & Type Note            Facility  
   
                                        Evaluation + Plan note   
  
  
No data available for this   
section                                 [facility]  
  
  
  
Evaluation + Plan note  
                                   Laboratory  
  
                                Note Date & Type Note            Facility  
   
                                        Evaluation + Plan note   
  
  
Future Appointments  
  
  
Appointment Date:2024   
01:00:00 PM  
Scheduled Provider:  
Location:[facility]  
Appointment Type: Medicare   
Wellness Initial  
  
  
  
Appointment Date:2024   
02:40:00 PM  
Scheduled Provider:Schwab FNP, Jodi L  
[facility]  
Appointment Type: Open  
  
  
  
Future Scheduled TestsPSA   
Screen, Total 10/31/23  
  
                                        [facility]  
  
  
  
Evaluation note  
  
  
                                Note Date & Type Note            Facility  
  
  
  
                                                    Evaluation note   
  
  
  
                                                    Diagnosis  
   
                                                      
  
  
Onychomycosis- Primary  
  
  
Dermatophytosis of nail  
   
                                                      
  
  
Onychodystrophy  
  
  
Other specified disease of nail  
   
                                                      
  
  
Pain in both feet  
  
documented in this encounter  
[facility]  
  
Hospital Discharge instructions  
  
  
                                Note Date & Type Note            Facility  
   
                                        Hospital Discharge instructions   
  
  
No data available for this   
section                                 [facility]  
  
  
  
Progress note  
  
  
                                Note Date & Type Note            Facility  
   
                                        Progress note         
  
  
No data available for this section      [facility]  
  
  
  
Summary Purpose  
  
  
                                                      
  
  
  
Family History  
No Family History Records Found  
  
No data available for this section  
  
No Family History Records FoundNo Family History Records FoundNo Family History 
Records Found  
  
No data available for this section  
  
No Family History Records Found  
  
Advance Directives  
No Advanced Directives Records FoundLatest Code Status on File  
  
                          Code Status  Date Activated Date Inactivated Comments  
   
                          Full Code    10/29/2013 7:58 PM 2013 2:18 PM   
  
  
  
                                                                   
   
                          Full Code    10/9/2013 12:52 PM 10/15/2013 6:01 PM   
  
  
  
                                                                   
   
                          Full Code    2013 9:59 PM 2013 12:53 PM   
  
                           Latest Code Status on File  
  
                          Code Status  Date Activated Date Inactivated Comments  
   
                          Full Code    10/29/2013 7:58 PM 2013 2:18 PM   
  
                               Code Status History  
  
                          Code Status  Date Activated Date Inactivated Comments  
   
                          Full Code    10/9/2013 12:52 PM 10/15/2013 6:01 PM   
   
                          Full Code    2013 9:59 PM 2013 12:53 PM   
  
  
  
Additional Source Comments  
  
  
  
                                                    PREGNANCY (unrecognized sect  
ion and content)  
   
                                                    No Pregnancy Status Records   
FoundNo Pregnancy Status Records FoundNo Pregnancy   
Status   
Records FoundNo Pregnancy Status Records FoundNo Pregnancy Status Records Found  
  
  
  
  
                                                    INFORMATION SOURCE (unrecogn  
ized section and content)  
   
                                          
  
  
  
                                        DATE CREATED        AUTHOR  
   
                                2022                      [facility]  
[facility]  
  
  
  
                                DATE CREATED    AUTHOR          AUTHOR'S ORGANIZ  
ATION  
   
                                2023                      [facility]  
  
  
  
                                DATE CREATED    AUTHOR          AUTHOR'S ORGANIZ  
ATION  
   
                                2024                      [facility]  
  
  
  
                                DATE CREATED    AUTHOR          AUTHOR'S ORGANIZ  
ATION  
   
                                2024                      [facility]  
[facility]  
  
  
  
                                DATE CREATED    AUTHOR          AUTHOR'S ORGANIZ  
ATION  
   
                                2024                      [facility]  
  
  
  
  
  
                                                    Patient Care team informatio  
n (unrecognized section and content)  
   
                                                      
  
  
Personnel  
  
  
Name: Live Alexandre MD  
Address:  
Address: [facility] [place] [address]-   
  
  
  
Personnel  
  
  
Name: Schwab FNP, Autumn L  
Address:  
Address: [address] [place] [address]-  
  
  
  
  
  
                                                    Reason for Visit (unrecogniz  
ed section and content)  
   
                                          
  
  
  
                                        Reason              Comments  
   
                                        Nail Care           Pt presents today re  
questing nail care, he is with his fianceSS: 12  
  
  
FOR RECORDS PERTAINING TO PATIENTS WHO ARE OR HAVE BEEN ENROLLED IN A CHEMICAL 
DEPENDENCY/SUBSTANCEABUSE PROGRAM, SOME INFORMATION MAY BE OMITTED. This 
clinical summary was aggregated from multiple sources. Caution should be 
exercised in using it in the provision of clinical care. This summary normalizes
 information from multiple sources, and as a consequence, information in this 
document may materially change the coding, format and clinical context of 
patient data. In addition, data may be omitted in some cases. CLINICAL DECISIONS
 SHOULD BE BASED ON THE PRIMARY CLINICAL RECORDS. [employer] provides
 no warranty or guarantee of the accuracy or completeness of information in this
 document.

## 2024-03-21 NOTE — US_ITS
The [address] [address] 
     [phone] 
  
  
Patient Name: 
SANGITA BARRY 
  
MRN: [id number]    YOB: 1955    Sex: M 
Assigned Patient Location: [place] 
Current Patient Location: [place] 
Accession/Order Number: [id number] 
Exam Date: 3/21/2024  08:30    Report Date: 3/21/2024  12:55 
  
At the request of: 
JODI L SCHWAB   
  
Procedure:  US renal BI 
  
EXAMINATION: US renal BI  
  
HISTORY: Mass Of Right Kidney 
  
COMPARISON: CT abdomen pelvis 11/22/2023 
  
TECHNIQUE: Ultrasound examination was performed of the kidneys and urinary  
bladder. 
  
FINDINGS: 
  
RIGHT KIDNEY: Superior pole thinly septated, lobulated, anechoic cystic  
structures 6.7 x 6.4 x 4.8 cm. No evidence of pelvocaliectasis, soft tissue  
mass, or calculi. Normal renal cortical parenchymal echogenicity. Color  
Doppler  
demonstrates blood flow within the kidney. Kidney: 9.4 x 5.5 x 5.8 cm 
  
LEFT KIDNEY: Contains a nonobstructing 7 mm stone. No evidence of  
pelvocaliectasis or mass. Normal renal cortical parenchymal echogenicity.  
Color  
Doppler demonstrates blood flow within the kidney. Kidney: 11.5 x 5.4 x 6.5 cm 
  
BLADDER: No visible wall thickening, mass, or calculi.  
  
ORDER #: [id number] US/US renal BI  
IMPRESSION:   
   
1. Thinly septated anechoic cystic structure arising from superior pole of   
right kidney, 6.7 cm in diameter favoring a benign cyst (Bosniak 2F).  
2. Nonobstructing left nephrolithiasis.  
   
   
Electronically authenticated by: CHUYITA CALDERON   Date: 3/21/2024  12:55

## 2024-10-03 ENCOUNTER — HOSPITAL ENCOUNTER
Dept: HOSPITAL 101 - US | Age: 69
Discharge: HOME | End: 2024-10-03
Payer: MEDICARE

## 2024-10-03 DIAGNOSIS — N28.1: Primary | ICD-10-CM

## 2024-10-03 DIAGNOSIS — N20.0: ICD-10-CM

## 2024-10-03 PROCEDURE — 76775 US EXAM ABDO BACK WALL LIM: CPT

## 2024-10-03 NOTE — XMS_ITS
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  
Patient is accompanied   
by Caregiver / Legal   
Guardian Yesica Estrada.  
The patient and   
caregiver were given a   
customized and   
personalized print out   
of all the current   
AHRQ USPSTF?s   
recommendations for   
preventative services   
and all current CDC   
recommended   
immunizations,   
relevant risk   
recommendations and   
the following patient   
brochures were given.  
Reviewed Medicare   
Prevention Services   
checklist.  
CDC-Falls Prevention   
and home safety   
screening reviewed.   
Patient caregiver   
denies any patient   
falls in last 12   
months, voices no   
worry about patient   
falling. Patient   
exhibits no problems   
with sitting, standing   
or ambulation. Patient   
caregiver aware with   
keeping walk way area   
free of clutter to   
prevent tripping   
and/or falling.  
[place] Advance   
Directives reviewed.   
Caregiver Yesica   
states she is   
patient's legal   
guardian but patient   
does not have a living   
will filled out,   
patient does have a   
, educational   
handout given to   
Yesica Robert states   
she will contact   
patient  to   
discuss if she should   
fill out a living will   
for patient,   
encouraged to bring in   
for scanning into   
chart.  
Patient caregiver   
states patient needs   
assistance with ADL's   
and is dependent for   
Instrumental ADL's.  
Cognitive screening   
completed with memory   
and clock face   
drawing. Positive for   
cognitive impairment.   
MMSE Score: 9.  
Immunization record   
reviewed, discussed   
Shingrix and   
Pneumococcal vaccines   
with educational   
handout and   
availability. COVID   
vaccines have not been   
administered.  
Allergies and   
medications reviewed   
and up to date. No   
concerns with taking   
medication as   
prescribed. Reviewed   
OTC medications,   
medication list up to   
date.  
Blood tests were   
reviewed: Labs UTD.  
No concerns with   
bowel/ bladder. Per   
Caregiver patient has   
never had a   
colonoscopy but does   
have a family history   
of colon cancer.   
Referral placed by PCP   
to [facility] per Yesica   
request.  
Reviewed pain symptoms   
: no c/o pain, no   
signs of pain noted in   
patient facial   
expression or actions.  
Reviewed all outside   
providers that patient   
follows. Last visit   
summary notes   
available in chart   
and/or have been   
requested.  
Unable to screen for   
depression due to   
cognitive impairment,   
Caregiver Yesica   
states patient does   
not exhibit signs of   
being depressed, does   
engage per usual in   
activities that   
patient enjoys. 10   
minutes spent with   
screening discussion   
and documentation.  
Caregiver states   
patient does not drink   
alcohol, denies   
concerns. 8 minutes   
spent with screening   
and documentation.   
Audit score 0.  
Follow up scheduled   
with PCP, 24.  
AWV has been   
scheduled, 25.  
Medicare provides   
yearly screening for   
alcohol and depression   
concerns. This is   
completed during our   
Medicare wellness   
visit for those who do   
not have a current   
diagnosis of   
depression or concerns   
with alcohol use. I   
spent a total of 18   
minutes on this date   
of service which   
included preparing to   
see the patient, face   
to face patient care,   
completing clinical   
documentation,   
obtaining and/or   
reviewing separately   
obtained history,   
counseling and   
educating the patient   
and caregiver with   
handouts. Explanations   
were provided with   
reviewing   
questionnaires. AUDIT   
risk assessment   
screening completed,   
risk score (0) with   
patient caregiver   
denying concerns with   
use. Unable to   
complete PHQ-2 risk   
assessment for   
depression due to   
cognitive impairment.   
Patient caregiver to   
notify the provider if   
there would be a   
change or concerns   
with symptoms with   
fear, unable to sleep,   
worrying too much or   
feeling down and/or   
sad with lost of   
interest with daily   
activities. Will   
continue to monitor   
with screening yearly   
during Medicare   
wellness visits.  
2. Overweight (E66.3:   
Overweight)  
A combination of diet   
and exercise can help   
you lose weight.   
Discussed weight loss   
benefits to dietary   
management and overall   
health with increased   
cardiovascular risks   
associated with waist   
measurement female>35   
men>40. Reminded of   
importance to work on   
lowering current body   
weight with healthy   
dietary intake choices   
and portion control.   
Reviewed goals and   
patients readiness   
with needing to make a   
lifestyle change. Will   
work on increasing   
daily activity to   
prevent further weight   
gain. Will continue to   
monitor during office   
visits with progress.  
3. Head injury   
(S09.90XA: Unspecified   
injury of head,   
initial encounter)  
Patient taking Namenda   
and Aricept daily as   
prescribed. Caregiver   
states she does not   
notice much of a   
difference in patient   
memory or cognitive   
function on these   
medications. Patient   
to follow up with PCP.  
4. Encounter for   
abdominal aortic   
aneurysm (AAA)   
screening (Z13.6:   
Encounter for   
screening for   
cardiovascular   
disorders)  
Patient to h (more   
content not   
included)...        Normal                                  [facility]  
   
                                        Comment on above:   Result Comment: Elec  
tronically Signed By: Schwab FNP, Jodi L\.br\Date and Time Signed: 24 11:08 EDT\.br\Electronically   
Co-Signed By: Teresita Troy LPN\.br\Date and Time Co-Signed:   
24 12:17 EDT   
   
                                                    Ambulatory Visit Summaryon 0  
2024   
   
                                                    Ambulatory Visit   
Summary                                 [Image Removed]  
TAWANDA VILLARREAL  
:1955  
MRN:[id number]  
Visit Date:2024  
Ambulatory Visit   
Instructions  
Your Diagnosis  
Annual visit for   
general adult medical   
examination without   
abnormal findings  
Overweight  
Head injury  
Encounter for   
abdominal aortic   
aneurysm (AAA)   
screening  
Ex-light cigarette   
smoker (1-9 per day)  
Screening for colon   
cancer  
Screening for   
hepatitis C declined  
Tests Performed  
US Abdominal Aorta   
screening for AAA --   
Results Pending --  
Please visit your   
patient portal for   
your results or   
contact your primary   
care physician.  
Your Care Team  
Attending Physician -  
Schwab FNP, Jodi L  
Primary Care Physician   
-  
Schwab FNP, Jodi L  
This Is Your   
Medications List  
donepezil (donepezil   
10 mg Tab)  
memantine (memantine 5   
mg Tab)  
multivitamin  
Procedures Performed  
Shunt (),   
Cholecystectomy.  
Discharge Vitals  
Heart Rate   
(Peripheral)  
55  
Respiratory Rate  
18  
Blood Pressure  
122/82  
Height  
178 cm  
Height  
70 in  
Weight  
82 kg  
Weight  
180.4 lb  
BMI  
25.88  
What to do next  
Scheduled Follow-Up   
Appointments  
Monday Sep. 30, 2024   
11:20 AM EDT  
With: Schwab FNP, Jodi L  
Where: [facility]   
[facility] [place]         Normal                    [address] [id number]- [phone]\.br\   
Someone Will   
Contact You   
Regarding These   
Appointments\.br\   
[facility]   
Ambulatory   
Referral, Other   
(needs to be   
filled in),   
Gastroenterology,   
Colon Cancer   
Screening, family   
history colon   
cancer,   
Requesting [facility]   
[facility],   
24 11:35:00   
EDT, Screening   
for colon   
cancer\.br\   
Medications\.br\   
What How Much   
When Why   
Instructions\.br\   
Unchanged   
donepezil   
(donepezil 10 mg   
Tab) 1 Tablets By   
Mouth Once a day   
(at bedtime)   
Wellness   
examination   
Screening for   
hyperlipidemia   
Screening for   
prostate cancer   
Fatigue Nocturia   
Allergies Brain   
damage Head   
injury BMI   
26.0-26.9,adult   
Former   
smoker\.br\   
Unchanged   
memantine   
(memantine 5 mg   
Tab) 1 Tablets By   
Mouth Every day   
Wellness   
examination   
Screening for   
hyperlipidemia   
Screening for   
prostate cancer   
Fatigue Nocturia   
Allergies Brain   
damage Head   
injury BMI   
26.0-26.9,adult   
Former   
smoker\.br\   
Unchanged   
multivitamin   
Men's Over 50   
Multi Vitamin   
\.br\   
Allergies\.br\ No   
Known   
Allergies\.br\ No   
Known Medication   
Allergies\.br\   
Problems\.br\   
Ongoing - Any   
problem that you   
are currently   
receiving   
treatment   
for.\.br\   
Allergies\.br\   
BMI   
25.0-25.9,adult\.  
br\ Change in   
skin mole\.br\   
Elevated PSA\.br\   
Fatigue\.br\ Head   
injury\.br\   
Jaundice\.br\   
Nocturia\.br\   
Right kidney   
mass\.br\   
Screening for   
hyperlipidemia\.b  
r\ Screening for   
prostate   
cancer\.br\ Skin   
texture   
changes\.br\   
Sleep   
disorder\.br\   
Wellness   
examination\.br\   
Patient   
Survey\.br\ You   
may receive a   
survey via text   
or e-mail asking   
about your office   
visit. Please   
share your   
experience with   
us by completing   
your survey. We   
appreciate your   
feedback and   
thank you for   
choosing us for   
your care.\.br\   
Education   
Materials\.br\   
Healthy   
Eating\.br\   
Following a   
healthy eating   
pattern may help   
you to achieve   
and maintain a   
healthy body   
weight, reduce   
the risk of   
chronic disease,   
and live a long   
and productive   
life. It is   
important to   
follow a healthy   
eating pattern at   
an appropriate   
calorie level for   
your body. Your   
nutritional needs   
should be met   
primarily through   
food by choosing   
a variety of   
nutrient-rich   
foods.\.br\ What   
are tips for   
following this   
plan?\.br\   
Reading food   
labels\.br\ ?   
\.br\ Read labels   
and choose the   
following:\.br\ ?   
\.br\ Reduced or   
low sodium.\.br\   
? \.br\ Juices   
with 100% fruit   
juice.\.br\ ?   
\.br\ Foods with   
low saturated   
fats and high   
polyunsaturated   
and   
monounsaturated   
fats.\.br\ ?   
\.br\ Foods with   
whole grains,   
such as whole   
wheat, cracked   
wheat, brown   
rice, and wild   
rice.\.br\ ?   
\.br\ Whole   
grains that are   
fortified with   
folic acid. This   
is recommended   
for women who are   
pregnant or who   
want to become   
pregnant.\.br\ ?   
\.br\ Read labels   
and avoid the   
following:\.br\ ?   
\.br\ Foods with   
a lot of added   
sugars. These   
include foods   
that contain   
brown sugar, corn   
sweetener, corn   
syrup, dextrose,   
fructose,   
glucose,   
high-fructose   
corn syrup,   
honey, invert   
sugar, lactose,   
malt syrup,   
maltose,   
molasses, raw   
sugar, sucrose,   
trehalose, or   
turbinado   
sugar.\.br\ ?   
\.br\ Do not eat   
more than the   
following amounts   
of added sugar   
per day:\.br\ ?   
\.br\ 6 teaspoons   
(25 g) for   
women.\.br\ ?   
\.br\ 9 teaspoons   
(38 g) for   
men.\.br\ ? \.br\   
Foods that   
contain processed   
or refined   
starches and   
grains.\.br\ ?   
\.br\ Refined   
grain products,   
such as white   
flour, degermed   
cornmeal, white   
bread, and white   
rice.\.br\   
Shopping\.br\ ?   
\.br\ Choose   
nutrient-rich   
snacks, such as   
vegetables, whole   
fruits, and nuts.   
Avoid   
high-calorie and   
high-sugar   
snacks, such as   
potato chips,   
fruit snacks, and   
candy.\.br\ ?   
\.br\ Use   
oil-based   
dressings and   
spreads on foods   
instead of solid   
fats such as   
butter, stick   
margarine, or   
cream   
cheese.\.br\ ?   
\.br\ Limit   
pre-made sauces,   
mixes, and   
 instant    
products such as   
flavored rice,   
instant noodles,   
and ready-made   
pasta.\.br\ ?   
\.br\ Try more   
plant-protein   
sources, such as   
tofu, tempeh,   
black beans,   
edamame, lentils,   
nuts, and   
seeds.\.br\ ?   
\.br\ Explore   
eating plans such   
as the   
Mediterranean   
diet or   
vegetarian   
diet.\.br\   
Cooking\.br\ ?   
\.br\ Use oil to   
saut? or stir-kenney   
foods instead of   
solid fats such   
as butter, stick   
margarine, or   
lard.\.br\ ?   
\.br\ Try baking,   
boiling,   
grilling, or   
broiling instead   
of frying.\.br\ ?   
\.br\ Remove the   
fatty part of   
meats before   
cooking.\.br\ ?   
\.br\ Steam   
vegetables in   
water or   
broth.\.br\ Meal   
planning\.br\   
[Image   
Removed]\.br\ ?   
\.br\ At meals,   
imagine dividing   
your plate into   
fourths:\.br\ ?   
\.br\ One-half of   
your plate is   
fruits and   
vegetables.\.br\   
? \.br\   
One-fourth of   
your plate is   
whole   
grains.\.br\ ?   
\.br\ One-fourth   
of your plate is   
protein,   
especially lean   
meats, poultry,   
eggs, tofu,   
beans, or   
nuts.\.br\ ?   
\.br\ Include   
low-fat dairy as   
part of your   
daily diet.\.br\   
Lifestyle\.br\ ?   
\.br\ Choose   
healthy options   
in all settings,   
including home,   
work, school,   
restaurants, or   
stores.\.br\ ?   
\.br\ Prepare   
your food   
safely:\.br\ ?   
\.br\ Wash your   
hands after   
handling raw   
meats.\.br\ ?   
\.br\ Keep food   
preparation   
surfaces clean by   
regularly washing   
with hot, soapy   
water.\.br\ ?   
\.br\ Keep raw   
meats separate   
from ready-to-eat   
foods, such as   
fruits and   
vegetables.\.br\   
? \.br\ Cook   
seafood, meat,   
poultry, and eggs   
to the   
recommended   
internal   
temperature.\.br\   
? \.br\ Store   
foods at safe   
temperatures. In   
general:\.br\ ?   
\.br\ Keep cold   
foods at 40?F   
(4.4?C) or   
below.\.br\ ?   
\.br\ Keep hot   
foods at 140?F   
(60?C) or   
above.\.br\ ?   
\.br\ Keep your   
freezer at 0?F   
(-17.8?C) or   
below.\.br\ ?   
\.br\ Foods are   
no longer safe to   
eat when they   
have been between   
the temperatures   
of 40??140?F   
(4.4?60?C) for   
more than 2   
hours.\.br\ What   
foods should I   
eat?\.br\   
Fruits\.br\ Aim   
to eat 2   
cup-equivalents   
of fresh, canned   
(in natural   
juice), or frozen   
fruits each day.   
Examples of 1   
cup-equivalent of   
fruit include 1   
small apple, 8   
large   
strawberries, 1   
cup canned fruit,   
? cup dried   
fruit, or 1 cup   
100% juice.\.br\   
Vegetables\.br\   
Aim to eat 2??3   
cup-equivalents   
of fresh and   
frozen vegetables   
each day,   
including   
different   
varieties and   
colors. Examples   
of 1   
cup-equivalent of   
vegetables   
include 2 medium   
carrots, 2 cups   
raw, leafy   
greens, 1 cup   
chopped vegetable   
(raw or cooked),   
or 1 medium baked   
potato.\.br\   
Grains\.br\ Aim   
to eat 6   
ounce-equivalents   
of whole grains   
each day.   
Examples of 1   
ounce-equivalent   
of grains include   
1 slice of bread,   
1 cup   
ready-to-eat   
cereal, 3 cups   
popcorn, or ? cup   
cooked rice,   
pasta, or   
cereal.\.br\   
Meats and other   
proteins\.br\ Aim   
to eat 5?6   
ounce-equivalents   
of protein each   
day. Examples of   
1   
ounce-equivalent   
of protein   
include 1 egg,   
1/2 cup nuts or   
seeds, or 1   
tablespoon (16 g)   
peanut butter. A   
cut of meat or   
fish that is the   
size of a deck of   
cards is about   
3?4   
ounce-equivalents  
.\.br\ ? \.br\ Of   
the protein you   
eat each week,   
try to have at   
least 8 ounces   
come from   
seafood. This   
includes salmon,   
trout, herring,   
and   
anchovies.\.br\   
Dairy\.br\ Aim to   
eat 3   
cup-equivalents   
of fat-free or   
low-fat dairy   
each day.   
Examples of 1   
cup-equivalent of   
dairy include 1   
cup (240 mL)   
milk, 8 ounces   
(250 g) yogurt,   
1? ounces (44 g)   
natural cheese,   
or 1 cup (240 mL)   
fortified soy   
milk.\.br\ Fats   
and oils\.br\ ?   
\.br\ Aim for   
about 5 teaspoons   
(21 g) per day.   
Choose   
monounsaturated   
fats, such as   
canola and olive   
oils, avocados,   
peanut butter,   
and most nuts, or   
polyunsaturated   
fats, such as   
sunflower, corn,   
and soybean oils,   
walnuts, pine   
nuts, sesame   
seeds, sunflower   
seeds, and   
flaxseed                                [facility]  
   
                                                    Ambulatory Visit   
Summary                                 [Image Removed]  
TAWANDA VILLARREAL  
:1955  
MRN:[id number]  
Visit Date:2024  
Ambulatory Visit   
Instructions  
Your Care Team  
Attending Physician -  
Schwab FNP, Jodi L  
Primary Care Physician   
-  
Schwab FNP, Jodi L  
This Is Your   
Medications List  
donepezil (donepezil   
10 mg Tab)  
memantine (memantine 5   
mg Tab)  
multivitamin  
Procedures Performed  
Shunt (),   
Cholecystectomy.  
Discharge Vitals  
Heart Rate   
(Peripheral)  
55  
Respiratory Rate  
18  
Blood Pressure  
122/82  
Height  
178 cm  
Height  
70 in  
Weight  
82 kg  
Weight  
180.4 lb  
BMI  
25.88  
What to do next  
Scheduled Follow-Up   
Appointments  
Monday Sep. 30, 2024   
11:20 AM EDT  
With: Schwab FNP, Jodi L  
Where: [facility]   
[facility] [place]         Normal                    [address] [place], [place]   
[id number]- [phone]\.br\   
Medications\.br\   
What How Much   
When Why   
Instructions\.br\   
Unchanged   
donepezil   
(donepezil 10 mg   
Tab) 1 Tablets By   
Mouth Once a day   
(at bedtime)   
Wellness   
examination   
Screening for   
hyperlipidemia   
Screening for   
prostate cancer   
Fatigue Nocturia   
Allergies Brain   
damage Head   
injury BMI   
26.0-26.9,adult   
Former   
smoker\.br\   
Unchanged   
memantine   
(memantine 5 mg   
Tab) 1 Tablets By   
Mouth Every day   
Wellness   
examination   
Screening for   
hyperlipidemia   
Screening for   
prostate cancer   
Fatigue Nocturia   
Allergies Brain   
damage Head   
injury BMI   
26.0-26.9,adult   
Former   
smoker\.br\   
Unchanged   
multivitamin   
Men's Over 50   
Multi Vitamin   
\.br\   
Allergies\.br\ No   
Known   
Allergies\.br\ No   
Known Medication   
Allergies\.br\   
Problems\.br\   
Ongoing - Any   
problem that you   
are currently   
receiving   
treatment   
for.\.br\   
Allergies\.br\   
Change in skin   
mole\.br\   
Elevated PSA\.br\   
Fatigue\.br\ Head   
injury\.br\   
Jaundice\.br\   
Nocturia\.br\   
Right kidney   
mass\.br\   
Screening for   
hyperlipidemia\.b  
r\ Screening for   
prostate   
cancer\.br\ Skin   
texture   
changes\.br\   
Sleep   
disorder\.br\   
Wellness   
examination\.br\   
Patient   
Survey\.br\ You   
may receive a   
survey via text   
or e-mail asking   
about your office   
visit. Please   
share your   
experience with   
us by completing   
your survey. We   
appreciate your   
feedback and   
thank you for   
choosing us for   
your care.\.br\   
[Image   
Removed]\.br\                           [facility]  
   
                                                    Patient Educationon 20  
24   
   
                                        Patient Education   Caregiving  
Advance Directive  
(Inserted Image.   
Unable to display)  
Advance directives are   
legal documents that   
allow you to make   
decisions about your   
health care and   
medical treatment in   
case you become unable   
to communicate for   
yourself. Advance   
directives let your   
wishes be known to   
family, friends, and   
health care providers.  
Discussing and writing   
advance directives   
should happen over   
time rather than all   
at once. Advance   
directives can be   
changed and updated at   
any time. There are   
different types of   
advance directives,   
such as:  
? Medical power of   
.  
? Living will.  
? Do not resuscitate   
(DNR) order or do not   
attempt resuscitation   
(DNAR) order.  
Health care proxy and   
medical power of   
  
A health care proxy is   
also called a health   
care agent. This   
person is appointed to   
make medical decisions   
for you when you are   
unable to make   
decisions for   
yourself. Generally,   
people ask a trusted   
friend or family   
member to act as their   
proxy and represent   
their preferences.   
Make sure you have an   
agreement with your   
trusted person to act   
as your proxy. A proxy   
may have to make a   
medical decision on   
your behalf if your   
wishes are not known.  
A medical power of   
, also called   
a durable power of   
 for health   
care, is a legal   
document that names   
your health care   
proxy. Depending on   
the laws in your   
state, the document   
may need to be:  
? Signed.  
? Notarized.  
? Dated.  
? Copied.  
? Witnessed.  
? Incorporated into   
your medical record.  
You may also want to   
appoint a trusted   
person to manage your   
money in the event you   
are unable to do so.   
This is called a   
durable power of   
 for finances.   
It is a separate legal   
document from the   
durable power of   
 for health   
care. You may choose   
your health care proxy   
or someone different   
to act as your agent   
in money matters.  
If you do not appoint   
a proxy, or there is a   
concern that the proxy   
is not acting in your   
best interest, a court   
may appoint a guardian   
to act on your behalf.  
Living will  
A living will is a set   
of instructions that   
state your wishes   
about medical care   
when you cannot   
express them yourself.   
Health care providers   
should keep a copy of   
your living will in   
your medical record.   
You may want to give a   
copy to family members   
or friends. To alert   
caregivers in case of   
an emergency, you can   
place a card in your   
wallet to let them   
know that you have a   
living will and where   
they can find it. A   
living will is used if   
you become:  
? Terminally ill.  
? Disabled.  
? Unable to   
communicate or make   
decisions.  
The following   
decisions should be   
included in your   
living will:  
? To use or not to use   
life support   
equipment, such as   
dialysis machines and   
breathing machines   
(ventilators).  
? Whether you want a   
DNR or DNAR order.   
This tells health care   
providers not to use   
cardiopulmonary   
resuscitation (CPR) if   
breathing or heartbeat   
stops.  
? To use or not to use   
tube feeding.  
? To be given or not   
to be given food and   
fluids.  
? Whether you want   
comfort (palliative)   
care when the goal   
becomes comfort rather   
than a cure.  
? Whether you want to   
donate your organs and   
tissues.  
A living will does not   
give instructions for   
distributing your   
money and property if   
you should pass away.  
DNR or DNAR  
A DNR or DNAR order is   
a request not to have   
CPR in the event that   
your heart stops   
beating or you stop   
breathing. If a DNR or   
DNAR order has not   
been made and shared,   
a health care provider   
will try to help any   
patient whose heart   
has stopped or who has   
stopped breathing. If   
you plan to have   
surgery, talk with   
your health care   
provider about how   
your DNR or DNAR order   
will be followed if   
problems occur.  
What if I do not have   
an advance directive?  
Some states assign   
family decision makers   
to act on your behalf   
if you do not have an   
advance directive.   
Each state has its own   
laws about advance   
directives. You may   
want to check with   
your health care   
provider, , or   
state representative   
about the laws in your   
state.  
Summary  
? Advance directives   
are legal documents   
that allow you to make   
decisions about your   
health care and   
medical treatment in   
case you become unable   
to communicate for   
yourself.  
? The process of   
discussing and writing   
advance directives   
should happen over   
time. You can change   
and update advance   
directives at any   
time.  
? Advance directives   
may include a medical   
power of , a   
living will, and a DNR   
or DNAR order.  
This information is   
not intended to   
replace advice given   
to you by your health   
care provider. Make   
sure you discuss any   
questions you have   
with your health care   
provider.  
Document Revised:   
2021 Document   
Reviewed: 2021  
Valentin Uzhun Patient   
Education ?    
[employer]   
Infectious Disease  
Pneumococcal Conjugate   
Vaccine: What You Need   
to Know  
1. Why get vaccinated?  
Pneumococcal conjugate   
vaccine can prevent   
pneumococcal disease.  
Pneumococcal disease   
refers to any illness   
caused by pneumococcal   
bacteria. These   
bacteria can cause   
many types of   
illnesses, includi   
(more content not   
included)...        Normal                                  [facility]  
   
                                                    Screenson 2024   
   
                                        Screens             [id number]  
40  
[id number]#1.0  
0TIFF               [facility]  
   
                                                    RAD - Ultrasound Reporton    
   
                                                    RAD - Ultrasound   
Report                                  [id number]  
[id number]#1.0  
0TIFF               [facility]  
   
                                                    Physician Orderon 2024  
   
   
                                        Physician Order     [id number]  
30  
[id number]#1.0  
0TIFF               [facility]  
   
                                                    Physician Referralon   
024   
   
                                        Physician Referral  [id number]  
30  
[id number]#1  
.00TIFF             [facility]  
   
                                                    Reminderson 2024   
   
                                        Reminders           --------------------  
-  
From: Schwab FNP, Jodi L  
To: B - Clinical;  
Sent: 3/20/2024   
08:41:38 EDT Show up:   
3/20/2024 08:42:00 EDT  
Subject: Ambulatory   
Reminder  
Due Date/Time:   
3/21/2024 08:41:00 EDT  
psa was normal  
Results:  
Date Result Name Value   
Ref Range  
3/19/2024 14:00 PSA   
Total 3.1 ng/mL (0.1 -   
3.5)  
Patient's guardian   
informed and voiced   
understanding.      Normal                                  [facility]  
   
                                                    Ambulatory Visit Summaryon 0  
3-   
   
                                                    Ambulatory Visit   
Summary                                 [Image Removed]  
TAWANDA VILLARREAL  
:1955  
MRN:[id number]  
Visit Date:2024  
Ambulatory Visit   
Instructions  
Your Diagnosis  
Elevated PSA  
BMI 26.0-26.9,adult  
Non-smoker  
Your Care Team  
Attending Physician -  
Schwab FNP, Jodi L  
Primary Care Physician   
-  
Schwab FNP, Jodi L  
This Is Your   
Medications List  
cetirizine (cetirizine   
10 mg oral capsule)  
donepezil (donepezil   
10 mg Tab)  
memantine (memantine 5   
mg Tab)  
Procedures Performed  
Shunt (),   
Cholecystectomy.  
Discharge Vitals  
Heart Rate   
(Peripheral)  
88  
Respiratory Rate  
18  
Blood Pressure  
118/84  
Height  
177 cm  
Height  
70 in  
Weight  
81.7 kg  
Weight  
179.74 lb  
BMI  
26.08  
What to do next  
Scheduled Follow-Up   
Appointments  
   
1:00 PM EDT  
With:  
Where: [facility]         Normal                    [address] [place] [address]   
[phone]- [phone]\.br\   
Medications\.br\   
What How Much   
When Why   
Instructions\.br\   
Unchanged   
cetirizine   
(cetirizine 10 mg   
oral capsule) 1   
Capsules By Mouth   
Every day as   
needed for for   
allergy symptoms   
Wellness   
examination   
Screening for   
hyperlipidemia   
Screening for   
prostate cancer   
Fatigue Nocturia   
Allergies Brain   
damage Head   
injury BMI   
26.0-26.9,adult   
Former   
smoker\.br\   
Unchanged   
donepezil   
(donepezil 10 mg   
Tab) 1 Tablets By   
Mouth Once a day   
(at bedtime)   
Wellness   
examination   
Screening for   
hyperlipidemia   
Screening for   
prostate cancer   
Fatigue Nocturia   
Allergies Brain   
damage Head   
injury BMI   
26.0-26.9,adult   
Former   
smoker\.br\   
Unchanged   
memantine   
(memantine 5 mg   
Tab) 1 Tablets By   
Mouth Every day   
Wellness   
examination   
Screening for   
hyperlipidemia   
Screening for   
prostate cancer   
Fatigue Nocturia   
Allergies Brain   
damage Head   
injury BMI   
26.0-26.9,adult   
Former   
smoker\.br\   
Allergies\.br\ No   
Known Medication   
Allergies\.br\   
Problems\.br\   
Ongoing - Any   
problem that you   
are currently   
receiving   
treatment   
for.\.br\   
Allergies\.br\   
Elevated PSA\.br\   
Fatigue\.br\ Head   
injury\.br\   
Jaundice\.br\   
Nocturia\.br\   
Screening for   
hyperlipidemia\.b  
r\ Screening for   
prostate   
cancer\.br\ Sleep   
disorder\.br\   
Wellness   
examination\.br\   
Patient   
Survey\.br\ You   
may receive a   
survey via text   
or e-mail asking   
about your office   
visit. Please   
share your   
experience with   
us by completing   
your survey. We   
appreciate your   
feedback and   
thank you for   
choosing us for   
your care.\.br\   
[Image   
Removed]\.br\                           Kishor   
[facility]  
   
                                                    Family Medicine Office/Clini  
c Noteon 2024   
   
                                                    Family Medicine   
Office/Clinic Note                      HPI Staff  
Tawanda is a 68 year   
old male presenting   
for follow up  
Would like re draw on   
PSA, last PSA PSA Scrn   
Tot.: 4.1 ng/mL High   
(10/27/23 09:13:00)  
Would a few moles   
looked at one of left   
side of face and a few   
one the back.  
History of Present   
Illness  
pt presents today for   
repeat PSA and has a   
couple moles he wants   
looked at  
Review of Systems  
PHQ Score  
Initial Depression   
Screen Score: 0 SCORE  
Physical Exam  
Vitals & Measurements  
HR: 88(Peripheral) RR:   
18 BP: 118/84 SpO2:   
97%  
HT: 70 in HT: 177 cm   
WT: 81.7 kg WT: 179.74   
lb BMI: 26.08  
General: alert, no   
acute distress  
ENMT: oral mucosa   
moist, no pharyngeal   
erythema or exudate  
Cardiovascular:   
regular rate and   
rhythm, normal   
peripheral perfusion  
Respiratory: Lungs   
CTA, respirations non   
labored  
Extremities: no   
deformity, no trauma  
Neurological: oriented   
x 4, LOC appropriate   
for age, CN II-XII   
intact, motor strength   
equal & normal   
bilaterally, speech   
normal  
mole on left temple   
are and on upper mid   
back dark, raised and   
change in texture  
Assessment/Plan  
1. Change in skin mole   
(D22.9: Melanocytic   
nevi, unspecified)  
one mole on left   
temple area and one on   
upper mid back has   
changed in size, color   
and texture. will   
refer to dermatology   
partners per pt   
request.  
  
2. Skin texture   
changes (R23.4:   
Changes in skin   
texture)  
see above  
  
3. Right kidney mass   
(N28.89: Other   
specified disorders of   
kidney and ureter)  
when patient was   
shipped to [place]. a   
mass was found on   
right kidney. they did   
not have any follow up   
to monitor this mass.   
caregiver says he had   
several appointments   
but she is not   
dragging him to [place]   
for things we can do   
locally. kidney u/s   
ordered to be done at   
[facility]  
  
4. Elevated PSA   
(R97.20: Elevated   
prostate specific   
antigen [PSA])  
psa drawn in office   
today  
Ordered:  
Lab Specimen Collect   
[id number]  
PSA Total  
  
5. BMI 26.0-26.9,adult   
(Z68.26: Body mass   
index [BMI] 26.0-26.9,   
adult)  
BMI education complete  
  
6. Non-smoker (Z78.9:   
Other specified health   
status)  
continue not smoking  
  
Follow-up  
No qualifying data   
available  
Problem List/Past   
Medical History  
Ongoing  
Allergies  
Change in skin mole  
Elevated PSA  
Fatigue  
Head injury  
Jaundice  
Nocturia  
Right kidney mass  
Screening for   
hyperlipidemia  
Screening for prostate   
cancer  
Skin texture changes  
Sleep disorder  
Wellness examination  
Historical  
No qualifying data  
Procedure/Surgical   
History  
Shunt (),   
Cholecystectomy.  
Medications  
cetirizine 10 mg oral   
capsule, 10 mg= 1   
cap(s), Oral, Daily,   
PRN, 2 refills  
donepezil 10 mg Tab,   
10 mg= 1 tab(s), Oral,   
Once a day (at   
bedtime), 2 refills  
memantine 5 mg Tab, 5   
mg= 1 tab(s), Oral,   
Daily, 2 refills  
Allergies  
No Known Medication   
Allergies  
Social History  
Tobacco  
Former smoker, quit   
more than 30 days ago   
Tobacco Use:.   
Cigarettes, Household   
tobacco concerns: No.,   
2024  
Immunizations  
Vaccine Date Status  
diphtheria/pertussis,   
acel/tetanus adult   
2020 Recorded  
influenza virus   
vaccine, inactivated   
2017 Recorded  
influenza virus   
vaccine, inactivated   
10/23/2015 Recorded Normal                                  [facility]   
[facility]  
   
                                        Comment on above:   Result Comment: Elec  
tronically Signed By: Schwab FNP, Autumn GOLD\.br\Date and Time Signed: 24 14:18 EDT   
   
                                                    PSA TotalOrdered By: SYSTEM   
SYSTEM on 2024   
   
                                                    Prostate specific   
Ag [Mass/Vol]   3.1 ng/mL       Normal          0.1-3.5         Remisol   
Chem  
   
                                        Comment on above:   Interpretive Data: T  
he concentration of PSA determined by   
different manufacturers can vary due to differences in assay   
methods and reagent specificity. Values obtained from different   
assay methods cannot be used interchangeably. The methodology used   
for this result was chemiluminescence using [employer]'s   
Access Hy[employer]itech PSA reagent.   
   
                                                            Result Comment: The   
concentration of PSA determined by different   
manufacturers can vary due to differences in assay methods and   
reagent specificity. Values obtained from different assay methods   
cannot be used interchangeably. The methodology used for this   
result was chemiluminescence using [employer]'s Access   
Hybritech PSA reagent.   
   
                                                            Performed By: #### 1  
[id number] ####[facility] [place] [address]   
   
                                                    XR SHUNT SERIES PLACEMENT (<  
4 VIEWS)on 2023   
   
                                                    XR SHUNT SERIES   
PLACEMENT (<4   
VIEWS)                                  EXAMINATION:  
SHUNT SERIES  
2023 11:02 am  
COMPARISON:  
None.  
HISTORY:  
ORDERING SYSTEM   
PROVIDED HISTORY:   
Evaluate shunt after   
lap sukumar  
TECHNOLOGIST PROVIDED   
HISTORY:  
Evaluate shunt after   
lap sukumar  
Reason for Exam: eval   
shunt post lap sukumar  
FINDINGS:  
Right occipital shunt   
catheter is present   
extending from the   
right neck into  
the right chest wall   
and abdomen   
terminating in the   
left lower quadrant of  
the pelvis.  
No evidence of bowel   
obstruction. No   
evidence of shunt   
discontinuity or  
focal kink.  
IMPRESSION:  
Shunt catheter as   
described terminating   
in the left lower   
quadrant of the  
pelvis.  
Interpreted by:  
Adarsh Kirkland MD  
Signed by:  
Adarsh Kirkland MD  
12/3/23  
Final result        Normal                                  [facility]  
   
                                                    Cult,Bloodon 2023   
   
                                        Cult,Blood          Specimen Description  
   
.BLOOD  
Special Requests  
Culture NO GROWTH 5   
DAYS  
Report Status FINAL   
2023          Normal                                  [facility]  
   
                                        Comment on above:   Performed By: #### C  
MPX ####  
[employer]  
[facility] [place], [place] [id number] [phone]  
: John Ahumada MD   
   
                                                            Performed By: #### B  
C ####  
[employer]  
[address] [place], [place] [address] [phone]  
: John Ahumada MD   
   
                                        Cult,Blood          Specimen Description  
   
.BLOOD  
Special Requests  
Culture NO GROWTH 5   
DAYS  
Report Status FINAL   
2023          Normal                                  [facility]  
   
                                        Comment on above:   Performed By: #### B  
C ####  
[address] [address]  
[phone]  
: John Ahumada MD   
   
                                                    Basic Metab w/rfx MGon    
   
                                                    Creatinine   
[Mass/Vol]      0.7 mg/dL       Normal          0.7-1.2         [facility]  
   
                                        Comment on above:   Result Comment: ICTE  
[place] [facility]   
   
                                                            Performed By: #### C  
MPX ####  
[address] [address]  
[phone]  
: John Ahumada MD   
   
                                                            Performed By: #### B  
C ####  
[address] [address]  
[phone]  
: John Ahumada MD   
   
                                                    GFR/1.73 sq   
M.predicted among   
non-blacks MDRD   
(S/P/Bld) [Vol   
rate/Area]      mL/min/{1.73_m2} Normal          >60             [facility]  
   
                                        Comment on above:   Result Comment:  
These results are not intended for use in patients <18 years of   
age.  
eGFR results are calculated without a race factor using the    
CKD-EPI  
equation.  
Careful clinical correlation is recommended, particularly when   
comparing to  
results calculated using previous equations.  
The CKD-EPI equation is less accurate in patients with extremes of   
muscle mass,  
extra-renal metabolism of creatine, excessive creatine ingestion,   
or following  
therapy that affects renal tubular secretion.   
   
                                                            Performed By: #### C  
MPX ####  
[address] [address]  
[phone]  
: John Ahumada MD   
   
                                                            Performed By: #### B  
C ####  
[facility]  
[address] [place] [address]  
[phone]  
: John Ahumada MD   
   
                                                    Anion gap   
[Moles/Vol]     8 mmol/L        Low             9-17            [facility]  
   
                                        Comment on above:   Performed By: #### C  
MPX ####  
[address] [address]  
[phone]  
: John Ahumada MD   
   
                                                            Performed By: #### B  
C ####  
[address] [address]  
[phone]  
: John Ahumada MD   
   
                      Calcium [Mass/Vol] 9.0 mg/dL  Normal     8.6-10.4   [facility]  
   
                                        Comment on above:   Performed By: #### C  
MPX ####  
[address] [address]  
[phone]  
: John Ahumada MD   
   
                                                            Performed By: #### B  
C ####  
[address] [address]  
[phone]  
: John Ahumada MD   
   
                                                    Chloride   
[Moles/Vol]     103 mmol/L      Normal                    [facility]  
   
                                        Comment on above:   Performed By: #### C  
MPX ####  
[address] [address]  
[phone]  
: John Ahumada MD   
   
                                                            Performed By: #### B  
C ####  
[address] [address]  
[phone]  
: John Ahumada MD   
   
                      CO2 [Moles/Vol] 24 mmol/L  Normal     20-31      [facility]  
   
                                        Comment on above:   Performed By: #### C  
MPX ####  
[address] [address]  
[phone]  
: John Ahumada MD   
   
                                                            Performed By: #### B  
C ####  
[address] [address]  
[phone]  
: John Ahumada MD   
   
                      Glucose [Mass/Vol] 111 mg/dL  High       70-99      [facility]  
   
                                        Comment on above:   Performed By: #### C  
MPX ####  
[address] [address]  
[phone]  
: John Ahumada MD   
   
                                                            Performed By: #### B  
C ####  
[address] [address]  
[phone]  
: John Ahumada MD   
   
                                                    Potassium   
[Moles/Vol]     4.5 mmol/L      Normal          3.7-5.3         [facility]  
   
                                        Comment on above:   Performed By: #### C  
MPX ####  
[address] [address]  
[phone]  
: John Ahumada MD   
   
                                                            Performed By: #### B  
C ####  
[address] [address]  
[phone]  
: John Ahumada MD   
   
                      Sodium [Moles/Vol] 135 mmol/L Normal     135-144    [facility]  
   
                                        Comment on above:   Performed By: #### C  
MPX ####  
[address] [address]  
[phone]  
: John Ahumada MD   
   
                                                            Performed By: #### B  
C ####  
[address] [address]  
[phone]  
: John Ahumada MD   
   
                                                    Urea nitrogen   
[Mass/Vol]      14 mg/dL        Normal          8-23            [facility]  
   
                                        Comment on above:   Performed By: #### C  
MPX ####  
[address] [address]  
[phone]  
: John Ahumada MD   
   
                                                            Performed By: #### B  
C ####  
[address] [address]  
[phone]  
: John Ahumada MD   
   
                                                    CBC with Diffon 2023   
   
                      Abs. Basophil 0.05 k/uL  Normal     0.00-0.20  [facility]  
   
                                        Comment on above:   Performed By: #### C  
DP, REJEC ####  
[address] [address]  
[phone]  
: John Ahumada MD   
   
                      Abs. Eosinophil <0.03      Normal     0.00-0.44  [facility]  
   
                                        Comment on above:   Performed By: #### C  
DP, REJEC ####  
[address] [address]  
[phone]  
: John Ahumada MD   
   
                      Abs.Imm.Granulocyte 0.10 k/uL  Normal     0.00-0.30  [facility]  
   
                                        Comment on above:   Performed By: #### C  
DP, REJEC ####  
[address] [address]  
[phone]  
: John Ahumada MD   
   
                                                    Abs.Neutrophil   
(Seg)           9.29 k/uL       High            1.50-8.10       [facility]  
   
                                        Comment on above:   Performed By: #### C  
DP, REJEC ####  
[address] [address]  
[phone]  
: John Ahumada MD   
   
                                                    Basophils/100 WBC   
(Bld)           0 %             Normal          0-2             [facility]  
   
                                        Comment on above:   Performed By: #### C  
DP, REJEC ####  
[address] [address]  
[phone]  
: John Ahumada MD   
   
                                                    Eosinophils/100 WBC   
(Bld)           0 %             Low             1-4             [facility]  
   
                                        Comment on above:   Performed By: #### C  
DP, REJEC ####  
[address] [address]  
[phone]  
: John Ahumada MD   
   
                                                    Erythrocyte   
distribution width   
(RBC) [Ratio]   15.1 %          High            11.8-14.4       [facility]  
   
                                        Comment on above:   Performed By: #### C  
DP, REJEC ####  
[address] [address]  
[phone]  
: John Ahumada MD   
   
                                                    Hematocrit (Bld)   
[Volume fraction] 48.7 %          Normal          40.7-50.3       [facility]  
   
                                        Comment on above:   Performed By: #### C  
DP, REJEC ####  
[address] [address]  
[phone]  
: John Ahumada MD   
   
                                                    Hemoglobin (Bld)   
[Mass/Vol]      15.6 g/dL       Normal          13.0-17.0       [facility]  
   
                                        Comment on above:   Performed By: #### C  
DP, REJEC ####  
[address] [address]  
[phone]  
: John Ahumada MD   
   
                                                    Immature   
granulocytes/100   
WBC (Bld)       1 %             High            0               [facility]  
   
                                        Comment on above:   Performed By: #### C  
DP, REJEC ####  
[address] [address]  
[phone]  
: John Ahumada MD   
   
                                                    Lymphocytes (Bld)   
[#/Vol]         1.27 10*3/uL    Normal          1.10-3.70       [facility]  
   
                                        Comment on above:   Performed By: #### C  
DP, REJEC ####  
[address] [address]  
[phone]  
: John Ahumada MD   
   
                                                    Lymphocytes/100 WBC   
(Bld)           11 %            Low             24-43           [facility]  
   
                                        Comment on above:   Performed By: #### C  
DP, REJEC ####  
[place] [address]  
[phone]  
: John Ahumada MD   
   
                                                    MCH (RBC) [Entitic   
mass]           30.3 pg         Normal          25.2-33.5       [facility]  
   
                                        Comment on above:   Performed By: #### C  
DP, REJEC ####  
[address] [address]  
[phone]  
: John Ahumada MD   
   
                                                    MCHC (RBC)   
[Mass/Vol]      32.0 g/dL       Normal          28.4-34.8       [facility]  
   
                                        Comment on above:   Performed By: #### C  
DP, REJEC ####  
[address] [address]  
[phone]  
: John Ahumada MD   
   
                                                    MCV (RBC) [Entitic   
vol]            94.6 fL         Normal          82.6-102.9      [facility]  
   
                                        Comment on above:   Performed By: #### C  
DP, REJEC ####  
[address] [address]  
[phone]  
: John Ahumada MD   
   
                                                    Monocytes (Bld)   
[#/Vol]         0.55 10*3/uL    Normal          0.10-1.20       [facility]  
   
                                        Comment on above:   Performed By: #### C  
DP, REJEC ####  
[address] [address]  
[phone]  
: John Ahumada MD   
   
                                                    Monocytes/100 WBC   
(Bld)           5 %             Normal          3-12            [facility]  
   
                                        Comment on above:   Performed By: #### C  
DP, REJEC ####  
[address] [address]  
[phone]  
: John Ahumada MD   
   
                      Neutrophil (Seg) 83 %       High       36-65      [facility]  
   
                                        Comment on above:   Performed By: #### C  
DP, REJEC ####  
[address] [address]  
[phone]  
: John Ahumada MD   
   
                      NRBC Automated 0.0 per 100 WBC Normal     0.0        [facility]  
   
                                        Comment on above:   Performed By: #### C  
DP, REJEC ####  
[address] [address]  
[phone]  
: John Ahumada MD   
   
                                                    Platelet mean   
volume (Bld)   
[Entitic vol]   10.9 fL         Normal          8.1-13.5        [facility]  
   
                                        Comment on above:   Performed By: #### C  
DP, REJEC ####  
[address] [address]  
[phone]  
: John Ahumada MD   
   
                                                    Platelets (Bld)   
[#/Vol]         352 10*3/uL     Normal          138-453         [facility]  
   
                                        Comment on above:   Performed By: #### C  
DP, REJEC ####  
[facility]  
[address] [place] [address]  
[phone]  
: John Ahumada MD   
   
                      RBC (Bld) [#/Vol] 5.15 10*6/uL Normal     4.21-5.77  [facility]  
   
                                        Comment on above:   Performed By: #### C  
DP, REJEC ####  
[facility]  
[address] [place] [address]  
[phone]  
: John Ahumada MD   
   
                                                    RBC morphology   
finding Nom (Bld) ANISOCYTOSIS PRESENT Normal                          [facility]  
   
                                        Comment on above:   Performed By: #### C  
DP, REJEC ####  
[facility]  
[address] [place] [address]  
[phone]  
: John Ahumada MD   
   
                      WBC (Bld) [#/Vol] 11.3 10*3/uL Normal     3.5-11.3   [facility]  
   
                                        Comment on above:   Performed By: #### C  
DP, REJEC ####  
[facility]  
[address] [place] [address]  
[phone]  
: John Ahumada MD   
   
                                                    Liver Profileon 2023   
   
                      Albumin [Mass/Vol] 3.3 g/dL   Low        3.5-5.2    [facility]  
   
                                        Comment on above:   Performed By: #### C  
MPX ####  
[facility]  
[address] [place] [address]  
[phone]  
: John Ahumada MD   
   
                                                            Performed By: #### B  
C ####  
[facility]  
[address] [place] [address]  
[phone]  
: John Ahumada MD   
   
                      Albumin/Glob Ratio 1.1        Normal     1.0-2.5    [facility]  
   
                                        Comment on above:   Performed By: #### C  
MPX ####  
[address] [address]  
[phone]  
: John Ahumada MD   
   
                                                            Performed By: #### B  
C ####  
[address] [address]  
[phone]  
: John Ahumada MD   
   
                      Alkaline Phos 208 U/L    High            [facility]  
   
                                        Comment on above:   Performed By: #### C  
MPX ####  
[address] [address]  
[phone]  
: John Ahumada MD   
   
                                                            Performed By: #### B  
C ####  
[address] [address]  
[phone]  
: John Ahumada MD   
   
                                                    ALT [Catalytic   
activity/Vol]   40 U/L          Normal          5-41            [facility]  
   
                                        Comment on above:   Performed By: #### C  
MPX ####  
[address] [address]  
[phone]  
: John Ahumada MD   
   
                                                            Performed By: #### B  
C ####  
[address] [address]  
[phone]  
: John Ahumada MD   
   
                                                    AST [Catalytic   
activity/Vol]   30 U/L          Normal          <40             [facility]  
   
                                        Comment on above:   Performed By: #### C  
MPX ####  
[address] [address]  
[phone]  
: John Ahumada MD   
   
                                                            Performed By: #### B  
C ####  
[address] [address]  
[phone]  
: John Ahumada MD   
   
                                                    Bilirubin   
[Mass/Vol]      3.4 mg/dL       High            0.3-1.2         [facility]  
   
                                        Comment on above:   Performed By: #### C  
MPX ####  
[address] [address]  
[phone]  
: John Ahumada MD   
   
                                                            Performed By: #### B  
C ####  
[address] [address]  
[phone]  
: John Ahumada MD   
   
                      Bilirubin, Indirect 1.3 mg/dL  High       0.0-1.0    [facility]  
   
                                        Comment on above:   Performed By: #### C  
MPX ####  
[address] [address]  
[phone]  
: John Ahumada MD   
   
                                                            Performed By: #### B  
C ####  
[address] [address]  
[phone]  
: John Ahumada MD   
   
                                                    Bilirubin.indirect   
[Mass/Vol]      2.1 mg/dL       High            <0.3            [facility]  
   
                                        Comment on above:   Performed By: #### C  
MPX ####  
[address] [address]  
[phone]  
: John Ahumada MD   
   
                                                            Performed By: #### B  
C ####  
[address] [address]  
[phone]  
: John Ahumada MD   
   
                      Protein [Mass/Vol] 6.3 g/dL   Low        6.4-8.3    [facility]  
   
                                        Comment on above:   Performed By: #### C  
MPX ####  
[address] [address]  
[phone]  
: John Ahumada MD   
   
                                                            Performed By: #### B  
C ####  
[address] [address]  
[phone]  
: Jhon Ahumada MD   
   
                                                    Specimen Rejectionon   
023   
   
                                                    Reason for   
rejection                               Unable to perform   
testing: Specimen   
hemolyzed.          Normal                                  [facility]  
   
                                        Comment on above:   Performed By: #### C  
DP, REJEC ####  
[address] [address]  
[phone]  
: John Ahumada MD   
   
                      Source of sample .BLOOD     Normal                [facility]  
   
                                        Comment on above:   Performed By: #### C  
DP, REJEC ####  
[employer] [facility]  
[address] [place] [phone]  
: John Ahumada MD   
   
                      Test ordered BMPX,LIVP  Normal                [facility]  
   
                                        Comment on above:   Performed By: #### C  
DP, REJEC ####  
[employer]  
[address] [place] [address] [phone]  
: John Ahumada MD   
   
                                                    Surgical Pathology Reporton   
2023   
   
                                                    Surgical Pathology   
Report                                  (NOTE)  
Path Number:   
[id number]  
-- Diagnosis --  
GALLBLADDER,   
CHOLECYSTECTOMY:-CHRON  
IC CHOLECYSTITIS WITH  
CHOLELITHIASIS.  
Olga Nguyen  
**Electronically   
Signed Out**  
ag/2023  
Clinical Information  
Pre-op Diagnosis:   
CALCULUS OF   
GALLBLADDER WITH ACUTE   
CHOLECYSTITIS  
AND OBSTRUCTION  
Operative Findings:   
GALLBLADDER  
Operation Performed:   
CHOLECYSTECTOMY   
LAPAROSCOPIC, POSSIBLE   
OPEN  
kb  
Source of Specimen  
A: GALLBLADDER  
Gross Description  
 GEM JAFFE  Received   
in formalin is an 8.8   
x 4.9 x  
4.8 cm intact   
gallbladder with a 1.0   
cm long x 1.1 cm in   
diameter  
cystic duct. The   
serosa is pink-tan and   
dusky, and the liver   
bed is  
coarse tan-brown. The   
wall is 0.1 cm in   
thickness, and the   
lumen  
contains approximately   
30 cc of thin brown   
bile with bosselated   
yellow  
calculi, 4.5 x 2.0 x   
1.5 cm in aggregate.   
The mucosa is tan,  
flattened and faintly   
trabecular. Cystic   
duct margin and   
sections of  
gallbladder mucosa   
1cs. tm  
LRD/kb2:2023  
Microscopic   
Description  
Microscopic   
examination performed.  
Processing Lab: [address] [address]  
Interpretation   
Performed at [address]   
[id number]  
SURGICAL PATHOLOGY   
CONSULTATION  
Patient Name:   
TAWANDA VILLARREAL  
[facility] Rec: [id number]  
[facility]  
CONSULTING   
PATHOLOGISTS   
[facility]  
ANATOMIC PATHOLOGY  
[address] [place] [address]   
[id number] [phone]  
Fax: [phone] [facility]   
[facility]  
   
                                                    XR SHUNT SERIES PLACEMENT (<  
4 VIEWS)on 2023   
   
                                                    XR SHUNT SERIES   
PLACEMENT (<4   
VIEWS)                                  EXAMINATION:  
SHUNT SERIES  
2023 11:02 am  
COMPARISON:  
None.  
HISTORY:  
ORDERING SYSTEM   
PROVIDED HISTORY:   
Evaluate shunt after   
lap sukumar  
TECHNOLOGIST PROVIDED   
HISTORY:  
Evaluate shunt after   
lap sukumar  
Reason for Exam: eval   
shunt post lap sukumar  
FINDINGS:  
Right occipital shunt   
catheter is present   
extending from the   
right neck into  
the right chest wall   
and abdomen   
terminating in the   
left lower quadrant of  
the pelvis.  
No evidence of bowel   
obstruction. No   
evidence of shunt   
discontinuity or  
focal kink.  
IMPRESSION:  
Shunt catheter as   
described terminating   
in the left lower   
quadrant of the  
pelvis.  
Interpreted by:  
Adarsh Kirkland MD  
Preliminary result  Normal                                  [facility]  
   
                                                    Basic Metabolic Profon    
   
                                                    Anion gap   
[Moles/Vol]     13 mmol/L       Normal          -17            [facility]  
   
                                        Comment on above:   Performed By: #### B  
C ####  
[address] [address]  
[phone]  
: John Ahumada MD   
   
                                                            Performed By: #### C  
DONA, REJEC ####  
[facility]  
[address] [place] [address]  
[phone]  
: John Ahumada MD   
   
                      Calcium [Mass/Vol] 9.1 mg/dL  Normal     8.6-10.4   [facility]  
   
                                        Comment on above:   Performed By: #### B  
C ####  
[facility]  
[address] [place] [address]  
[phone]  
: John Ahumada MD   
   
                                                            Performed By: #### C  
DP, REJEC ####  
[facility]  
[address] [place] [address]  
[phone]  
: John Ahumada MD   
   
                                                    Chloride   
[Moles/Vol]     103 mmol/L      Normal                    [facility]  
   
                                        Comment on above:   Performed By: #### B  
C ####  
[facility]  
[address] [place] [address]  
[phone]  
: John Ahumada MD   
   
                                                            Performed By: #### C  
DP, REJEC ####  
[address] [address]  
[phone]  
: John Ahumada MD   
   
                      CO2 [Moles/Vol] 22 mmol/L  Normal     20-31      [facility]  
   
                                        Comment on above:   Performed By: #### B  
C ####  
[address] [address]  
[phone]  
: John Ahumada MD   
   
                                                            Performed By: #### C  
DP, REJEC ####  
[address] [address]  
[phone]  
: John Ahumada MD   
   
                                                    Creatinine   
[Mass/Vol]      0.7 mg/dL       Normal          0.7-1.2         [facility]  
   
                                        Comment on above:   Result Comment: [facility]  
[facility]   
   
                                                            Performed By: #### B  
C ####  
[address] [address]  
[phone]  
: John Ahumada MD   
   
                                                            Performed By: #### C  
DP, REJEC ####  
[address] [address]  
[phone]  
: John Ahumada MD   
   
                                                    GFR/1.73 sq   
M.predicted among   
non-blacks MDRD   
(S/P/Bld) [Vol   
rate/Area]      mL/min/{1.73_m2} Normal          >60             [facility]  
   
                                        Comment on above:   Result Comment:  
These results are not intended for use in patients <18 years of   
age.  
eGFR results are calculated without a race factor using the    
CKD-EPI  
equation.  
Careful clinical correlation is recommended, particularly when   
comparing to  
results calculated using previous equations.  
The CKD-EPI equation is less accurate in patients with extremes of   
muscle mass,  
extra-renal metabolism of creatine, excessive creatine ingestion,   
or following  
therapy that affects renal tubular secretion.   
   
                                                            Performed By: #### B  
C ####  
[facility]  
[address] [place] [address]  
[phone]  
: John Ahumada MD   
   
                                                            Performed By: #### C  
DP, REJEC ####  
[address] [address]  
[phone]  
: John Ahumada MD   
   
                      Glucose [Mass/Vol] 94 mg/dL   Normal     70-99      [facility]  
   
                                        Comment on above:   Performed By: #### B  
C ####  
[address] [address]  
[phone]  
: John Ahumada MD   
   
                                                            Performed By: #### C  
DP, REJEC ####  
[address] [address]  
[phone]  
: John Ahumada MD   
   
                                                    Potassium   
[Moles/Vol]     4.2 mmol/L      Normal          3.7-5.3         [facility]  
   
                                        Comment on above:   Performed By: #### B  
C ####  
[address] [address]  
[phone]  
: John Ahumada MD   
   
                                                            Performed By: #### C  
DP, REJEC ####  
[address] [address]  
[phone]  
: John Ahumada MD   
   
                      Sodium [Moles/Vol] 138 mmol/L Normal     135-144    [facility]  
   
                                        Comment on above:   Performed By: #### B  
C ####  
[address] [address]  
[phone]  
: John Ahumada MD   
   
                                                            Performed By: #### C  
DP, REJEC ####  
[address] [address]  
[phone]  
: John Ahumada MD   
   
                                                    Urea nitrogen   
[Mass/Vol]      17 mg/dL        Normal          8-23            [facility]  
   
                                        Comment on above:   Performed By: #### B  
C ####  
[address] [address]  
[phone]  
: John Ahumada MD   
   
                                                            Performed By: #### C  
DP, REJEC ####  
[address] [address]  
[phone]  
: John Ahumada MD   
   
                                                    CBC with Diffon 2023   
   
                      Abs. Basophil 0.03 k/uL  Normal     0.00-0.20  [facility]  
   
                                        Comment on above:   Performed By: #### B  
C ####  
[address] [address]  
[phone]  
: John Ahumada MD   
   
                                                            Performed By: #### C  
DP, REJEC ####  
[address] [address]  
[phone]  
: John Ahumada MD   
   
                      Abs.Imm.Granulocyte 0.06 k/uL  Normal     0.00-0.30  [facility]  
   
                                        Comment on above:   Performed By: #### B  
C ####  
[address] [address]  
[phone]  
: John Ahumada MD   
   
                                                            Performed By: #### C  
DP, REJEC ####  
[address] [address]  
[phone]  
: John Ahumada MD   
   
                                                    Abs.Neutrophil   
(Seg)           6.84 k/uL       Normal          1.50-8.10       [facility]  
   
                                        Comment on above:   Performed By: #### B  
C ####  
[address] [address]  
[phone]  
: John Ahumada MD   
   
                                                            Performed By: #### C  
DP, REJEC ####  
[address] [address]  
[phone]  
: John Ahumada MD   
   
                                                    Basophils/100 WBC   
(Bld)           0 %             Normal          0-2             [facility]  
   
                                        Comment on above:   Performed By: #### B  
C ####  
[address] [address]  
[phone]  
: John Ahumada MD   
   
                                                            Performed By: #### C  
DP, REJEC ####  
[address] [address]  
[phone]  
: John Ahumada MD   
   
                                                    Eosinophils (Bld)   
[#/Vol]         0.05 10*3/uL    Normal          0.00-0.44       [facility]  
   
                                        Comment on above:   Performed By: #### B  
C ####  
[address] [address]  
[phone]  
: John Ahumada MD   
   
                                                            Performed By: #### C  
DP, REJEC ####  
[address] [address]  
[phone]  
: John Ahumada MD   
   
                                                    Eosinophils/100 WBC   
(Bld)           1 %             Normal          1-4             [facility]  
   
                                        Comment on above:   Performed By: #### B  
C ####  
[address] [address]  
[phone]  
: John Ahumada MD   
   
                                                            Performed By: #### C  
DP, REJEC ####  
[address] [address]  
[phone]  
: John Ahumada MD   
   
                                                    Erythrocyte   
distribution width   
(RBC) [Ratio]   14.8 %          High            11.8-14.4       [facility]  
   
                                        Comment on above:   Performed By: #### B  
C ####  
[address] [address]  
[phone]  
: John Ahumada MD   
   
                                                            Performed By: #### C  
DP, REJEC ####  
[address] [address]  
[phone]  
: John Ahumada MD   
   
                                                    Hematocrit (Bld)   
[Volume fraction] 42.1 %          Normal          40.7-50.3       [facility]  
   
                                        Comment on above:   Performed By: #### B  
C ####  
[address] [address]  
[phone]  
: John Ahumada MD   
   
                                                            Performed By: #### C  
DP, REJEC ####  
[address] [place] [address]  
[phone]  
: John Ahumada MD   
   
                                                    Hemoglobin (Bld)   
[Mass/Vol]      14.1 g/dL       Normal          13.0-17.0       [facility]  
   
                                        Comment on above:   Performed By: #### B  
C ####  
[address] [address]  
[phone]  
: John Ahumada MD   
   
                                                            Performed By: #### C  
DP, REJEC ####  
[address] [address]  
[phone]  
: John Ahumada MD   
   
                                                    Immature   
granulocytes/100   
WBC (Bld)       1 %             High            0               [facility]  
   
                                        Comment on above:   Performed By: #### B  
C ####  
[address] [address]  
[phone]  
: John Ahumada MD   
   
                                                            Performed By: #### C  
DP, REJEC ####  
[address] [address]  
[phone]  
: John Ahumada MD   
   
                                                    Lymphocytes (Bld)   
[#/Vol]         1.69 10*3/uL    Normal          1.10-3.70       [facility]  
   
                                        Comment on above:   Performed By: #### B  
C ####  
[address] [address]  
[phone]  
: John Ahumada MD   
   
                                                            Performed By: #### C  
DP, REJEC ####  
[address] [address]  
[phone]  
: John Ahumada MD   
   
                                                    Lymphocytes/100 WBC   
(Bld)           18 %            Low             24-43           [facility]  
   
                                        Comment on above:   Performed By: #### B  
C ####  
[address] [address]  
[phone]  
: John Ahumada MD   
   
                                                            Performed By: #### C  
DP, REJEC ####  
[address] [address]  
[phone]  
: John Ahumada MD   
   
                                                    MCH (RBC) [Entitic   
mass]           30.4 pg         Normal          25.2-33.5       [facility]  
   
                                        Comment on above:   Performed By: #### B  
C ####  
[address] [address]  
[phone]  
: John Ahumada MD   
   
                                                            Performed By: #### C  
DP, REJEC ####  
[address] [address]  
[phone]  
: John Ahumada MD   
   
                                                    MCHC (RBC)   
[Mass/Vol]      33.5 g/dL       Normal          28.4-34.8       [facility]  
   
                                        Comment on above:   Performed By: #### B  
C ####  
[address] [address]  
[phone]  
: John Ahumada MD   
   
                                                            Performed By: #### C  
DP, REJEC ####  
[address] [address]  
[phone]  
: John Ahumada MD   
   
                                                    MCV (RBC) [Entitic   
vol]            90.7 fL         Normal          82.6-102.9      [facility]  
   
                                        Comment on above:   Performed By: #### B  
C ####  
[address] [address]  
[phone]  
: John Ahumada MD   
   
                                                            Performed By: #### C  
DP, REJEC ####  
[address] [address]  
[phone]  
: John Ahumada MD   
   
                                                    Monocytes (Bld)   
[#/Vol]         0.84 10*3/uL    Normal          0.10-1.20       [facility]  
   
                                        Comment on above:   Performed By: #### B  
C ####  
[address] [address]  
[phone]  
: John Ahumada MD   
   
                                                            Performed By: #### C  
DP, REJEC ####  
[address] [address]  
[phone]  
: John Ahumada MD   
   
                                                    Monocytes/100 WBC   
(Bld)           9 %             Normal          3-12            [facility]  
   
                                        Comment on above:   Performed By: #### B  
C ####  
[address] [address]  
[phone]  
: John Ahumada MD   
   
                                                            Performed By: #### C  
DP, REJEC ####  
[address] [address]  
[phone]  
: John Ahumada MD   
   
                      Neutrophil (Seg) 71 %       High       36-65      [facility]  
   
                                        Comment on above:   Performed By: #### B  
C ####  
[address] [address]  
[phone]  
: John Ahumada MD   
   
                                                            Performed By: #### C  
DP, REJEC ####  
[address] [address]  
[phone]  
: John Ahumada MD   
   
                      NRBC Automated 0.0 per 100 WBC Normal     0.0        [facility]  
   
                                        Comment on above:   Performed By: #### B  
C ####  
[address] [address]  
[phone]  
: John Ahumada MD   
   
                                                            Performed By: #### C  
DP, REJEC ####  
[address] [address]  
[phone]  
: John Ahumada MD   
   
                                                    Platelet mean   
volume (Bld)   
[Entitic vol]   11.3 fL         Normal          8.1-13.5        [facility]  
   
                                        Comment on above:   Performed By: #### B  
C ####  
[address] [address]  
[phone]  
: John Ahumada MD   
   
                                                            Performed By: #### C  
DP, REJEC ####  
[address] [address]  
[phone]  
: John Ahumada MD   
   
                                                    Platelets (Bld)   
[#/Vol]         283 10*3/uL     Normal          138-453         [facility]  
   
                                        Comment on above:   Performed By: #### B  
C ####  
[address] [address]  
[phone]  
: John Ahumada MD   
   
                                                            Performed By: #### C  
DP, REJEC ####  
[address] [address]  
[phone]  
: John Ahumada MD   
   
                      RBC (Bld) [#/Vol] 4.64 10*6/uL Normal     4.21-5.77  [facility]  
   
                                        Comment on above:   Performed By: #### B  
C ####  
[address] [address]  
[phone]  
: John Ahumada MD   
   
                                                            Performed By: #### C  
DP, REJEC ####  
[address] [address]  
[phone]  
: John Ahumada MD   
   
                                                    RBC morphology   
finding Nom (Bld) ANISOCYTOSIS PRESENT Normal                          [facility]  
   
                                        Comment on above:   Performed By: #### B  
C ####  
[address] [address]  
[phone]  
: John Ahumada MD   
   
                                                            Performed By: #### C  
DP, REJEC ####  
[address] [address]  
[phone]  
: John Ahumada MD   
   
                      WBC (Bld) [#/Vol] 9.5 10*3/uL Normal     3.5-11.3   [facility]  
   
                                        Comment on above:   Performed By: #### B  
C ####  
[address] [address]  
[phone]  
: John Ahumada MD   
   
                                                            Performed By: #### C  
DP, REJEC ####  
[address] [address]  
[phone]  
: John Ahumada MD   
   
                                                    Extra Lavender Tubeon 2023   
   
                      Extra Lavender Tube            Normal                [facility]  
   
                                        Comment on above:   Performed By: #### B  
C ####  
[address] [address]  
[phone]  
: John Ahumada MD   
   
                                                            Performed By: #### C  
DP, REJEC ####  
[address] [address]  
[phone]  
: John Ahumada MD   
   
                                                    Liver Profileon 2023   
   
                      Albumin [Mass/Vol] 3.2 g/dL   Low        3.5-5.2    [facility]  
   
                                        Comment on above:   Performed By: #### B  
C ####  
[address] [address]  
[phone]  
: John Ahumada MD   
   
                                                            Performed By: #### C  
DP, REJEC ####  
[address] [address]  
[phone]  
: John Ahumada MD   
   
                      Albumin/Glob Ratio 1.1        Normal     1.0-2.5    [facility]  
   
                                        Comment on above:   Performed By: #### B  
C ####  
[address] [address]  
[phone]  
: John Ahumada MD   
   
                                                            Performed By: #### C  
DP, REJEC ####  
[address] [address]  
[phone]  
: Jonh Ahumada MD   
   
                      Alkaline Phos 238 U/L    High            [facility]  
   
                                        Comment on above:   Performed By: #### B  
C ####  
[address] [address]  
[phone]  
: John Ahumada MD   
   
                                                            Performed By: #### C  
DP, REJEC ####  
[address] [address]  
[phone]  
: John Ahumada MD   
   
                                                    ALT [Catalytic   
activity/Vol]   43 U/L          High            5-41            [facility]  
   
                                        Comment on above:   Performed By: #### B  
C ####  
[address] [address]  
[phone]  
: John Ahumada MD   
   
                                                            Performed By: #### C  
DP, REJEC ####  
[address] [address]  
[phone]  
: John Ahumada MD   
   
                                                    AST [Catalytic   
activity/Vol]   26 U/L          Normal          <40             [facility]  
   
                                        Comment on above:   Performed By: #### B  
C ####  
[address] [address]  
[phone]  
: John Ahumada MD   
   
                                                            Performed By: #### C  
DP, REJEC ####  
[address] [address]  
[phone]  
: John Ahumada MD   
   
                                                    Bilirubin   
[Mass/Vol]      3.5 mg/dL       High            0.3-1.2         [facility]  
   
                                        Comment on above:   Performed By: #### B  
C ####  
[address] [address]  
[phone]  
: John Ahumada MD   
   
                                                            Performed By: #### C  
DP, REJEC ####  
[address] [address]  
[phone]  
: John Ahumada MD   
   
                      Bilirubin, Indirect 1.1 mg/dL  High       0.0-1.0    [facility]  
   
                                        Comment on above:   Performed By: #### B  
C ####  
[address] [address]  
[phone]  
: John Ahumada MD   
   
                                                            Performed By: #### C  
DP, REJEC ####  
[address] [address]  
[phone]  
: John Ahumada MD   
   
                                                    Bilirubin.indirect   
[Mass/Vol]      2.4 mg/dL       High            <0.3            [facility]  
   
                                        Comment on above:   Performed By: #### B  
C ####  
[address] [address]  
[phone]  
: John Ahumada MD   
   
                                                            Performed By: #### C  
DP, REJEC ####  
[address] [address]  
[phone]  
: John Ahumada MD   
   
                      Protein [Mass/Vol] 6.2 g/dL   Low        6.4-8.3    [facility]  
   
                                        Comment on above:   Performed By: #### B  
C ####  
[address] [address]  
[phone]  
: John Ahumada MD   
   
                                                            Performed By: #### C  
DP, REJEC ####  
[address] [address]  
[phone]  
: John Ahumada MD   
   
                                                    Non-Gyn Cytologyon 11-  
3   
   
                      Case No:   [id number]    Normal                [facility]  
   
                                        Comment on above:   Performed By: #### N  
GYN ####  
[address] [address]  
[phone]  
: John Ahumada MD   
   
                                                            Performed By: #### B  
C ####  
[address] [address]  
[phone]  
: John Ahumada MD   
   
                                                    XR SKULL (<4 VIEWS)on 2023   
   
                                        XR SKULL (<4 VIEWS) EXAMINATION:  
THREE XRAY VIEWS OF   
THE SKULL  
2023 4:33 pm  
COMPARISON:  
2023  
HISTORY:  
ORDERING SYSTEM   
PROVIDED HISTORY:   
obtain view   
perpendicular to shunt   
valve to  
eval setting  
TECHNOLOGIST PROVIDED   
HISTORY:  
obtain view   
perpendicular to shunt   
valve to eval setting  
Reason for Exam: shunt   
setting  
FINDINGS:  
Parietal approach   
shunt catheter in   
place. No apparent   
discontinuity or  
kinking. Bowel appears   
to be set at 1.5.  
IMPRESSION:  
Valve appears to be   
set at 1.5.  
Interpreted by:  
Akbar Reilly MD  
Signed by:  
Akbar Reilly MD  
23  
Final result        Normal                                  [facility]  
   
                                                    ED Note-Physicianon 20   
   
                                        ED Note-Physician   [id number]  
10  
[id number]  
[id number]               Normal                                  [facility]   
[facility]  
   
                                                    FLUORO FOR SURGICAL PROCEDUR  
ESon 2023   
   
                                                    FLUORO FOR SURGICAL   
PROCEDURES                              Radiology exam is   
complete. No   
Radiologist dictation.   
Please follow up with   
ordering provider.  
  
  
Final result        Normal                                  [facility]  
   
                                                    Surgical Pathology Reporton   
2023   
   
                                                    Surgical Pathology   
Report                                  (NOTE)  
Path Number:   
[id number]  
-- Diagnosis --  
A. Stomach, endoscopic   
biopsy:  
Mild chronic inactive   
gastritis, negative   
for Helicobacter   
pylori.  
Neither intestinal   
metaplasia nor   
dysplasia is seen.  
B. Small intestine,   
duodenum, endoscopic   
biopsy:  
Normal small   
intestinal mucosa.  
LY Dumont.  
**Electronically   
Signed Out**  
  
Clinical Information  
Pre-Op Diagnosis:   
OBSTRUCTIVE JAUNDICE  
Operative Findings:   
GASTRIC BX; DUODENAL   
BX  
Operation Performed:   
ERCP STONE REMOVAL;   
ENDOSCOPIC ULTRASOUND   
WITH  
PATHOLOGY; EGD BIOPSY  
mj  
Source of Specimen  
A: GASTRIC BX  
B: DUODENAL BX  
Gross Description  
A.  TAWANDA VILLARREAL,   
GASTRIC BX  Received   
in formalin are two  
pink-tan tissue   
fragments, 0.2 and 0.8   
cm and are 1.0 x 0.2 x   
0.2 cm  
in aggregate. Entirely   
1 cs.  
B.  TAWANDA VILLARREAL,   
DUODENAL BX  Received   
in formalin are two  
pink-tan tissue   
fragments, 0.3 cm each   
and are 0.6 x 0.2 x   
0.2 cm in  
aggregate. Entirely 1   
cs. jbrittanie mj  
RDD/mj:2023  
Microscopic   
Description  
A, B. 2 AMAIRANI reviewed   
for each. Microscopic   
examination performed.  
Processing Lab: [facility] [address] [place] [address]  
Interpretation   
Performed at   
[id number]   
[place] [address]  
SURGICAL PATHOLOGY   
CONSULTATION  
Patient Name:   
TAWANDA VILLARREAL  
[facility] Rec: [id number]  
[facility]  
[facility]   
[facility]  
ANATOMIC PATHOLOGY  
[address], [place]   
[address] [phone]  
Fax: [phone] Normal                                  [facility]  
   
                                                    Surgical Pathology   
Report                                  (NOTE)  
Path Number:   
[id number]  
INTERPRETATION  
FINE NEEDLE ASPIRATION   
EUS ROSA HEPATIS   
LYMPH NODE:  
- NEGATIVE FOR   
MALIGNANCY.  
  
**Electronically   
Signed Out**  
Olga Wendy  
ag/2023  
Source of Specimen:  
A: FINE NEEDLE   
ASPIRATION EUS ROSA   
HEPATIS LYMPH NODE  
Clinical History  
Obstructive jaundice   
K83.1.  
Gross Description  
 ROSA HEPATIS LYMPH   
NODE  Specimen   
received in CytoLyt   
solution,  
light red fluid.  
MICROSCOPIC   
DESCRIPTION  
Microscopic   
examination performed.  
Non Gyn Thin Prep x 1,   
Cell Block w/ AMAIRANI x   
1  
Processing Lab: [address] [address]  
Interpretation   
performed at [address]   
[address]  
NONGYNECOLOGICAL   
CYTOPATHOLOGY   
CONSULTATION  
Patient Name:   
TAWANDA VILLARREAL  
[facility] Rec: [id number]  
[facility]  
CONSULTING   
[facility]   
[facility]  
ANATOMIC PATHOLOGY  
[address] [place] [address]   
[address] [phone]  
Fax: [phone] Normal                                  [facility]  
   
                                                    US GI ENDOSCOPIC S AND Ion 1  
2023   
   
                                                    US GI ENDOSCOPIC S   
AND I                                   Radiology exam is   
complete. No   
Radiologist dictation.   
Please follow up with   
ordering provider.  
  
  
Final result        Normal                                  [facility]  
   
                                                    Anti-Mitochondrial Abon -   
   
                                                    Anti-Mitochondrial   
Ab              4.2 U/mL        High            0.0-4.0         [facility]  
   
                                        Comment on above:   Result Comment:  
Reference Range:  
<4.0 Negative  
4.0-6.0 Equivocal  
>6.0 Positive  
When results are Equivocal, it is recommended to retest after 8-12   
weeks.   
   
                                                            Performed By: #### C  
MPX ####  
[address] [address] [phone]  
: John Ahumada MD   
   
                                                            Performed By: #### A  
NCACP, AMAB ####  
[facility]  
[address] [place] [id number] [phone]  
: John Ahumada MD   
   
                                                    CBC with Diffon 2023   
   
                      Abs. Basophil 0.08 k/uL  Normal     0.00-0.20  [facility]  
   
                                        Comment on above:   Performed By: #### C  
MPX ####  
[address] [address]  
[phone]  
: John Ahumada MD   
   
                                                            Performed By: #### B  
C ####  
[address] [address]  
[phone]  
: John Ahumada MD   
   
                      Abs.Imm.Granulocyte 0.06 k/uL  Normal     0.00-0.30  [facility]  
   
                                        Comment on above:   Performed By: #### C  
MPX ####  
[address] [address]  
[phone]  
: John Ahumada MD   
   
                                                            Performed By: #### B  
C ####  
[place] [address]  
[phone]  
: John Ahumada MD   
   
                                                    Abs.Neutrophil   
(Seg)           3.77 k/uL       Normal          1.50-8.10       [facility]  
   
                                        Comment on above:   Performed By: #### C  
MPX ####  
[address] [address]  
[phone]  
: John Ahumada MD   
   
                                                            Performed By: #### B  
C ####  
[address] [address]  
[phone]  
: John Ahumada MD   
   
                                                    Basophils/100 WBC   
(Bld)           1 %             Normal          0-2             [facility]  
   
                                        Comment on above:   Performed By: #### C  
MPX ####  
[address] [address]  
[phone]  
: John Ahumada MD   
   
                                                            Performed By: #### B  
C ####  
[address] [address]  
[phone]  
: John Ahumada MD   
   
                                                    Eosinophils (Bld)   
[#/Vol]         0.18 10*3/uL    Normal          0.00-0.44       [facility]  
   
                                        Comment on above:   Performed By: #### C  
MPX ####  
[address] [address]  
[phone]  
: John Ahumada MD   
   
                                                            Performed By: #### B  
C ####  
[address] [address]  
[phone]  
: John Ahumada MD   
   
                                                    Eosinophils/100 WBC   
(Bld)           3 %             Normal          1-4             [facility]  
   
                                        Comment on above:   Performed By: #### C  
MPX ####  
[address] [address]  
[phone]  
: John Ahumada MD   
   
                                                            Performed By: #### B  
C ####  
[address] [address]  
[phone]  
: John Ahumada MD   
   
                                                    Erythrocyte   
distribution width   
(RBC) [Ratio]   15.9 %          High            11.8-14.4       [facility]  
   
                                        Comment on above:   Performed By: #### C  
MPX ####  
[address] [address]  
[phone]  
: John Ahumada MD   
   
                                                            Performed By: #### B  
C ####  
[address] [address]  
[phone]  
: John Ahumada MD   
   
                                                    Hematocrit (Bld)   
[Volume fraction] 43.3 %          Normal          40.7-50.3       [facility]  
   
                                        Comment on above:   Performed By: #### C  
MPX ####  
[address] [address]  
[phone]  
: John Ahumada MD   
   
                                                            Performed By: #### B  
C ####  
[address] [address]  
[phone]  
: John Ahumada MD   
   
                                                    Hemoglobin (Bld)   
[Mass/Vol]      13.7 g/dL       Normal          13.0-17.0       [facility]  
   
                                        Comment on above:   Performed By: #### C  
MPX ####  
[address] [address]  
[phone]  
: John Ahumada MD   
   
                                                            Performed By: #### B  
C ####  
[address] [address]  
[phone]  
: John Ahumada MD   
   
                                                    Immature   
granulocytes/100   
WBC (Bld)       1 %             High            0               [facility]  
   
                                        Comment on above:   Performed By: #### C  
MPX ####  
[address] [address]  
[phone]  
: John Ahumada MD   
   
                                                            Performed By: #### B  
C ####  
[address] [address]  
[phone]  
: John Ahumada MD   
   
                                                    Lymphocytes (Bld)   
[#/Vol]         1.84 10*3/uL    Normal          1.10-3.70       [facility]  
   
                                        Comment on above:   Performed By: #### C  
MPX ####  
[address] [address]  
[phone]  
: John Ahumada MD   
   
                                                            Performed By: #### B  
C ####  
[address] [address]  
[phone]  
: John Ahumada MD   
   
                                                    Lymphocytes/100 WBC   
(Bld)           28 %            Normal          24-43           [facility]  
   
                                        Comment on above:   Performed By: #### C  
MPX ####  
[address] [address]  
[phone]  
: John Ahumada MD   
   
                                                            Performed By: #### B  
C ####  
[address] [address]  
[phone]  
: John Ahumada MD   
   
                                                    MCH (RBC) [Entitic   
mass]           29.8 pg         Normal          25.2-33.5       [facility]  
   
                                        Comment on above:   Performed By: #### C  
MPX ####  
[address] [address]  
[phone]  
: John Ahumada MD   
   
                                                            Performed By: #### B  
C ####  
[address] [address]  
[phone]  
: John Ahumada MD   
   
                                                    MCHC (RBC)   
[Mass/Vol]      31.6 g/dL       Normal          28.4-34.8       [facility]  
   
                                        Comment on above:   Performed By: #### C  
MPX ####  
[address] [address]  
[phone]  
: John Ahumada MD   
   
                                                            Performed By: #### B  
C ####  
[address] [address]  
[phone]  
: John Ahumada MD   
   
                                                    MCV (RBC) [Entitic   
vol]            94.3 fL         Normal          82.6-102.9      [facility]  
   
                                        Comment on above:   Performed By: #### C  
MPX ####  
[address] [address]  
[phone]  
: John Ahumada MD   
   
                                                            Performed By: #### B  
C ####  
[address] [address]  
[phone]  
: John Ahumada MD   
   
                                                    Monocytes (Bld)   
[#/Vol]         0.66 10*3/uL    Normal          0.10-1.20       [facility]  
   
                                        Comment on above:   Performed By: #### C  
MPX ####  
[address] [address]  
[phone]  
: John Ahumada MD   
   
                                                            Performed By: #### B  
C ####  
[address] [address]  
[phone]  
: John Ahumada MD   
   
                                                    Monocytes/100 WBC   
(Bld)           10 %            Normal          3-12            [facility]  
   
                                        Comment on above:   Performed By: #### C  
MPX ####  
[address] [address]  
[phone]  
: John Ahumada MD   
   
                                                            Performed By: #### B  
C ####  
[address] [address]  
[phone]  
: John Ahumada MD   
   
                      Neutrophil (Seg) 57 %       Normal     36-65      [facility]  
   
                                        Comment on above:   Performed By: #### C  
MPX ####  
[address] [address]  
[phone]  
: John Ahumada MD   
   
                                                            Performed By: #### B  
C ####  
[address] [address]  
[phone]  
: John Ahumada MD   
   
                      NRBC Automated 0.0 per 100 WBC Normal     0.0        [facility]  
   
                                        Comment on above:   Performed By: #### C  
MPX ####  
[address] [address]  
[phone]  
: John Ahumada MD   
   
                                                            Performed By: #### B  
C ####  
[address] [address]  
[phone]  
: John Ahumada MD   
   
                                                    Platelet mean   
volume (Bld)   
[Entitic vol]   10.6 fL         Normal          8.1-13.5        [facility]  
   
                                        Comment on above:   Performed By: #### C  
MPX ####  
[address] [address]  
[phone]  
: John Ahumada MD   
   
                                                            Performed By: #### B  
C ####  
[address] [address]  
[phone]  
: John Ahumada MD   
   
                                                    Platelets (Bld)   
[#/Vol]         270 10*3/uL     Normal          138-453         [facility]  
   
                                        Comment on above:   Performed By: #### C  
MPX ####  
[address] [address]  
[phone]  
: John Ahumada MD   
   
                                                            Performed By: #### B  
C ####  
[address] [address]  
[phone]  
: John Ahumada MD   
   
                      RBC (Bld) [#/Vol] 4.59 10*6/uL Normal     4.21-5.77  [facility]  
   
                                        Comment on above:   Performed By: #### C  
MPX ####  
[address] [address]  
[phone]  
: John Ahumada MD   
   
                                                            Performed By: #### B  
C ####  
[address] [address]  
[phone]  
: John Ahumada MD   
   
                                                    RBC morphology   
finding Nom (Bld) ANISOCYTOSIS PRESENT Normal                          [facility]  
   
                                        Comment on above:   Performed By: #### C  
MPX ####  
[address] [address]  
[phone]  
: John Ahumada MD   
   
                                                            Performed By: #### B  
C ####  
[address] [address]  
[phone]  
: John Ahumada MD   
   
                      WBC (Bld) [#/Vol] 6.6 10*3/uL Normal     3.5-11.3   [facility]  
   
                                        Comment on above:   Performed By: #### C  
MPX ####  
[address] [address]  
[phone]  
: John Ahumada MD   
   
                                                            Performed By: #### B  
C ####  
[address] [address]  
[phone]  
: John Ahumada MD   
   
                                                    Comp Metabolic Profon 2023   
   
                                                    Creatinine   
[Mass/Vol]      0.7 mg/dL       Normal          0.7-1.2         [facility]  
   
                                        Comment on above:   Result Comment: ICTE  
[facility] [facility]   
   
                                                            Performed By: #### C  
MPX ####  
[address] [place] [address]  
[phone]  
: John Ahumada MD   
   
                                                            Performed By: #### B  
C ####  
[address] [address]  
[phone]  
: John Ahumada MD   
   
                                                    GFR/1.73 sq   
M.predicted among   
non-blacks MDRD   
(S/P/Bld) [Vol   
rate/Area]      mL/min/{1.73_m2} Normal          >60             [facility]  
   
                                        Comment on above:   Result Comment:  
These results are not intended for use in patients <18 years of   
age.  
eGFR results are calculated without a race factor using the    
CKD-EPI  
equation.  
Careful clinical correlation is recommended, particularly when   
comparing to  
results calculated using previous equations.  
The CKD-EPI equation is less accurate in patients with extremes of   
muscle mass,  
extra-renal metabolism of creatine, excessive creatine ingestion,   
or following  
therapy that affects renal tubular secretion.   
   
                                                            Performed By: #### C  
MPX ####  
[address] [address]  
[phone]  
: John Ahumada MD   
   
                                                            Performed By: #### B  
C ####  
[facility]  
[address] [place] [address]  
[phone]  
: John Ahumada MD   
   
                      Albumin [Mass/Vol] 3.2 g/dL   Low        3.5-5.2    [facility]  
   
                                        Comment on above:   Performed By: #### C  
MPX ####  
[facility]  
[address] [place] [address]  
[phone]  
: John Ahumada MD   
   
                                                            Performed By: #### B  
C ####  
[facility]  
[address] [place] [address]  
[phone]  
: John Ahumada MD   
   
                      Albumin/Glob Ratio 1.1        Normal     1.0-2.5    [facility]  
   
                                        Comment on above:   Performed By: #### C  
MPX ####  
[facility]  
[address] [place] [address]  
[phone]  
: John Ahumada MD   
   
                                                            Performed By: #### B  
C ####  
[address] [address]  
[phone]  
: John Ahumada MD   
   
                      Alkaline Phos 300 U/L    High            [facility]  
   
                                        Comment on above:   Performed By: #### C  
MPX ####  
[address] [address]  
[phone]  
: John Ahumada MD   
   
                                                            Performed By: #### B  
C ####  
[address] [address]  
[phone]  
: John Ahumada MD   
   
                                                    ALT [Catalytic   
activity/Vol]   76 U/L          High            5-41            [facility]  
   
                                        Comment on above:   Performed By: #### C  
MPX ####  
[address] [address]  
[phone]  
: John Ahumada MD   
   
                                                            Performed By: #### B  
C ####  
[address] [address]  
[phone]  
: John Ahumada MD   
   
                                                    Anion gap   
[Moles/Vol]     11 mmol/L       Normal          9-17            [facility]  
   
                                        Comment on above:   Performed By: #### C  
MPX ####  
[address] [address]  
[phone]  
: John Ahumada MD   
   
                                                            Performed By: #### B  
C ####  
[address] [address]  
[phone]  
: John Ahumada MD   
   
                                                    AST [Catalytic   
activity/Vol]   50 U/L          High            <40             [facility]  
   
                                        Comment on above:   Performed By: #### C  
MPX ####  
[address] [address]  
[phone]  
: John Ahumada MD   
   
                                                            Performed By: #### B  
C ####  
[address] [address]  
[phone]  
: John Ahumada MD   
   
                                                    Bilirubin   
[Mass/Vol]      4.2 mg/dL       High            0.3-1.2         [facility]  
   
                                        Comment on above:   Performed By: #### C  
MPX ####  
[address] [address]  
[phone]  
: John Ahumada MD   
   
                                                            Performed By: #### B  
C ####  
[address] [address]  
[phone]  
: John Ahumada MD   
   
                      Calcium [Mass/Vol] 8.5 mg/dL  Low        8.6-10.4   [facility]  
   
                                        Comment on above:   Performed By: #### C  
MPX ####  
[address] [address]  
[phone]  
: John Ahumada MD   
   
                                                            Performed By: #### B  
C ####  
[address] [address]  
[phone]  
: John Ahumada MD   
   
                                                    Chloride   
[Moles/Vol]     105 mmol/L      Normal                    [facility]  
   
                                        Comment on above:   Performed By: #### C  
MPX ####  
[address] [address]  
[phone]  
: John Ahumada MD   
   
                                                            Performed By: #### B  
C ####  
[address] [address]  
[phone]  
: John Ahumada MD   
   
                      CO2 [Moles/Vol] 22 mmol/L  Normal     20-31      [facility]  
   
                                        Comment on above:   Performed By: #### C  
MPX ####  
[address] [address]  
[phone]  
: John Ahumada MD   
   
                                                            Performed By: #### B  
C ####  
[address] [address]  
[phone]  
: John Ahumada MD   
   
                      Glucose [Mass/Vol] 82 mg/dL   Normal     70-99      [facility]  
   
                                        Comment on above:   Performed By: #### C  
MPX ####  
[address] [address]  
[phone]  
: John Ahumada MD   
   
                                                            Performed By: #### B  
C ####  
[address] [address]  
[phone]  
: John Ahumada MD   
   
                                                    Potassium   
[Moles/Vol]     4.2 mmol/L      Normal          3.7-5.3         [facility]  
   
                                        Comment on above:   Performed By: #### C  
MPX ####  
[address] [address]  
[phone]  
: John Ahumada MD   
   
                                                            Performed By: #### B  
C ####  
[address] [address]  
[phone]  
: John Ahumada MD   
   
                      Protein [Mass/Vol] 6.2 g/dL   Low        6.4-8.3    [facility]  
   
                                        Comment on above:   Performed By: #### C  
MPX ####  
[address] [address]  
[phone]  
: John Ahumada MD   
   
                                                            Performed By: #### B  
C ####  
[address] [address]  
[phone]  
: John Ahumada MD   
   
                      Sodium [Moles/Vol] 138 mmol/L Normal     135-144    [facility]  
   
                                        Comment on above:   Performed By: #### C  
MPX ####  
[address] [address]  
[phone]  
: John Ahumada MD   
   
                                                            Performed By: #### B  
C ####  
[address] [address]  
[phone]  
: John Ahumada MD   
   
                                                    Urea nitrogen   
[Mass/Vol]      11 mg/dL        Normal          8-23            [facility]  
   
                                        Comment on above:   Performed By: #### C  
MPX ####  
[address] [address]  
[phone]  
: John Ahumada MD   
   
                                                            Performed By: #### B  
C ####  
[address] [address]  
[phone]  
: John Ahumada MD   
   
                                                    Neutrophil Cytopl Abon    
   
                      MPO-ANCA   0.3 AU/mL  Normal     0.0-3.5    [facility]  
   
                                        Comment on above:   Result Comment:  
Reference Range:  
<3.5 Negative  
3.5-5.0 Equivocal  
>5.0 Positive   
   
                                                            Performed By: #### C  
MPX ####  
[address] [address]  
[phone]  
: John Ahumada MD   
   
                                                            Performed By: #### A  
ELIZABETH, AMAB ####  
[address] [address]  
[phone]  
: John Ahumada MD   
   
                      PR3-ANCA   <0.7       Normal     0.0-2.0    [facility]  
   
                                        Comment on above:   Result Comment:  
Reference Range:  
<2.0 Negative  
2.0-3.0 Equivocal  
>3.0 Positive   
   
                                                            Performed By: #### C  
MPX ####  
[address] [address]  
[phone]  
: John Ahumada MD   
   
                                                            Performed By: #### A  
ELIZABETH, AMAB ####  
[address] [address]  
[phone]  
: John Ahumada MD   
   
                                                    XR SKULL (<4 VIEWS)on 2023   
   
                                        XR SKULL (<4 VIEWS) EXAMINATION:  
3 XRAY VIEWS OF THE   
SKULL  
2023 6:20 pm  
COMPARISON:  
2023 at 11:00   
a.m.  
HISTORY:  
ORDERING SYSTEM   
PROVIDED HISTORY: f/u   
shunt setting after   
MRI, please do  
perpendicular to shunt  
TECHNOLOGIST PROVIDED   
HISTORY:  
f/u shunt setting   
after MRI, please do   
perpendicular to shunt  
Reason for Exam: f/u   
shunt setting after   
MRI. perpendicular to   
shunt.  
FINDINGS:  
There is right   
parietal   
ventriculoperitoneal   
shunt tube in position   
noted.  
The intracerebral and   
extracranial   
components of the    
shunt appear to be  
intact without kinking   
and without   
discontinuity.  
IMPRESSION:  
Intact components of   
the right   
ventriculoperitoneal   
shunt tube without any  
kinking.  
Interpreted by:  
Markel Nowak MD  
Signed by:  
Markel Nowak MD  
23  
Final result        Normal                                  [facility]  
   
                                        XR SKULL (<4 VIEWS) EXAMINATION:  
THREE XRAY VIEWS OF   
THE SKULL  
2023 5:54 pm  
COMPARISON:  
None  
HISTORY:  
ORDERING SYSTEM   
PROVIDED HISTORY:   
shunt setting check,   
please do  
perpendicular to shunt  
TECHNOLOGIST PROVIDED   
HISTORY:  
shunt setting check,   
please do   
perpendicular to shunt  
Reason for Exam: Shunt   
setting  
FINDINGS:  
There is   
redemonstration of a   
 shunt catheter. No   
evidence of catheter  
disruption or kinking.  
The patient is   
edentulous. The   
calvarium is intact.  
IMPRESSION:  
 shunt catheter   
stable in position. No   
evidence of catheter   
disruption or  
kinking.  
Interpreted by:  
Trip Arenas MD  
Signed by:  
Trip Arenas MD  
23  
Final result        Normal                                  [facility]  
   
                                                    LAXMI Screen w/reflexon 2023   
   
                      LAXMI Screen Negative   Normal     NEG        [facility]  
   
                                        Comment on above:   Performed By: #### C  
GABRIEL CARCAMO ####  
[place] [address]  
[phone]  
: John Ahumada MD   
   
                                                            Performed By: #### A  
THIAGO JOHNSON ####  
[facility]  
[address] [place] [phone] [phone]  
: John Ahumada MD   
   
                      Anti-dsDNA 2.3 IU/mL  Normal     <10.0      [facility]  
   
                                        Comment on above:   Result Comment:  
Reference Range:  
<10.0 Negative  
10.0-15.0 Equivocal  
>15.0 Positive   
   
                                                            Performed By: #### C  
GABRIEL CARCAMO ####  
[facility]  
[address] [place] [phone]  
: John Ahumada MD   
   
                                                            Performed By: #### A  
NCACP, AMAB ####  
[facility]  
[facility] [place] [address]  
[phone]  
: John Ahumada MD   
   
                      CURTIS Screen 0.2 U/mL   Normal     <0.7       [facility]  
   
                                        Comment on above:   Result Comment:  
Reference Range:  
<0.7 Negative  
0.7-1.0 Equivocal  
>1.0 Positive  
CURTIS Screen includes   
U1RNP,RNP70,Sm,Ro(SS-A),La(SS-B),CENP,Scl-70,Taryn-1   
   
                                                            Performed By: #### C  
DP, REJEC ####  
[address] [address]  
[phone]  
: John Ahumada MD   
   
                                                            Performed By: #### A  
ELIZABETH, AMAB ####  
[address] [address]  
[phone]  
: John Ahumada MD   
   
                                                    IgG Subclasseson 2023   
   
                      IgG subclass 1 757 mg/dL  Normal     240-1118   [facility]  
   
                                        Comment on above:   Result Comment: (NOT  
E)  
REFERENCE INTERVAL: Immunoglobulin G Subclass 1  
The total IgG (mg/dL) can be derived from the sum of the subclass  
IgG1, IgG2, IgG3, and IgG4 values. However, a confirmatory and  
more precise total IgG is available by the turbidimetric method of  
quantitation for total IgG. Refer to test Immunoglobulin G, Serum  
([id number]).  
Access complete set of age- and/or gender-specific reference  
intervals for this test in the [employer] [facility] Test Directory  
([email or web address].com).   
   
                                                            Performed By: #### C  
DP, REJEC ####  
[address] [address]  
[phone]  
: John Ahumada MD   
   
                                                            Performed By: #### A  
ELIZABETH, AMAB ####  
[facility]  
[facility] [place] [address]  
[phone]  
: John Ahumada MD   
   
                      IgG subclass 2 268 mg/dL  Normal     124-549    [facility]  
   
                                        Comment on above:   Result Comment: (NOT  
E)  
REFERENCE INTERVAL: Immunoglobulin G Subclass 2  
Access complete set of age- and/or gender-specific reference  
intervals for this test in the [employer] [facility] Directory  
([email or web address]).   
   
                                                            Performed By: #### C  
DP, REJEC ####  
[address] [address]  
[phone]  
: John Ahumada MD   
   
                                                            Performed By: #### A  
NCACP, AMAB ####  
[address] [address]  
[phone]  
: John Ahumada MD   
   
                      IgG subclass 3 31 mg/dL   Normal          [facility]  
   
                                        Comment on above:   Result Comment: (NOT  
E)  
REFERENCE INTERVAL: Immunoglobulin G Subclass 3  
Access complete set of age- and/or gender-specific reference  
intervals for this test in the [employer] Test Directory  
([email or web address]).   
   
                                                            Performed By: #### C  
DP, REJEC ####  
[address] [address]  
[phone]  
: John Ahumada MD   
   
                                                            Performed By: #### A  
NCACP, AMAB ####  
[address] [address]  
[phone]  
: John Ahumada MD   
   
                      IgG subclass 4 16 mg/dL   Normal     1-123      [facility]  
   
                                        Comment on above:   Result Comment: (NOT  
E)  
REFERENCE INTERVAL: Immunoglobulin G Subclass 4  
Access complete set of age- and/or gender-specific reference  
intervals for this test in the [employer] [facility] Test Directory  
([email or web address]com).  
Performed By: [facility]  
[address] [place] [id number]  
: Alejandro Hernandez MD, PhD  
CLIA Number: [id number]   
   
                                                            Performed By: #### C  
DP, REJEC ####  
[address] [address]  
[phone]  
: John Ahumada MD   
   
                                                            Performed By: #### A  
NCACP, AMAB ####  
[address] [address]  
[phone]  
: John Ahumada MD   
   
                                                    MRI ABDOMEN W WO CONTRAST MR  
CPon 2023   
   
                                                    MRI ABDOMEN W WO   
CONTRAST MRCP                           EXAMINATION:  
MRI OF THE ABDOMEN   
WITH AND WITHOUT   
CONTRAST AND MRCP   
2023 5:16 pm  
TECHNIQUE:  
Multiplanar   
multisequence MRI of   
the abdomen was   
performed without and   
with  
the administration of   
intravenous contrast.   
After initial T2 axial   
and  
coronal images, thick   
slab, thin slab and 3D   
coronal MRCP sequences   
were  
obtained without the   
administration of   
intravenous contrast.   
MIP images are  
provided for review.  
COMPARISON:  
None available.  
HISTORY:  
Cholangitis.  
FINDINGS:  
Image quality   
significantly degraded   
by motion artifact.  
No definite hepatic   
steatosis. No liver   
lesion seen. Hepatic   
vasculature  
appears to be patent.  
Gallbladder contains   
stones and sludge but   
there is no acute   
inflammation.  
Left greater than   
right central   
intrahepatic biliary   
ductal dilatation. The  
extrahepatic bile duct   
measures up to 12 mm   
and tapers distally   
with no  
definite intraductal   
filling defect seen.   
Pancreatic duct is not   
dilated and  
is no pancreas   
divisum.  
Spleen, pancreas, left   
kidney, and adrenals   
are unremarkable. At   
the upper  
pole of the right   
kidney, there is a   
complicated cystic   
mass containing  
multiple septations   
which measures 6.6 x   
6.4 x 6.0 cm without   
definite  
internal enhancement.  
No ascites or   
significant   
lymphadenopathy.   
Visualized osseous   
structures and  
gastrointestinal tract   
are unremarkable.   
Abdominal aorta is   
normal caliber.  
IMPRESSION:  
1. Cholelithiasis   
without definite acute   
cholecystitis.  
2. Extrahepatic   
biliary ductal   
dilatation measuring   
up to 12 mm without  
definite   
choledocholithiasis.  
3. At the upper pole   
of the right kidney,   
there is a 6.6 cm   
complicated  
cystic mass without   
definite enhancement   
compatible with a   
Bosniak 2F lesion.  
This can be   
re-evaluated on 6   
month follow-up.   
Suggest urology   
consultation.  
Interpreted by:  
Cecil Garcia MD  
Signed by:  
Cecil Garcia MD  
23  
Final result        Normal                                  [facility]  
   
                                                    RAD - MISCon 2023   
   
                                        RAD - MISC          [phone]  
04  
[id number]#[id number]  
TIFF                Normal                                  [facility]  
   
                                                    XR ABDOMEN (KUB) (SINGLE AP   
VIEW)on 2023   
   
                                                    XR ABDOMEN (KUB)   
(SINGLE AP VIEW)                        EXAMINATION:  
ONE SUPINE XRAY   
VIEW(S) OF THE ABDOMEN  
2023 11:12 am  
COMPARISON:  
None  
HISTORY:  
ORDERING SYSTEM   
PROVIDED HISTORY: MRI   
clearance -  shunt  
TECHNOLOGIST PROVIDED   
HISTORY:  
MRI clearance -    
shunt  
Reason for Exam: MRI   
clearance  
FINDINGS:  
 shunt catheter   
projects curving in   
the right upper   
quadrant and  
terminating in the   
left lower quadrant   
near the left pelvic   
superior brim.  
No catheter   
discontinuity seen. No   
abnormal   
calcifications seen.   
Mild  
spondylosis of the   
lower lumbar spine.   
Nonobstructive bowel   
gas pattern.  
IMPRESSION:  
 shunt catheter   
terminating in the   
left lower quadrant.  
Interpreted by:  
Alondra Agustin DO  
Signed by:  
Alondra Agustin DO  
23  
Final result        Normal                                  [facility]  
   
                                                    XR CHEST (2 VW)on 2023  
   
   
                                        XR CHEST (2 VW)     EXAMINATION:  
TWO XRAY VIEWS OF THE   
CHEST  
2023 11:12 am  
COMPARISON:  
None.  
HISTORY:  
ORDERING SYSTEM   
PROVIDED HISTORY: MRI   
clearance -  shunt  
TECHNOLOGIST PROVIDED   
HISTORY:  
MRI clearance -    
shunt  
Reason for Exam: MRI   
clearance  
FINDINGS:  
 shunt catheter   
projects over the   
anterior right medial   
chest. It projects  
into the right upper   
quadrant. No catheter   
discontinuity seen. No   
pleural  
effusion, pneumothorax   
or focal consolidation   
in the chest. The   
heart is not  
enlarged. Old   
right-sided rib   
fractures.  
IMPRESSION:  
Unremarkable  shunt   
catheter coursing   
along the anterior   
right medial chest.  
Interpreted by:  
Alondra Agustin DO  
Signed by:  
Alondra Agustin DO  
23  
Final result        Normal                                  [facility]  
   
                                                    XR SKULL (<4 VIEWS)on 2023   
   
                                        XR SKULL (<4 VIEWS) EXAMINATION:  
THREE XRAY VIEWS OF   
THE SKULL  
2023 11:12 am  
COMPARISON:  
2023  
HISTORY:  
ORDERING SYSTEM   
PROVIDED HISTORY:   
verify shunt setting,   
please do  
perpendicular to shunt   
valve  
TECHNOLOGIST PROVIDED   
HISTORY:  
verify shunt setting,   
please do   
perpendicular to shunt   
valve  
Reason for Exam:   
Perpendicular to   
shunt, verify type of   
shunt for MRI  
FINDINGS:  
Grossly stable   
position of a right   
parietal  shunt. No   
evident kinks or  
discontinuities in the   
visualized tubing. No   
acute fracture.  
IMPRESSION:  
Grossly stable   
position of a right   
parietal  shunt.  
Interpreted by:  
Inderjit Dowd MD  
Signed by:  
Inderjit Dowd MD  
23  
Final result        Normal                                  [facility]  
   
                                                    Comp Metabolic Pr/rfx MGon 1  
2023   
   
                                                    AST [Catalytic   
activity/Vol]   74 U/L          High            <40             [facility]  
   
                                        Comment on above:   Performed By: #### C  
MPX ####  
[facility]  
[address] [place] [address]  
[phone]  
: John Ahumada MD   
   
                                                    Creatinine   
[Mass/Vol]      0.6 mg/dL       Low             0.7-1.2         [facility]  
   
                                        Comment on above:   Result Comment: ICTE  
[facility]   
   
                                                            Performed By: #### C  
MPX ####  
[facility]  
[address] [place] [address]  
[phone]  
: John Ahumada MD   
   
                                                    GFR/1.73 sq   
M.predicted among   
non-blacks MDRD   
(S/P/Bld) [Vol   
rate/Area]      mL/min/{1.73_m2} Normal          >60             [facility]  
   
                                        Comment on above:   Result Comment:  
These results are not intended for use in patients <18 years of   
age.  
eGFR results are calculated without a race factor using the    
CKD-EPI  
equation.  
Careful clinical correlation is recommended, particularly when   
comparing to  
results calculated using previous equations.  
The CKD-EPI equation is less accurate in patients with extremes of   
muscle mass,  
extra-renal metabolism of creatine, excessive creatine ingestion,   
or following  
therapy that affects renal tubular secretion.   
   
                                                            Performed By: #### C  
MPX ####  
[facility]  
[address] [place] [address]  
[phone]  
: John Ahumada MD   
   
                      Albumin [Mass/Vol] 3.3 g/dL   Low        3.5-5.2    [facility]  
   
                                        Comment on above:   Performed By: #### C  
MPX ####  
[facility]  
[address] [place] [address]  
[phone]  
: John Ahumada MD   
   
                      Albumin/Glob Ratio 1.1        Normal     1.0-2.5    [facility]  
   
                                        Comment on above:   Performed By: #### C  
MPX ####  
[facility]  
[address] [place] [address]  
[phone]  
: John Ahumada MD   
   
                      Alkaline Phos 362 U/L    High            [facility]  
   
                                        Comment on above:   Performed By: #### C  
MPX ####  
[address] [address]  
[phone]  
: John Ahumada MD   
   
                                                    ALT [Catalytic   
activity/Vol]   96 U/L          High            5-41            [facility]  
   
                                        Comment on above:   Performed By: #### C  
MPX ####  
[address] [address]  
[phone]  
: John Ahumada MD   
   
                                                    Anion gap   
[Moles/Vol]     12 mmol/L       Normal          9-17            [facility]  
   
                                        Comment on above:   Performed By: #### C  
MPX ####  
[address] [address]  
[phone]  
: John Ahumada MD   
   
                                                    Bilirubin   
[Mass/Vol]      6.1 mg/dL       High            0.3-1.2         [facility]  
   
                                        Comment on above:   Performed By: #### C  
MPX ####  
[address] [address]  
[phone]  
: John Ahumada MD   
   
                      Calcium [Mass/Vol] 8.8 mg/dL  Normal     8.6-10.4   [facility]  
   
                                        Comment on above:   Performed By: #### C  
MPX ####  
[address] [address]  
[phone]  
: John Ahumada MD   
   
                                                    Chloride   
[Moles/Vol]     100 mmol/L      Normal                    [facility]  
   
                                        Comment on above:   Performed By: #### C  
MPX ####  
[address] [address]  
[phone]  
: John Ahumada MD   
   
                      CO2 [Moles/Vol] 22 mmol/L  Normal     20-31      [facility]  
   
                                        Comment on above:   Performed By: #### C  
MPX ####  
[address] [address]  
[phone]  
: John Ahumada MD   
   
                      Glucose [Mass/Vol] 83 mg/dL   Normal     70-99      [facility]  
   
                                        Comment on above:   Performed By: #### C  
MPX ####  
[address] [address]  
[phone]  
: John Ahumada MD   
   
                                                    Potassium   
[Moles/Vol]     4.0 mmol/L      Normal          3.7-5.3         [facility]  
   
                                        Comment on above:   Performed By: #### C  
MPX ####  
[address] [address]  
[phone]  
: John Ahumada MD   
   
                      Protein [Mass/Vol] 6.4 g/dL   Normal     6.4-8.3    [facility]  
   
                                        Comment on above:   Performed By: #### C  
MPX ####  
[address] [address]  
[phone]  
: John Ahumada MD   
   
                      Sodium [Moles/Vol] 134 mmol/L Low        135-144    [facility]  
   
                                        Comment on above:   Performed By: #### C  
MPX ####  
[address] [address]  
[phone]  
: John Ahumada MD   
   
                                                    Urea nitrogen   
[Mass/Vol]      9 mg/dL         Normal          8-23            [facility]  
   
                                        Comment on above:   Performed By: #### C  
MPX ####  
[address] [address]  
[phone]  
: John Ahumada MD   
   
                                                    Smooth Muscle Abon   
3   
   
                      Smooth Muscle Ab 6 Units    Normal     0-19       [facility]  
   
                                        Comment on above:   Result Comment: (NOT  
E)  
If F-Actin (Smooth Muscle) Antibody, IgG is negative, the Smooth  
Muscle Antibody titer by IFA is not performed.  
REFERENCE INTERVAL: F-Actin (Smooth Muscle) Antibody, IgG by  
ARIEL  
19 Units or less ....... Negative  
20 - 30 Units .......... Weak Positive-Suggest repeat  
testing in two to three weeks  
with fresh specimen.  
31 Units or greater..... Positive-Suggestive of  
autoimmune hepatitis type 1  
or chronic active hepatitis.  
F-actin IgG antibodies have been shown to have increased  
sensitivity for autoimmune hepatitis (AIH) but lower specificity  
than smooth muscle antibodies (SMA). F-actin IgG antibodies can  
also be seen in SMA-negative disease controls (non-AIH),  
especially in patients with primary biliary cirrhosis and chronic  
hepatitis C infections. Some patients with AIH may be SMA-positive  
but negative for F-actin IgG. Consider testing for SMA by IFA if  
suspicion for AIH is strong.  
Performed By: [facility]  
[address] [place] [id number]  
: Alejandro Hernandez MD, PhD  
CLIA Number: [id number]   
   
                                                            Performed By: #### C  
DP, REJEC ####  
[address] [id number] [phone]  
: John Ahumada MD   
   
                                                            Performed By: #### A  
NCACP, AMAB ####  
[address] [id number] [phone]  
: John Ahumada MD   
   
                                                    XR SKULL (<4 VIEWS)on 2023   
   
                                        XR SKULL (<4 VIEWS) EXAMINATION:  
THREE XRAY VIEWS OF   
THE SKULL  
2023 9:40 am  
COMPARISON:  
None.  
HISTORY:  
ORDERING SYSTEM   
PROVIDED HISTORY:   
clear  shunt for MRI  
TECHNOLOGIST PROVIDED   
HISTORY:  
clear  shunt for MRI  
FINDINGS:  
There is an intact   
shunt tube from a   
right parietal   
approach. There is no  
acute osseous   
abnormality. The   
surrounding soft   
tissues are   
unremarkable.  
IMPRESSION:  
Shunt tube from a   
right parietal   
approach that is   
intact. Correlate with  
expected documented   
information in regards   
to make/model of shunt   
tube to  
ensure compatibility.  
Interpreted by:  
Jarred Henson MD  
Signed by:  
Jarred Henson MD  
23  
Final result        Normal                                  [facility]  
   
                                                    CBC with Diffon 2023   
   
                      Abs. Basophil 0.07 k/uL  Normal     0.00-0.20  [facility]  
   
                                        Comment on above:   Performed By: #### C  
MPX ####  
[address] [address]  
[phone]  
: John Ahumada MD   
   
                      Abs.Imm.Granulocyte 0.08 k/uL  Normal     0.00-0.30  [facility]  
   
                                        Comment on above:   Performed By: #### C  
MPX ####  
[address] [address]  
[phone]  
: John Ahumada MD   
   
                                                    Abs.Neutrophil   
(Seg)           4.34 k/uL       Normal          1.50-8.10       [facility]  
   
                                        Comment on above:   Performed By: #### C  
MPX ####  
[address] [address]  
[phone]  
: John Ahumada MD   
   
                                                    Basophils/100 WBC   
(Bld)           1 %             Normal          0-2             [facility]  
   
                                        Comment on above:   Performed By: #### C  
MPX ####  
[address] [address]  
[phone]  
: John Ahumada MD   
   
                                                    Eosinophils (Bld)   
[#/Vol]         0.17 10*3/uL    Normal          0.00-0.44       [facility]  
   
                                        Comment on above:   Performed By: #### C  
MPX ####  
[address] [address]  
[phone]  
: John Ahumada MD   
   
                                                    Eosinophils/100 WBC   
(Bld)           3 %             Normal          1-4             [facility]  
   
                                        Comment on above:   Performed By: #### C  
MPX ####  
[address] [address]  
[phone]  
: John Ahumada MD   
   
                                                    Erythrocyte   
distribution width   
(RBC) [Ratio]   16.2 %          High            11.8-14.4       [facility]  
   
                                        Comment on above:   Performed By: #### C  
MPX ####  
[address] [address]  
[phone]  
: John Ahumada MD   
   
                                                    Hematocrit (Bld)   
[Volume fraction] 43.7 %          Normal          40.7-50.3       [facility]  
   
                                        Comment on above:   Performed By: #### C  
MPX ####  
[address] [address]  
[phone]  
: John Ahumada MD   
   
                                                    Hemoglobin (Bld)   
[Mass/Vol]      13.9 g/dL       Normal          13.0-17.0       [facility]  
   
                                        Comment on above:   Performed By: #### C  
MPX ####  
[address] [address]  
[phone]  
: John Ahumada MD   
   
                                                    Immature   
granulocytes/100   
WBC (Bld)       1 %             High            0               [facility]  
   
                                        Comment on above:   Performed By: #### C  
MPX ####  
[address] [address]  
[phone]  
: John Ahumada MD   
   
                                                    Lymphocytes (Bld)   
[#/Vol]         1.47 10*3/uL    Normal          1.10-3.70       [facility]  
   
                                        Comment on above:   Performed By: #### C  
MPX ####  
[address] [address]  
[phone]  
: John Ahumada MD   
   
                                                    Lymphocytes/100 WBC   
(Bld)           22 %            Low             24-43           [facility]  
   
                                        Comment on above:   Performed By: #### C  
MPX ####  
[address] [address]  
[phone]  
: John Ahumada MD   
   
                                                    MCH (RBC) [Entitic   
mass]           29.8 pg         Normal          25.2-33.5       [facility]  
   
                                        Comment on above:   Performed By: #### C  
MPX ####  
[address] [address]  
[phone]  
: John Ahumada MD   
   
                                                    MCHC (RBC)   
[Mass/Vol]      31.8 g/dL       Normal          28.4-34.8       [facility]  
   
                                        Comment on above:   Performed By: #### C  
MPX ####  
[address] [address]  
[phone]  
: John Ahumada MD   
   
                                                    MCV (RBC) [Entitic   
vol]            93.6 fL         Normal          82.6-102.9      [facility]  
   
                                        Comment on above:   Performed By: #### C  
MPX ####  
[address] [address]  
[phone]  
: John Ahumada MD   
   
                                                    Monocytes (Bld)   
[#/Vol]         0.59 10*3/uL    Normal          0.10-1.20       [facility]  
   
                                        Comment on above:   Performed By: #### C  
MPX ####  
[address] [address]  
[phone]  
: John Ahumada MD   
   
                                                    Monocytes/100 WBC   
(Bld)           9 %             Normal          3-12            [facility]  
   
                                        Comment on above:   Performed By: #### C  
MPX ####  
[address] [address]  
[phone]  
: John Ahumada MD   
   
                      Neutrophil (Seg) 64 %       Normal     36-65      [facility]  
   
                                        Comment on above:   Performed By: #### C  
MPX ####  
[address] [address]  
[phone]  
: John Ahumada MD   
   
                      NRBC Automated 0.0 per 100 WBC Normal     0.0        [facility]  
   
                                        Comment on above:   Performed By: #### C  
MPX ####  
[address] [address]  
[phone]  
: John Ahumada MD   
   
                                                    Platelet mean   
volume (Bld)   
[Entitic vol]   10.8 fL         Normal          8.1-13.5        [facility]  
   
                                        Comment on above:   Performed By: #### C  
MPX ####  
[address] [address]  
[phone]  
: John Ahumada MD   
   
                                                    Platelets (Bld)   
[#/Vol]         251 10*3/uL     Normal          138-453         [facility]  
   
                                        Comment on above:   Performed By: #### C  
MPX ####  
[address] [place] [address]  
[phone]  
: John Ahumada MD   
   
                      RBC (Bld) [#/Vol] 4.67 10*6/uL Normal     4.21-5.77  [facility]  
   
                                        Comment on above:   Performed By: #### C  
MPX ####  
[address] [address]  
[phone]  
: John Ahumada MD   
   
                                                    RBC morphology   
finding Nom (Bld) ANISOCYTOSIS PRESENT Normal                          [facility]  
   
                                        Comment on above:   Performed By: #### C  
MPX ####  
[address] [address]  
[phone]  
: John Ahumada MD   
   
                      WBC (Bld) [#/Vol] 6.7 10*3/uL Normal     3.5-11.3   [facility]  
   
                                        Comment on above:   Performed By: #### C  
MPX ####  
[address] [address]  
[phone]  
: John Ahumada MD   
   
                                                    Comp Metabolic Pr/rfx MGon 1  
2023   
   
                                                    Creatinine   
[Mass/Vol]      0.5 mg/dL       Low             0.7-1.2         [facility]  
   
                                        Comment on above:   Result Comment: ICTE  
[facility]   
   
                                                            Performed By: #### C  
MPX ####  
[address] [address]  
[phone]  
: John Ahumada MD   
   
                                                    GFR/1.73 sq   
M.predicted among   
non-blacks MDRD   
(S/P/Bld) [Vol   
rate/Area]      mL/min/{1.73_m2} Normal          >60             [facility]  
   
                                        Comment on above:   Result Comment:  
These results are not intended for use in patients <18 years of   
age.  
eGFR results are calculated without a race factor using the    
CKD-EPI  
equation.  
Careful clinical correlation is recommended, particularly when   
comparing to  
results calculated using previous equations.  
The CKD-EPI equation is less accurate in patients with extremes of   
muscle mass,  
extra-renal metabolism of creatine, excessive creatine ingestion,   
or following  
therapy that affects renal tubular secretion.   
   
                                                            Performed By: #### C  
MPX ####  
[address] [address]  
[phone]  
: John Ahumada MD   
   
                      Albumin [Mass/Vol] 3.1 g/dL   Low        3.5-5.2    [facility]  
   
                                        Comment on above:   Performed By: #### C  
MPX ####  
[address] [address]  
[phone]  
: John Ahumada MD   
   
                      Albumin/Glob Ratio 1.1        Normal     1.0-2.5    [facility]  
   
                                        Comment on above:   Performed By: #### C  
MPX ####  
[address] [address]  
[phone]  
: John Ahumada MD   
   
                      Alkaline Phos 362 U/L    High            [facility]  
   
                                        Comment on above:   Performed By: #### C  
MPX ####  
[address] [address]  
[phone]  
: John Ahumada MD   
   
                                                    ALT [Catalytic   
activity/Vol]   98 U/L          High            5-41            [facility]  
   
                                        Comment on above:   Performed By: #### C  
MPX ####  
[address] [address]  
[phone]  
: John Ahumada MD   
   
                                                    Anion gap   
[Moles/Vol]     12 mmol/L       Normal          9-17            [facility]  
   
                                        Comment on above:   Performed By: #### C  
MPX ####  
[address] [address]  
[phone]  
: John Ahumada MD   
   
                                                    AST [Catalytic   
activity/Vol]   80 U/L          High            <40             [facility]  
   
                                        Comment on above:   Performed By: #### C  
MPX ####  
[address] [place]  
[place] [address]  
[phone]  
: John Ahumada MD   
   
                                                    Bilirubin   
[Mass/Vol]      6.8 mg/dL       High            0.3-1.2         [facility]  
   
                                        Comment on above:   Performed By: #### C  
MPX ####  
[address] [address]  
[phone]  
: John Ahumada MD   
   
                      Calcium [Mass/Vol] 8.9 mg/dL  Normal     8.6-10.4   [facility]  
   
                                        Comment on above:   Performed By: #### C  
MPX ####  
[address] [address]  
[phone]  
: John Ahumada MD   
   
                                                    Chloride   
[Moles/Vol]     102 mmol/L      Normal                    [facility]  
   
                                        Comment on above:   Performed By: #### C  
MPX ####  
[address] [address]  
[phone]  
: John Ahumada MD   
   
                      CO2 [Moles/Vol] 20 mmol/L  Normal     20-31      [facility]  
   
                                        Comment on above:   Performed By: #### C  
MPX ####  
[address] [address]  
[phone]  
: John Ahumada MD   
   
                      Glucose [Mass/Vol] 85 mg/dL   Normal     70-99      [facility]  
   
                                        Comment on above:   Performed By: #### C  
MPX ####  
[address] [address]  
[phone]  
: John Ahumada MD   
   
                                                    Potassium   
[Moles/Vol]     3.6 mmol/L      Low             3.7-5.3         [facility]  
   
                                        Comment on above:   Performed By: #### C  
MPX ####  
[address] [address]  
[phone]  
: John Ahumada MD   
   
                      Protein [Mass/Vol] 6.0 g/dL   Low        6.4-8.3    [facility]  
   
                                        Comment on above:   Performed By: #### C  
MPX ####  
[address] [address]  
[phone]  
: John Ahumada MD   
   
                      Sodium [Moles/Vol] 134 mmol/L Low        135-144    [facility]  
   
                                        Comment on above:   Performed By: #### C  
MPX ####  
[address] [address]  
[phone]  
: John Ahumada MD   
   
                                                    Urea nitrogen   
[Mass/Vol]      9 mg/dL         Normal          8-23            [facility]  
   
                                        Comment on above:   Performed By: #### C  
MPX ####  
[address] [address]  
[phone]  
: John Ahumada MD   
   
                                                    Ammoniaon 2023   
   
                                                    Ammonia (P)   
[Moles/Vol]     27 umol/L       Normal          16-60           [facility]  
   
                                        Comment on above:   Performed By: #### B  
C ####  
[address] [address]  
[phone]  
: John Ahumada MD   
   
                                                            Performed By: #### C  
DP, REJEC ####  
[address] [address]  
[phone]  
: John Ahumada MD   
   
                                                    Basic Metabolic Profon    
   
                                                    Creatinine   
[Mass/Vol]      0.6 mg/dL       Low             0.7-1.2         [facility]  
   
                                        Comment on above:   Result Comment: ICTE  
[place] SPECIMEN   
   
                                                            Performed By: #### B  
C ####  
[address] [address]  
[phone]  
: John Ahumada MD   
   
                                                    GFR/1.73 sq   
M.predicted among   
non-blacks MDRD   
(S/P/Bld) [Vol   
rate/Area]      mL/min/{1.73_m2} Normal          >60             [facility]  
   
                                        Comment on above:   Result Comment:  
These results are not intended for use in patients <18 years of   
age.  
eGFR results are calculated without a race factor using the    
CKD-EPI  
equation.  
Careful clinical correlation is recommended, particularly when   
comparing to  
results calculated using previous equations.  
The CKD-EPI equation is less accurate in patients with extremes of   
muscle mass,  
extra-renal metabolism of creatine, excessive creatine ingestion,   
or following  
therapy that affects renal tubular secretion.   
   
                                                            Performed By: #### B  
C ####  
[address] [address]  
[phone]  
: John Ahumada MD   
   
                                                    Anion gap   
[Moles/Vol]     10 mmol/L       Normal          9-17            [facility]  
   
                                        Comment on above:   Performed By: #### B  
C ####  
[address] [address]  
[phone]  
: John Ahumada MD   
   
                      Calcium [Mass/Vol] 8.7 mg/dL  Normal     8.6-10.4   [facility]  
   
                                        Comment on above:   Performed By: #### B  
C ####  
[address] [address]  
[phone]  
: John Ahumada MD   
   
                                                    Chloride   
[Moles/Vol]     102 mmol/L      Normal                    [facility]  
   
                                        Comment on above:   Performed By: #### B  
C ####  
[address] [address]  
[phone]  
: John Ahumada MD   
   
                      CO2 [Moles/Vol] 24 mmol/L  Normal     20-31      [facility]  
   
                                        Comment on above:   Performed By: #### B  
C ####  
[facility]  
[address] [place] [address]  
[phone]  
: John Ahumada MD   
   
                      Glucose [Mass/Vol] 82 mg/dL   Normal     70-99      [facility]  
   
                                        Comment on above:   Performed By: #### B  
C ####  
[address] [address]  
[phone]  
: John Ahumada MD   
   
                                                    Potassium   
[Moles/Vol]     3.7 mmol/L      Normal          3.7-5.3         [facility]  
   
                                        Comment on above:   Performed By: #### B  
C ####  
[address] [address]  
[phone]  
: John Ahumada MD   
   
                      Sodium [Moles/Vol] 136 mmol/L Normal     135-144    [facility]  
   
                                        Comment on above:   Performed By: #### B  
C ####  
[address] [address]  
[phone]  
: John Ahumada MD   
   
                                                    Urea nitrogen   
[Mass/Vol]      9 mg/dL         Normal          8-23            [facility]  
   
                                        Comment on above:   Performed By: #### B  
C ####  
[address] [address]  
[phone]  
: John Ahumada MD   
   
                                                    CA 19-9on 2023   
   
                      CA 19-9    650 U/mL   High       0-35       [facility]  
   
                                        Comment on above:   Result Comment: The   
Roche  ECLIA  assay is used. Results   
obtained   
with different assay methods  
cannot be used interchangeably.   
   
                                                            Performed By: #### C  
DP, REJEC ####  
[address] [address]  
[phone]  
: John Ahumada MD   
   
                                                            Performed By: #### A  
NCACP, AMAB ####  
[address] [address]  
[phone]  
: John Ahumada MD   
   
                                                    CBC with Diffon 2023   
   
                      Abs. Basophil 0.06 k/uL  Normal     0.00-0.20  [facility]  
   
                                        Comment on above:   Performed By: #### B  
C ####  
[address] [address]  
[phone]  
: John Ahumada MD   
   
                      Abs.Imm.Granulocyte 0.07 k/uL  Normal     0.00-0.30  [facility]  
   
                                        Comment on above:   Performed By: #### B  
C ####  
[address] [address]  
[phone]  
: John Ahumada MD   
   
                                                    Abs.Neutrophil   
(Seg)           3.78 k/uL       Normal          1.50-8.10       [facility]  
   
                                        Comment on above:   Performed By: #### B  
C ####  
[address] [address]  
[phone]  
: John Ahumada MD   
   
                                                    Basophils/100 WBC   
(Bld)           1 %             Normal          0-2             [facility]  
   
                                        Comment on above:   Performed By: #### B  
C ####  
[address] [address]  
[phone]  
: John Ahumada MD   
   
                                                    Eosinophils (Bld)   
[#/Vol]         0.21 10*3/uL    Normal          0.00-0.44       [facility]  
   
                                        Comment on above:   Performed By: #### B  
C ####  
[place] [address]  
[phone]  
: John Ahumada MD   
   
                                                    Eosinophils/100 WBC   
(Bld)           3 %             Normal          1-4             [facility]  
   
                                        Comment on above:   Performed By: #### B  
C ####  
[address] [address]  
[phone]  
: John Ahumada MD   
   
                                                    Erythrocyte   
distribution width   
(RBC) [Ratio]   17.1 %          High            11.8-14.4       [facility]  
   
                                        Comment on above:   Performed By: #### B  
C ####  
[address] [address]  
[phone]  
: John Ahumada MD   
   
                                                    Hematocrit (Bld)   
[Volume fraction] 42.7 %          Normal          40.7-50.3       [facility]  
   
                                        Comment on above:   Performed By: #### B  
C ####  
[address] [address]  
[phone]  
: John Ahumada MD   
   
                                                    Hemoglobin (Bld)   
[Mass/Vol]      14.2 g/dL       Normal          13.0-17.0       [facility]  
   
                                        Comment on above:   Performed By: #### B  
C ####  
[address] [address]  
[phone]  
: John Ahumada MD   
   
                                                    Immature   
granulocytes/100   
WBC (Bld)       1 %             High            0               [facility]  
   
                                        Comment on above:   Performed By: #### B  
C ####  
[address] [address]  
[phone]  
: John Ahumada MD   
   
                                                    Lymphocytes (Bld)   
[#/Vol]         1.58 10*3/uL    Normal          1.10-3.70       [facility]  
   
                                        Comment on above:   Performed By: #### B  
C ####  
[address] [address]  
[phone]  
: John Ahumada MD   
   
                                                    Lymphocytes/100 WBC   
(Bld)           25 %            Normal          24-43           [facility]  
   
                                        Comment on above:   Performed By: #### B  
C ####  
[address] [address]  
[phone]  
: John Ahumada MD   
   
                                                    MCH (RBC) [Entitic   
mass]           30.0 pg         Normal          25.2-33.5       [facility]  
   
                                        Comment on above:   Performed By: #### B  
C ####  
[address] [address]  
[phone]  
: John Ahumada MD   
   
                                                    MCHC (RBC)   
[Mass/Vol]      33.3 g/dL       Normal          28.4-34.8       [facility]  
   
                                        Comment on above:   Performed By: #### B  
C ####  
[address] [address]  
[phone]  
: John Ahumada MD   
   
                                                    MCV (RBC) [Entitic   
vol]            90.1 fL         Normal          82.6-102.9      [facility]  
   
                                        Comment on above:   Performed By: #### B  
C ####  
[place] [id number]  
[phone]  
: John Ahumada MD   
   
                                                    Monocytes (Bld)   
[#/Vol]         0.54 10*3/uL    Normal          0.10-1.20       [facility]  
   
                                        Comment on above:   Performed By: #### B  
C ####  
[address] [address]  
[phone]  
: John Ahumada MD   
   
                                                    Monocytes/100 WBC   
(Bld)           9 %             Normal          3-12            [facility]  
   
                                        Comment on above:   Performed By: #### B  
C ####  
[address] [address]  
[phone]  
: John Ahumada MD   
   
                      Neutrophil (Seg) 61 %       Normal     36-65      [facility]  
   
                                        Comment on above:   Performed By: #### B  
C ####  
[address] [address]  
[phone]  
: John Ahumada MD   
   
                      NRBC Automated 0.0 per 100 WBC Normal     0.0        [facility]  
   
                                        Comment on above:   Performed By: #### B  
C ####  
[address] [address]  
[phone]  
: John Ahumada MD   
   
                                                    Platelet mean   
volume (Bld)   
[Entitic vol]   11.5 fL         Normal          8.1-13.5        [facility]  
   
                                        Comment on above:   Performed By: #### B  
C ####  
[address] [address]  
[phone]  
: John Ahumada MD   
   
                                                    Platelets (Bld)   
[#/Vol]         254 10*3/uL     Normal          138-453         [facility]  
   
                                        Comment on above:   Performed By: #### B  
C ####  
[address] [address]  
[phone]  
: John Ahumada MD   
   
                      RBC (Bld) [#/Vol] 4.74 10*6/uL Normal     4.21-5.77  [facility]  
   
                                        Comment on above:   Performed By: #### B  
C ####  
[address] [address]  
[phone]  
: John Ahumada MD   
   
                                                    RBC morphology   
finding Nom (Bld) ANISOCYTOSIS PRESENT Normal                          [facility]  
   
                                        Comment on above:   Performed By: #### B  
C ####  
[address] [address]  
[phone]  
: John Ahumada MD   
   
                      WBC (Bld) [#/Vol] 6.2 10*3/uL Normal     3.5-11.3   [facility]  
   
                                        Comment on above:   Performed By: #### B  
C ####  
[address] [address]  
[phone]  
: John Ahumada MD   
   
                                                    Calcium, Ionicon 2023   
   
                      Calcium [Moles/Vol] 1.18 mmol/L Normal     1.13-1.33  [facility]  
   
                                        Comment on above:   Performed By: #### B  
C ####  
[address] [address]  
[phone]  
: John Ahumada MD   
   
                                                            Performed By: #### C  
DP, REJEC ####  
[address] [address]  
[phone]  
: John Ahumada MD   
   
                                                    Carcinoembry. Antig.on    
   
                                                    Carcinoembry.   
Antig.          1.6 ng/mL       Normal          <3.9            [facility]  
   
                                        Comment on above:   Result Comment: The   
Roche  ECLIA  assay is used. Results   
obtained   
with different assay methods  
cannot be used interchangeably.   
   
                                                            Performed By: #### C  
DP, REJEC ####  
[address] [address]  
[phone]  
: John Ahumada MD   
   
                                                            Performed By: #### A  
NCACP, AMAB ####  
[address] [address]  
[phone]  
: John Ahumada MD   
   
                                                    Extra Yellow Tubeon 20  
23   
   
                      Extra Yellow Tube            Normal                [facility]  
   
                                        Comment on above:   Performed By: #### B  
C ####  
[address] [address]  
[phone]  
: John Ahumada MD   
   
                                                            Performed By: #### C  
DP, REJEC ####  
[address] [address]  
[phone]  
: John Ahumada MD   
   
                                                    IgMon 2023   
   
                      IgM [Mass/Vol] 82 mg/dL   Normal          [facility]  
   
                                        Comment on above:   Performed By: #### C  
DP, REJEC ####  
[address] [address]  
[phone]  
: John Ahumada MD   
   
                                                            Performed By: #### A  
NCACP, AMAB ####  
[address] [address]  
[phone]  
: John Ahumada MD   
   
                                                    Liver Profileon 2023   
   
                      Albumin [Mass/Vol] 3.3 g/dL   Low        3.5-5.2    [facility]  
   
                                        Comment on above:   Performed By: #### B  
C ####  
[address] [address]  
[phone]  
: John Ahumada MD   
   
                      Albumin/Glob Ratio 1.2        Normal     1.0-2.5    [facility]  
   
                                        Comment on above:   Performed By: #### B  
C ####  
[address] [address]  
[phone]  
: John Ahumada MD   
   
                      Alkaline Phos 363 U/L    High            [facility]  
   
                                        Comment on above:   Performed By: #### B  
C ####  
[address] [address]  
[phone]  
: John Ahumada MD   
   
                                                    ALT [Catalytic   
activity/Vol]   100 U/L         High            5-41            [facility]  
   
                                        Comment on above:   Performed By: #### B  
C ####  
[address] [address]  
[phone]  
: John Ahumada MD   
   
                                                    AST [Catalytic   
activity/Vol]   70 U/L          High            <40             [facility]  
   
                                        Comment on above:   Performed By: #### B  
C ####  
[address] [address]  
[phone]  
: John Ahumada MD   
   
                                                    Bilirubin   
[Mass/Vol]      7.5 mg/dL       High            0.3-1.2         [facility]  
   
                                        Comment on above:   Performed By: #### B  
C ####  
[address] [address]  
[phone]  
: John Ahumada MD   
   
                      Bilirubin, Indirect 2.6 mg/dL  High       0.0-1.0    [facility]  
   
                                        Comment on above:   Performed By: #### B  
C ####  
[address] [address]  
[phone]  
: John Ahumada MD   
   
                                                    Bilirubin.indirect   
[Mass/Vol]      4.9 mg/dL       High            <0.3            [facility]  
   
                                        Comment on above:   Performed By: #### B  
C ####  
[address] [address]  
[phone]  
: John Ahumada MD   
   
                      Protein [Mass/Vol] 6.0 g/dL   Low        6.4-8.3    [facility]  
   
                                        Comment on above:   Performed By: #### B  
C ####  
[address] [address]  
[phone]  
: John Ahumada MD   
   
                                                    RA Screenon 2023   
   
                      RA Screen  <10        Normal     <14        [facility]  
   
                                        Comment on above:   Performed By: #### C  
DP, REJEC ####  
[address] [address]  
[phone]  
: John Ahumada MD   
   
                                                            Performed By: #### A  
NCACP, AMAB ####  
[facility]  
[address] [place] [id number] [phone]  
: John Ahumada MD   
   
                                                    Calcium, Ionicon 2023   
   
                      Calcium [Moles/Vol] 1.12 mmol/L Low        1.13-1.33  [facility]  
   
                                        Comment on above:   Performed By: #### I  
OCAL, LIP, MG, CHARANJIT, TRIG ####  
[facility]  
[address] [place] [address]  
[phone]  
: John Ahumada MD   
   
                                                            Performed By: #### P  
HO, LIP, IOCAL, MG, TRIG ####  
[facility]  
[address] [place] [address]  
[phone]  
: John Ahumada MD   
   
                                                    Comp Metab w/Bili Pron 7   
   
                                                    Creatinine   
[Mass/Vol]      0.6 mg/dL       Low             0.7-1.2         [facility]  
   
                                        Comment on above:   Result Comment: ICTE  
[place] SPECIMEN   
   
                                                            Performed By: #### C  
MPX ####  
[facility]  
[address] [place] [address]  
[phone]  
: John Ahumada MD   
   
                                                            Performed By: #### B  
C ####  
[facility]  
[address] [place] [address]  
[phone]  
: John Ahumada MD   
   
                                                    GFR/1.73 sq   
M.predicted among   
non-blacks MDRD   
(S/P/Bld) [Vol   
rate/Area]      mL/min/{1.73_m2} Normal          >60             [facility]  
   
                                        Comment on above:   Result Comment:  
These results are not intended for use in patients <18 years of   
age.  
eGFR results are calculated without a race factor using the    
CKD-EPI  
equation.  
Careful clinical correlation is recommended, particularly when   
comparing to  
results calculated using previous equations.  
The CKD-EPI equation is less accurate in patients with extremes of   
muscle mass,  
extra-renal metabolism of creatine, excessive creatine ingestion,   
or following  
therapy that affects renal tubular secretion.   
   
                                                            Performed By: #### C  
MPX ####  
[address] [address]  
[phone]  
: John Ahumada MD   
   
                                                            Performed By: #### B  
C ####  
[address] [address]  
[phone]  
: John Ahumada MD   
   
                      Albumin [Mass/Vol] 3.0 g/dL   Low        3.5-5.2    [facility]  
   
                                        Comment on above:   Performed By: #### C  
MPX ####  
[address] [address]  
[phone]  
: John Ahumada MD   
   
                                                            Performed By: #### B  
C ####  
[address] [address]  
[phone]  
: John Ahumada MD   
   
                      Albumin/Glob Ratio 1.0        Normal     1.0-2.5    [facility]  
   
                                        Comment on above:   Performed By: #### C  
MPX ####  
[address] [address]  
[phone]  
: John Ahumada MD   
   
                                                            Performed By: #### B  
C ####  
[address] [address]  
[phone]  
: John Ahumada MD   
   
                      Alkaline Phos 358 U/L    High            [facility]  
   
                                        Comment on above:   Performed By: #### C  
MPX ####  
[address] [address]  
[phone]  
: John Ahumada MD   
   
                                                            Performed By: #### B  
C ####  
[address] [address]  
[phone]  
: John Ahumada MD   
   
                                                    ALT [Catalytic   
activity/Vol]   107 U/L         High            5-41            [facility]  
   
                                        Comment on above:   Performed By: #### C  
MPX ####  
[address] [address]  
[phone]  
: John Ahumada MD   
   
                                                            Performed By: #### B  
C ####  
[address] [address]  
[phone]  
: John Ahumada MD   
   
                                                    Anion gap   
[Moles/Vol]     10 mmol/L       Normal          9-17            [facility]  
   
                                        Comment on above:   Performed By: #### C  
MPX ####  
[address] [address]  
[phone]  
: John Ahumada MD   
   
                                                            Performed By: #### B  
C ####  
[address] [address]  
[phone]  
: John Ahumada MD   
   
                                                    AST [Catalytic   
activity/Vol]   76 U/L          High            <40             [facility]  
   
                                        Comment on above:   Performed By: #### C  
MPX ####  
[address] [address]  
[phone]  
: John Ahumada MD   
   
                                                            Performed By: #### B  
C ####  
[address] [address]  
[phone]  
: John Ahumada MD   
   
                                                    Bilirubin   
[Mass/Vol]      8.5 mg/dL       High            0.3-1.2         [facility]  
   
                                        Comment on above:   Performed By: #### C  
MPX ####  
[address] [address]  
[phone]  
: John Ahumada MD   
   
                                                            Performed By: #### B  
C ####  
[address] [address]  
[phone]  
: John Ahumada MD   
   
                      Bilirubin, Indirect 2.9 mg/dL  High       0.0-1.0    [facility]  
   
                                        Comment on above:   Performed By: #### C  
MPX ####  
[address] [address]  
[phone]  
: John Ahumada MD   
   
                                                            Performed By: #### B  
C ####  
[facility]  
[address] [place] [address]  
[phone]  
: John Ahumada MD   
   
                                                    Bilirubin.indirect   
[Mass/Vol]      5.6 mg/dL       High            <0.3            [facility]  
   
                                        Comment on above:   Performed By: #### C  
MPX ####  
[address] [address]  
[phone]  
: John Ahumada MD   
   
                                                            Performed By: #### B  
C ####  
[address] [address]  
[phone]  
: John Ahumada MD   
   
                      Calcium [Mass/Vol] 8.4 mg/dL  Low        8.6-10.4   [facility]  
   
                                        Comment on above:   Performed By: #### C  
MPX ####  
[address] [address]  
[phone]  
: John Ahumada MD   
   
                                                            Performed By: #### B  
C ####  
[address] [address]  
[phone]  
: John Ahumada MD   
   
                                                    Chloride   
[Moles/Vol]     102 mmol/L      Normal                    [facility]  
   
                                        Comment on above:   Performed By: #### C  
MPX ####  
[address] [address]  
[phone]  
: John Ahumada MD   
   
                                                            Performed By: #### B  
C ####  
[facility]  
[address] [place] [address]  
[phone]  
: John Ahumada MD   
   
                      CO2 [Moles/Vol] 22 mmol/L  Normal     20-31      [facility]  
   
                                        Comment on above:   Performed By: #### C  
MPX ####  
[address] [address]  
[phone]  
: John Ahumada MD   
   
                                                            Performed By: #### B  
C ####  
[facility]  
[address] [place] [address]  
[phone]  
: John Ahumada MD   
   
                      Glucose [Mass/Vol] 95 mg/dL   Normal     70-99      [facility]  
   
                                        Comment on above:   Performed By: #### C  
MPX ####  
[address] [address]  
[phone]  
: John Ahumada MD   
   
                                                            Performed By: #### B  
C ####  
[address] [address]  
[phone]  
: John Ahumada MD   
   
                                                    Potassium   
[Moles/Vol]     3.3 mmol/L      Low             3.7-5.3         [facility]  
   
                                        Comment on above:   Performed By: #### C  
MPX ####  
[address] [address]  
[phone]  
: John Ahumada MD   
   
                                                            Performed By: #### B  
C ####  
[address] [address]  
[phone]  
: John Ahumada MD   
   
                      Protein [Mass/Vol] 5.9 g/dL   Low        6.4-8.3    [facility]  
   
                                        Comment on above:   Performed By: #### C  
MPX ####  
[address] [address]  
[phone]  
: John Ahumada MD   
   
                                                            Performed By: #### B  
C ####  
[address] [address]  
[phone]  
: John Ahumada MD   
   
                      Sodium [Moles/Vol] 134 mmol/L Low        135-144    [facility]  
   
                                        Comment on above:   Performed By: #### C  
MPX ####  
[address] [address]  
[phone]  
: John Ahumada MD   
   
                                                            Performed By: #### B  
C ####  
[address] [address]  
[phone]  
: John Ahumada MD   
   
                                                    Urea nitrogen   
[Mass/Vol]      8 mg/dL         Normal          8-23            [facility]  
   
                                        Comment on above:   Performed By: #### C  
MPX ####  
[address] [address]  
[phone]  
: John Ahumada MD   
   
                                                            Performed By: #### B  
C ####  
[address] [address]  
[phone]  
: John Ahumada MD   
   
                                                    Lipaseon 2023   
   
                                                    Lipase [Catalytic   
activity/Vol]   48 U/L          Normal          13-60           [facility]  
   
                                        Comment on above:   Performed By: #### I  
OCAL, LIP, MG, CHARANJIT, TRIG ####  
[address] [address]  
[phone]  
: John Ahumada MD   
   
                                                            Performed By: #### P  
HO, LIP, IOCAL, MG, TRIG ####  
[address] [address]  
[phone]  
: John Ahumada MD   
   
                                                    Magnesiumon 2023   
   
                                                    Magnesium   
[Mass/Vol]      2.3 mg/dL       Normal          1.6-2.6         [facility]  
   
                                        Comment on above:   Performed By: #### I  
OCAL, LIP, MG, CHARANJIT, TRIG ####  
[address] [address]  
[phone]  
: John Ahumada MD   
   
                                                            Performed By: #### P  
HO, LIP, IOCAL, MG, TRIG ####  
[facility]  
[address] [place] [address]  
[phone]  
: John Ahumada MD   
   
                                                    PTon 2023   
   
                                                    INR Coag (PPP)   
[Relative time] 0.9 {INR}       Normal                          [facility]  
   
                                        Comment on above:   Result Comment:  
Therapeutic Range:  
Moderate Anticoagulant Intensity: INR = 2.0-3.0  
High Anticoagulant Intensity: INR = 2.5-3.5   
   
                                                            Performed By: #### C  
DP, REJEC ####  
[address]  
[place] [address]  
[phone]  
: John Ahumada MD   
   
                                                    PT Coag (PPP)   
[Time]          11.8 s          Normal          11.7-14.9       [facility]  
   
                                        Comment on above:   Performed By: #### C  
DP, REJEC ####  
[address] [address]  
[phone]  
: John Ahumada MD   
   
                                                    Phosphorus, Inorg.on   
023   
   
                      Phosphorus, Inorg. 2.6 mg/dL  Normal     2.5-4.5    [facility]  
   
                                        Comment on above:   Performed By: #### I  
OCAL, LIP, MG, CHARANJIT, TRIG ####  
[facility]  
[address] [place] [address]  
[phone]  
: John Ahumada MD   
   
                                                            Performed By: #### P  
HO, LIP, IOCAL, MG, TRIG ####  
[facility]  
[address] [place] [address]  
[phone]  
: John Ahumada MD   
   
                                                    Triglycerideson 2023   
   
                                                    Triglyceride   
[Mass/Vol]      138 mg/dL       Normal          <150            [facility]  
   
                                        Comment on above:   Result Comment:  
Triglyceride Guidelines:  
<150 Desirable  
150-199 Borderline  
200-499 High  
>499 Very high  
Based on AHA Guidelines for fasting triglyceride, 2012.   
   
                                                            Performed By: #### I  
OCAL, LIP, MG, CHARANJIT, TRIG ####  
[facility]  
[address] [place] [address]  
[phone]  
: John Ahumada MD   
   
                                                            Performed By: #### A  
NCACP, AMAB ####  
[facility]  
[address] [place] [address]  
[phone]  
: John Ahumada MD   
   
                                                    Ambulatory Visit Summaryon 1  
2023   
   
                                                    Ambulatory Visit   
Summary                                 [Image Removed]  
TAWANDA VILLARREAL  
:1955  
MRN:[id number]  
Visit Date:2023  
Ambulatory Visit   
Instructions  
Your Diagnosis  
Jaundice  
Your Care Team  
Attending Physician -  
Schwab FNP, Jodi L  
Primary Care Physician   
-  
Baldomero WAGNER, Live CHAN  
This Is Your   
Medications List  
cetirizine (cetirizine   
10 mg oral capsule)  
donepezil (donepezil   
10 mg Tab)  
memantine (memantine 5   
mg Tab)  
Procedures Performed  
Shunt ().  
What to do next  
Scheduled Follow-Up   
Appointments  
Tuesday Dec. 5, 2023   
2:40 PM EST  
Where: [facility]  
c Noteon 2023   
   
                                                    Family Medicine   
Office/Clinic Note                      HPI Staff  
Tawanda is a 67 year   
old male presenting   
for acute visit  
Pt Jaundice  
Assessment/Plan  
1. Jaundice (R17:   
Unspecified jaundice)  
pt presents for office   
visit pt is visibly   
severely jaundice. pt   
caregiver was advised   
to take him to ER. pt   
was not seen for a   
visit. just sent to ER  
Follow-up  
No qualifying data   
available  
Problem List/Past   
Medical History  
Ongoing  
Allergies  
Fatigue  
Head injury  
Jaundice  
Nocturia  
Screening for   
hyperlipidemia  
Screening for prostate   
cancer  
Sleep disorder  
Wellness examination  
Historical  
No qualifying data  
Procedure/Surgical   
History  
Shunt ().  
Medications  
cetirizine 10 mg oral   
capsule, 10 mg= 1   
cap(s), Oral, Daily,   
PRN, 2 refills  
donepezil 10 mg Tab,   
10 mg= 1 tab(s), Oral,   
Once a day (at   
bedtime), 2 refills  
memantine 5 mg Tab, 5   
mg= 1 tab(s), Oral,   
Daily, 2 refills  
Allergies  
No Known Medication   
Allergies  
Social History  
Tobacco  
Former smoker, quit   
more than 30 days ago   
Tobacco Use:.   
Cigarettes, Household   
tobacco concerns: No.,   
2023  
Immunizations  
Vaccine Date Status  
diphtheria/pertussis,   
acel/tetanus adult   
2020 Recorded  
influenza virus   
vaccine, inactivated   
2017 Recorded  
influenza virus   
vaccine, inactivated   
10/23/2015 Recorded Normal                                  [facility]  
   
                                        Comment on above:   Result Comment: Elec  
tronically Signed By: Schwab FNP, Jodi L\.br\Date and Time Signed: 23 16:13 EST   
   
                                                    Reminderson 10-   
   
                                        Reminders           --------------------  
-  
From: Schwab FNP, Jodi L  
To: [facility] - Clinical;  
Sent: 10/30/2023   
13:43:18 EDT Show up:   
10/30/2023 13:43:00   
EDT  
Subject: Ambulatory   
Reminder  
Due Date/Time:   
10/31/2023 13:42:00   
EDT  
Let Tawanda's caregiver   
know his PSA was just   
slightly elevated. I   
would like to repeat   
that in 6-8 weeks. IF   
it is still elevated   
then, I will refer to   
urology. Otherwise his   
labs are all normal  
Results:  
Date Result Name Ind   
Value Ref Range  
10/27/2023 9:13 WBC   
8.3 E9/L (4.0 - 11.0)  
10/27/2023 9:13 RBC   
5.1 E12/L (4.3 - 5.9)  
10/27/2023 9:13 HGB   
15.5 gm/dL (13.5 -   
17.5)  
10/27/2023 9:13 Hct   
45.8 % (37.7 - 49.0)  
10/27/2023 9:13 MCV   
89.6 fL (80.0 - 100.0)  
10/27/2023 9:13 MCH   
30.3 pg (27.0 - 34.0)  
10/27/2023 9:13 MCHC   
33.9 gm/dL (31.4 -   
36.0)  
10/27/2023 9:13 RDW   
((H)) 14.4 % (10.9 -   
14.2)  
10/27/2023 9:13   
Platelet 339.0 E9/L   
(150.0 - 500.0)  
10/27/2023 9:13 MPV   
8.2 fL (6.4 - 10.8)  
10/27/2023 9:13 Neutro   
Auto 66.3 % (36.0 -   
75.0)  
10/27/2023 9:13 Lymph   
Auto 21.4 % (14.0 -   
50.0)  
10/27/2023 9:13 Mono   
Auto 9.0 % (4.0 -   
14.0)  
10/27/2023 9:13 Eos   
Auto 3.0 % (0.0 - 8.0)  
10/27/2023 9:13   
Basophil Auto 0.3 %   
(0.0 - 2.0)  
10/27/2023 9:13 Neutro   
Absolute 5.5 E9/L (2.0   
- 7.5)  
10/27/2023 9:13 Lymph   
Absolute 1.8 E9/L (1.0   
- 4.0)  
10/27/2023 9:13 Charlton   
Absolute 0.7 E9/L (0.2   
- 1.0)  
10/27/2023 9:13 Eos   
Absolute 0.3 E9/L (0.0   
- 0.5)  
10/27/2023 9:13   
Basophil Absolute 0.0   
E9/L (0.0 - 0.2)  
10/27/2023 9:13   
Glucose Lvl 86 mg/dL   
(55 - 199)  
10/27/2023 9:13 BUN 13   
mg/dL (5 - 21)  
10/27/2023 9:13   
Creatinine 0.9 mg/dL   
(0.5 - 1.3)  
10/27/2023 9:13 eGFR   
95 mL/min/1.73 m2   
(>=59 - )  
10/27/2023 9:13   
BUN/Creat Ratio 14 (10   
- 20)  
10/27/2023 9:13 Sodium   
Lvl 137 mmol/L (135 -   
145)  
10/27/2023 9:13   
Potassium Lvl 3.8   
mmol/L (3.5 - 5.3)  
10/27/2023 9:13   
Chloride 108 mmol/L   
(101 - 111)  
10/27/2023 9:13 CO2 22   
mmol/L (21 - 31)  
10/27/2023 9:13 AGAP   
11 mEq/L (6 - 16)  
10/27/2023 9:13   
Calcium Lvl 9.1 mg/dL   
(8.9 - 11.1)  
10/27/2023 9:13 Alk   
Phos 65 Int._Unit/L   
(21 - 98)  
10/27/2023 9:13 ALT 21   
Int._Unit/L (6 - 46)  
10/27/2023 9:13 AST 20   
Int._Unit/L (5 - 43)  
10/27/2023 9:13 Total   
Protein 7.5 gm/dL (6.0   
- 7.8)  
10/27/2023 9:13   
Albumin Lvl 3.5 gm/dL   
(3.3 - 5.0)  
10/27/2023 9:13   
Globulin 4.0 gm/dL   
(1.4 - 4.0)  
10/27/2023 9:13 A/G   
Ratio ((L)) 0.9 (1.1 -   
2.2)  
10/27/2023 9:13 Bili   
Total 0.6 mg/dL (0.0 -   
1.1)  
10/27/2023 9:13 Chol   
157 mg/dL (120 - 200)  
10/27/2023 9:13 Trig   
63 mg/dL ( - <=149)  
10/27/2023 9:13 HDL 40   
mg/dL  
10/27/2023 9:13 LDL   
Direct 99 mg/dL ( -   
<=129)  
10/27/2023 9:13 VLDL   
13 mg/dL (7 - 40)  
10/27/2023 9:13 TSH   
2.09 mcIU/mL (0.34 -   
5.60)  
10/27/2023 9:13 PSA   
Scrn Tot. ((H)) 4.1   
ng/mL (0.1 - 3.5)  
spoke with caregiver   
Emily and advised her   
PSA was elevated and   
autumn would like to re   
check the levels in   
6-8 weeks if still   
elevated will refer to   
Urology. Caregiver   
transferred to front   
desk to schedule lab   
draw.               Normal                                  [facility]  
   
                                                    Ambulatory Visit Summaryon 1  
   
   
                                                    Ambulatory Visit   
Summary                                 [Image Removed]  
TAWANDA VILLARREAL  
:1955  
MRN:[id number]  
Visit Date:10/27/2023  
Ambulatory Visit   
Instructions  
Your Diagnosis  
Wellness examination  
Screening for   
hyperlipidemia  
Screening for prostate   
cancer  
Fatigue  
Nocturia  
Allergies  
Brain damage  
Head injury  
BMI 26.0-26.9,adult  
Former smoker  
Your Care Team  
Attending Physician -  
Schwab FNP, Jodi L  
Primary Care Physician   
-  
Live Alexandre MD  
This Is Your   
Medications List  
cetirizine (cetirizine   
10 mg oral capsule)  
donepezil (donepezil   
10 mg Tab)  
memantine (memantine 5   
mg Tab)  
Procedures Performed  
Shunt (2013).  
Discharge Vitals  
Heart Rate   
(Peripheral)  
68  
Respiratory Rate  
18  
Blood Pressure  
132/88  
Height  
177 cm  
Height  
70 in  
Weight  
83.10 kg  
Weight  
182.82 lb  
BMI  
26.52  
What to do next  
You Need to Complete   
the Following  
CBC w/ Auto Diff,   
Blood, Routine   
collect, 10/27/23,   
Order for future   
visit, Lab Collect,   
Wellness examination                    Invalid   
Interpretation   
Code                                    Screening for   
prostate cancer                         [facility]  
   
                                                    Auto Diffon 10-   
   
                                                    Basophils/100 WBC   
(Bld)           0.3 %           Normal          0.0-2.0         [facility]  
   
                                        Comment on above:   Order Comment: Order  
 Added by Discern Expert.   
   
                                                            Performed By: #### 2  
551937, 0619600, 6720017, 5308855, 57366883,   
31216324, 8801347 ####[id number]   
[place] [address]   
   
                                                    Basophils/Leukocyte  
s Auto (Bld) [Pure   
# fraction]     0.0 E9/L        Normal          0.0-0.2         [facility]  
   
                                        Comment on above:   Order Comment: Order  
 Added by Discern Expert.   
   
                                                            Performed By: #### 2  
830743, 3097290, 4972623, 8684034, 53929970,   
91506227, 5757640 ####[address]   
[place] [address]   
   
                                                    Eosinophils/100 WBC   
(Bld)           3.0 %           Normal          0.0-8.0         [facility]  
   
                                        Comment on above:   Order Comment: Order  
 Added by Discern Expert.   
   
                                                            Performed By: #### 2  
411575, 4498560, 4154310, 4988940, 27096796,   
54523954, 2680902 ####[address] [address]   
   
                                                    Eosinophils/Leukocy  
kenneth Auto (Bld)   
[Pure # fraction] 0.3 E9/L        Normal          0.0-0.5         [facility]  
   
                                        Comment on above:   Order Comment: Order  
 Added by Discern Expert.   
   
                                                            Performed By: #### 2  
865741, 9953690, 1135547, 4483929, 78595952,   
36096518, 3525630 ####[address]   
[place] [address]   
   
                                                    Lymphocytes/100 WBC   
(Bld)           21.4 %          Normal          14.0-50.0       [facility]  
   
                                        Comment on above:   Order Comment: Order  
 Added by Ulysses Expert.   
   
                                                            Performed By: #### 2  
727881, 9110483, 3665151, 5859224, 99861278,   
57130790, 0067437 ####[address]   
[place] [address]   
   
                                                    Lymphocytes/Leukocy  
kenneth Auto (Bld)   
[Pure # fraction] 1.8 E9/L        Normal          1.0-4.0         [facility]  
   
                                        Comment on above:   Order Comment: Order  
 Added by Discern Expert.   
   
                                                            Performed By: #### 2  
385915, 3972854, 8521907, 8815909, 20065506,   
72728339, 6133338 ####[address]   
[place] [address]   
   
                                                    Monocytes/100 WBC   
(Bld)           9.0 %           Normal          4.0-14.0        [facility]  
   
                                        Comment on above:   Order Comment: Order  
 Added by Discern Expert.   
   
                                                            Performed By: #### 2  
183239, 0198985, 2878423, 5298059, 74972840,   
99309143, 9310043 ####[address] [address]   
   
                                                    Monocytes/Leukocyte  
s Auto (Bld) [Pure   
# fraction]     0.7 E9/L        Normal          0.2-1.0         [facility]  
   
                                        Comment on above:   Order Comment: Order  
 Added by Discern Expert.   
   
                                                            Performed By: #### 2  
294002, 0708647, 6195890, 9911519, 71961975,   
67478549, 1480415 ####[address]   
[place] [address]   
   
                                                    Neutrophils/100 WBC   
(Bld)           66.3 %          Normal          36.0-75.0       [facility]  
   
                                        Comment on above:   Order Comment: Order  
 Added by Discern Expert.   
   
                                                            Performed By: #### 2  
801522, 7051759, 5664517, 1038974, 07263417,   
29367419, 4824538 ####[address]   
[place] [address]   
   
                                                    Neutrophils/Leukocy  
kenneth Auto (Bld)   
[Pure # fraction] 5.5 E9/L        Normal          2.0-7.5         [facility]  
   
                                        Comment on above:   Order Comment: Order  
 Added by Discern Expert.   
   
                                                            Performed By: #### 2  
983227, 6742348, 1103259, 6632616, 16519761,   
15000070, 9278122 ####[facility] [id number]   
[place] [address]   
   
                                                    CBC w/ Auto Diffon 10-  
3   
   
                                                    Erythrocyte   
distribution width   
(RBC) [Ratio]   14.4 %          High            10.9-14.2       [facility]  
   
                                        Comment on above:   Performed By: #### 2  
844206, 3535524, 6583310, 1426583,   
12565712,   
05347965, 8656121 ####[address]   
[place] [address]   
   
                                                    Hematocrit (Bld)   
[Volume fraction] 45.8 %          Normal          37.7-49.0       [facility]  
   
                                        Comment on above:   Performed By: #### 2  
578248, 8571599, 9294031, 0543612,   
87081630,   
71944743, 1843715 ####[address] [address]   
   
                                                    Hemoglobin (Bld)   
[Mass/Vol]      15.5 g/dL       Normal          13.5-17.5       [facility]  
   
                                        Comment on above:   Performed By: #### 2  
600642, 1461685, 3396253, 2035842,   
68101236,   
37096022, 1452866 ####[address] [address]   
   
                                                    MCH (RBC) [Entitic   
mass]           30.3 pg         Normal          27.0-34.0       [facility]  
   
                                        Comment on above:   Performed By: #### 2  
046938, 3354771, 7654327, 7283577,   
01494384,   
60909766, 0883150 ####[address] [address]   
   
                                                    MCHC (RBC)   
[Mass/Vol]      33.9 g/dL       Normal          31.4-36.0       [facility]  
   
                                        Comment on above:   Performed By: #### 2  
673180, 7517851, 3540512, 6260798,   
49143925,   
67063908, 5098056 ####[address] [address]   
   
                                                    MCV (RBC) [Entitic   
vol]            89.6 fL         Normal          80.0-100.0      [facility]  
   
                                        Comment on above:   Performed By: #### 2  
883435, 8539962, 6964927, 2250146,   
66314713,   
97862906, 6697295 ####[facility] [id number]   
[place] [address]   
   
                                                    Platelet mean   
volume (Bld)   
[Entitic vol]   8.2 fL          Normal          6.4-10.8        [facility]  
   
                                        Comment on above:   Performed By: #### 2  
396652, 8327151, 6270351, 9941211,   
52078727,   
73135018, 1437326 ####[facility] [id number]   
[place] [address]   
   
                                                    Platelets (Bld)   
[#/Vol]         339.0 E9/L      Normal          150.0-500.0     [facility]  
   
                                        Comment on above:   Performed By: #### 2  
960360, 2222262, 9460818, 7486955,   
47825313,   
22248753, 1294212 ####[facility] [address] [place] [address]   
   
                      RBC (Bld) [#/Vol] 5.1 E12/L  Normal     4.3-5.9    [facility]  
   
                                        Comment on above:   Performed By: #### 2  
812308, 6564436, 0656137, 7606355,   
95751927,   
93439534, 0704260 ####[facility] [id number]   
[place] [address]   
   
                                                    WBC corrected for   
nucl RBC Auto (Bld)   
[#/Vol]         8.3 E9/L        Normal          4.0-11.0        [facility]  
   
                                        Comment on above:   Performed By: #### 2  
381021, 2313160, 0990817, [id number],   
[id number],   
[id number], [id number] ####[facility] [address]   
[place] [address]   
   
                                                    CHEMISTRYOrdered By: SYSTEM   
SYSTEM on 10-   
   
                      Albumin [Mass/Vol] 3.5 g/dL   Normal     3.3 - 5.0 gm/dL F  
[facility]   
Remisol  
   
                                                    Albumin/Globulin   
[Mass ratio]    0.9 {ratio}     Low             1.1 - 2.2       [facility]   
Remisol  
   
                                                    ALP [Catalytic   
activity/Vol]       65 [iU]/d           Normal              21 - 98   
Int._Unit/L                             [facility]   
Remisol  
   
                                                    ALT No additional   
P-5'-P [Catalytic   
activity/Vol]       21 [iU]/d           Normal              6 - 46   
Int._Unit/L                             [facility]   
Remisol  
   
                                                    Anion gap   
[Moles/Vol]     11 mmol/L       Normal          6 - 16 mEq/L    [facility]   
Remisol  
   
                                                    AST [Catalytic   
activity/Vol]       20 [iU]/d           Normal              5 - 43   
Int._Unit/L                             [facility]   
Remisol  
   
                                                    Bilirubin   
[Mass/Vol]      0.6 mg/dL       Normal          0.0 - 1.1 mg/dL [facility]   
Remisol  
   
                      Calcium [Mass/Vol] 9.1 mg/dL  Normal     8.9 - 11.1 mg/dL   
[facility]   
Remisol  
   
                                                    Chloride   
[Moles/Vol]     108 mmol/L      Normal          101 - 111 mmol/L [facility]   
Remisol  
   
                                                    Cholesterol   
[Mass/Vol]      157 mg/dL       Normal          120 - 200 mg/dL [facility]   
Remisol  
   
                                                    Cholesterol in HDL   
[Mass/Vol]                40 mg/dL                  Invalid   
Interpretation   
Code                                                [facility]   
Remisol  
   
                                        Comment on above:   Interpretive Data: H  
DL > or equal to 60 mg/dL: Low   
cardiovascular   
risk  
HDL < 40 mg/dL : High cardiovascular risk   
   
                                                    Cholesterol in LDL   
[Mass/Vol]      99 mg/dL        Normal          <=129mg/dL      [facility]   
Remisol  
   
                                                    Cholesterol in VLDL   
[Mass/Vol]      13 mg/dL        Normal          7 - 40 mg/dL    [facility]   
Remisol  
   
                      CO2 [Moles/Vol] 22 mmol/L  Normal     21 - 31 mmol/L [facility]   
Remisol  
   
                                                    Creatinine   
[Mass/Vol]      0.9 mg/dL       Normal          0.5 - 1.3 mg/dL [facility]   
Remisol  
   
                                                    GFR/1.73 sq   
M.predicted among   
non-blacks MDRD   
(S/P/Bld) [Vol   
rate/Area]          95 mL/min/1.73 m2   Normal              >=59mL/min/1.73   
m2                                      [facility]  
   
                                        Comment on above:   Interpretive Data: C  
hronic kidney disease could be indicated   
at   
eGFR's of less than 60 mL/min/1.73m2. Kidney failure is indicated   
at less than 15 mL/min/1.73m2.   
   
                                                    Globulin (S)   
[Mass/Vol]      4.0 g/dL        Normal          1.4 - 4.0 gm/dL [facility]   
Remisol  
   
                      Glucose [Mass/Vol] 86 mg/dL   Normal     55 - 199 mg/dL [facility]  
   
Remisol  
   
                                        Comment on above:   Interpretive Data: I  
f this glucose result represents a fasting  
   
glucose, interpretation should refer to the following reference   
range: 55-99 mg/dL   
   
                                                    Potassium   
[Moles/Vol]     3.8 mmol/L      Normal          3.5 - 5.3 mmol/L [facility]   
Remisol  
   
                                                    Prostate specific   
Ag [Mass/Vol]   4.1 ng/mL       High            0.1 - 3.5 ng/mL [facility]   
Remisol  
   
                                        Comment on above:   Interpretive Data: T  
he concentration of PSA determined by   
different manufacturers can vary due to differences in assay   
methods and reagent specificity. Values obtained from different   
assay methods cannot be used interchangeably. The methodology used   
for this result was chemiluminescence using [employer]'s   
[employer] PSA reagent.   
   
                      Protein [Mass/Vol] 7.5 g/dL   Normal     6.0 - 7.8 gm/dL F  
[facility]   
Remisol  
   
                      Sodium [Moles/Vol] 137 mmol/L Normal     135 - 145 mmol/L   
[facility]   
Remisol  
   
                                                    Triglyceride   
[Mass/Vol]      63 mg/dL        Normal          <=149mg/dL      [facility]   
Remisol  
   
                          TSH Qn       2.09 m[IU]/L Normal       0.34 - 5.60   
mcIU/mL                                 [facility]   
Remisol  
   
                                                    Urea nitrogen   
[Mass/Vol]      13 mg/dL        Normal          5 - 21 mg/dL    [facility]   
Remisol  
   
                                                    Urea   
nitrogen/Creatinine   
[Mass ratio]    14 mg/mg        Normal          10 - 20         [facility]   
Remisol  
   
                                                    CMPon 10-   
   
                      Albumin [Mass/Vol] 3.5 g/dL   Normal     3.3-5.0    [facility]  
   
                                        Comment on above:   Performed By: #### 2  
009644, 7369203, 1444938, 5566836,   
22558069,   
22007918, 0801372 ####[facility] [address]   
[place] [address]   
   
                                                    Albumin/Globulin   
(S) [Mass conc   
ratio]          0.9             Low             1.1-2.2         [facility]  
   
                                        Comment on above:   Performed By: #### 2  
925460, 4896324, 8152963, 9084950,   
05898660,   
78760410, 1154569 ####[facility] [address]   
[place] [address]   
   
                                                    ALP [Catalytic   
activity/Vol]   65 Int._Unit/L  Normal          21-98           [facility]  
   
                                        Comment on above:   Performed By: #### 2  
077708, 9270405, 2161973, 9478799,   
37945134,   
14535691, 2217556 ####[facility] [address]   
[place] [address]   
   
                                                    ALT No additional   
P-5'-P [Catalytic   
activity/Vol]   21 Int._Unit/L  Normal          6-46            [facility]  
   
                                        Comment on above:   Performed By: #### 2  
001047, 1256468, 5073479, 1731026,   
82895286,   
57156417, 5160231 ####[facility] [address]   
[place] [address]   
   
                                                    Anion gap   
[Moles/Vol]     11 mmol/L       Normal          6-16            [facility]  
   
                                        Comment on above:   Performed By: #### 2  
942763, 4097509, 9217634, 7369258,   
04331320,   
86051913, 2542808 ####[facility] [address]   
[place] [address]   
   
                                                    AST [Catalytic   
activity/Vol]   20 Int._Unit/L  Normal          5-43            [facility]  
   
                                        Comment on above:   Performed By: #### 2  
434385, 5571661, 9809960, 0353283,   
86319766,   
82325967, 6957766 ####[facility] [address]   
[place] [address]   
   
                                                    Bilirubin   
[Mass/Vol]      0.6 mg/dL       Normal          0.0-1.1         [facility]  
   
                                        Comment on above:   Performed By: #### 2  
819253, 0914676, 4976520, 1236022,   
35213232,   
25201579, 8418618 ####[facility] [address]   
[place] [address]   
   
                      Calcium [Mass/Vol] 9.1 mg/dL  Normal     8.9-11.1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 ####  
[facility]  
[address] [place] [address]  
Dr. Emani Petty   
   
                      WBC        6.3 103/ul Normal     4.0-11.0   [facility]   
[facility]  
   
                                        Comment on above:   Performed By: #### C  
BC ####  
[facility]  
[address] [place] [address]  
Dr. Emani Petty   
   
                                                    LIPID PROFILEon 2022   
   
                      CHOL-HDL RATIO NORM SEE BELOW  Normal                The   
[facility]  
   
                                        Comment on above:   Result Comment: 3.3   
- 4.4 LOW RISK 4.4 - 7.1 AVERAGE RISK 7.1   
-   
11.0 MODERATE RISK >11.0 HIGH RISK   
   
                                                            Performed By: #### T  
4, CMP, LIPID, TSH ####  
[facility]  
[address] [place] [address]  
Dr. Emani Petty   
   
                                                    Cholesterol   
[Mass/Vol]      167 mg/dL       Normal          <=200           [facility]  
   
                                        Comment on above:   Performed By: #### T  
4, CMP, LIPID, TSH ####  
[facility]  
[address] [address]  
Dr. Emani Petty   
   
                                                    Cholesterol in HDL   
[Mass/Vol]      53 mg/dL        Normal                          [facility]  
   
                                        Comment on above:   Performed By: #### T  
4, CMP, LIPID, TSH ####  
[facility]  
[address] [address]  
Dr. Emani Petty   
   
                                                    Cholesterol in LDL   
[Mass/Vol]      97.2 mg/dL      Normal                          [facility]  
   
                                        Comment on above:   Performed By: #### T  
4, CMP, LIPID, TSH ####  
[facility]  
[address] [address]  
Dr. Emani Petty   
   
                                                    Cholesterol.total/C  
holesterol in HDL   
[Mass ratio]    3.2 {ratio}     Normal                          [facility]  
   
                                        Comment on above:   Performed By: #### T  
4, CMP, LIPID, TSH ####  
[facility]  
[address] [address]  
Dr. Emani Petty   
   
                                        HDL NORMAL          > or = 60 mg/dl - LO  
W   
CARDIOVASCULAR RISK   
<40 mg/dl - HIGH   
CARDIOVASCULAR RISK Normal                                  [facility]  
   
                                        Comment on above:   Performed By: #### T  
4, CMP, LIPID, TSH ####  
[facility]  
[address] [address]  
Dr. Emani Petty   
   
                      LDL CALC NORMAL SEE BELOW  Normal                The   
[facility]  
   
                                        Comment on above:   Result Comment: <100  
 mg/dl OPTIMAL 100 - 129 mg/dl NEAR OR   
ABOVE   
OPTIMAL 130 - 159 mg/dl BORDERLINE HIGH 160 - 189 mg/dl HIGH >190   
mg/dl VERY HIGH   
   
                                                            Performed By: #### T  
4, CMP, LIPID, TSH ####  
[facility]  
[address] [address]  
Dr. Emani Petty   
   
                                                    Triglyceride   
[Mass/Vol]      84 mg/dL        Normal          <=150           [facility]   
[facility]  
   
                                        Comment on above:   Performed By: #### T  
4, CMP, LIPID, TSH ####  
[facility]  
[address] [address]  
Dr. Emani Petty   
   
                      VLDL CALC  16.8 mg/dL Normal                [facility]  
   
                                        Comment on above:   Performed By: #### T  
4, CMP, LIPID, TSH ####  
[facility]  
[address] [address]  
Dr. Emani Petty   
   
                                                    PROF 14(COMP METB)on   
022   
   
                      Albumin [Mass/Vol] 3.6 g/dL   Normal     3.5-5.0    [facility]  
   
                                        Comment on above:   Performed By: #### T  
4, CMP, LIPID, TSH ####  
[facility]  
[address] [place] [address]  
Dr. Emani Petty   
   
                                                    Albumin/Globulin   
[Mass ratio]    0.9 {ratio}     Normal                          [facility]  
   
                                        Comment on above:   Performed By: #### T  
4, CMP, LIPID, TSH ####  
[facility]  
[address] [place] [address]  
Dr. Emani Petty   
   
                                                    ALP [Catalytic   
activity/Vol]   63 U/L          Normal                    [facility]  
   
                                        Comment on above:   Performed By: #### T  
4, CMP, LIPID, TSH ####  
[facility]  
[address] [place] [address]  
Dr. Emani Petty   
   
                                                    ALT [Catalytic   
activity/Vol]   42 U/L          Normal          21-72           [facility]   
[facility]  
   
                                        Comment on above:   Performed By: #### T  
4, CMP, LIPID, TSH ####  
[facility]  
[address] [place] [address]  
Dr. Emani Petty   
   
                                                    Anion gap   
[Moles/Vol]     11.3 mmol/L     Normal                          [facility]  
   
                                        Comment on above:   Performed By: #### T  
4, CMP, LIPID, TSH ####  
[facility]  
[address] [place] [address]  
Dr. Emani Petty   
   
                                                    AST [Catalytic   
activity/Vol]   27 U/L          Normal          17-59           [facility]   
[facility]  
   
                                        Comment on above:   Performed By: #### T  
4, CMP, LIPID, TSH ####  
[facility]  
[address] [place] [address]  
Dr. Emani Petty   
   
                                                    Bilirubin   
[Mass/Vol]      0.7 mg/dL       Normal          0.2-1.3         [facility]   
[facility]  
   
                                        Comment on above:   Performed By: #### T  
4, CMP, LIPID, TSH ####  
[facility]  
[address] [place], [place] [id number]  
Dr. Emani Petty   
   
                      Calcium [Mass/Vol] 9.0 mg/dL  Normal     8.4-10.2   [facility]   
[facility]  
   
                                        Comment on above:   Performed By: #### T  
4, CMP, LIPID, TSH ####  
[facility]  
[address] [place] [address]  
Dr. Emani Petty   
   
                                                    Chloride   
[Moles/Vol]     107 mmol/L      Normal                    The   
[facility]  
   
                                        Comment on above:   Performed By: #### T  
4, CMP, LIPID, TSH ####  
[facility]  
[address] [place] [address]  
Dr. Emani Petty   
   
                      CO2 [Moles/Vol] 26.2 mmol/L Normal     22.0-30.0  The   
[facility]  
   
                                        Comment on above:   Performed By: #### T  
4, CMP, LIPID, TSH ####  
[facility]  
[address] [place] [address]  
Dr. Emani Petty   
   
                                                    Creatinine   
[Mass/Vol]      1.06 mg/dL      Normal          0.66-1.25       The   
[facility]  
   
                                        Comment on above:   Performed By: #### T  
4, CMP, LIPID, TSH ####  
[facility]  
[address] [place] [address]  
Dr. Emani Petty   
   
                      EGFR-AF AMERICAN >60        Normal     >=60       [facility]   
[facility]  
   
                                        Comment on above:   Performed By: #### T  
4, CMP, LIPID, TSH ####  
[facility]  
[address] [place] [address]  
Dr. Emani Petty   
   
                                                    EGFR-NON AF   
AMERICAN        >60             Normal          >=60            The   
[facility]  
   
                                        Comment on above:   Performed By: #### T  
4, CMP, LIPID, TSH ####  
[facility]  
[address] [place] [address]  
Dr. Emani Petty   
   
                                                    Globulin (S)   
[Mass/Vol]      4.0 g/dL        Normal                          The   
[facility]  
   
                                        Comment on above:   Performed By: #### T  
4, CMP, LIPID, TSH ####  
[facility]  
[address] [place] [address]  
Dr. Emani Petty   
   
                      Glucose [Mass/Vol] 91 mg/dL   Normal          The   
[facility]  
   
                                        Comment on above:   Performed By: #### T  
4, CMP, LIPID, TSH ####  
[facility]  
[address] [place] [address]  
Dr. Emani Petty   
   
                                                    Potassium   
[Moles/Vol]     4.5 mmol/L      Normal          3.4-5.0         [facility]  
   
                                        Comment on above:   Performed By: #### T  
4, CMP, LIPID, TSH ####  
[facility]  
[address] [place] [address]  
Dr. Emani Petty   
   
                      Protein [Mass/Vol] 7.6 g/dL   Normal     6.1-8.2    The   
[facility]  
   
                                        Comment on above:   Performed By: #### T  
4, CMP, LIPID, TSH ####  
[facility]  
[address] [place] [address]  
Dr. Emani Petty   
   
                      Sodium [Moles/Vol] 140 mmol/L Normal     137-145    The   
[facility]  
   
                                        Comment on above:   Performed By: #### T  
4, CMP, LIPID, TSH ####  
[facility]  
[address] [place] [address]  
Dr. Emani Petty   
   
                                                    Urea nitrogen   
[Mass/Vol]      12.0 mg/dL      Normal          9.0-20.0        [facility]  
   
                                        Comment on above:   Performed By: #### T  
4, CMP, LIPID, TSH ####  
[facility]  
[address] [place] [address]  
Dr. Emani Petty   
   
                                                    Urea   
nitrogen/Creatinine   
[Mass ratio]    11.3 mg/mg      Normal                          The   
[facility]  
   
                                        Comment on above:   Performed By: #### T  
4, CMP, LIPID, TSH ####  
[facility]  
[address] [place] [address]  
Dr. Emani Petty   
   
                                                    T4on 2022   
   
                      T4 [Mass/Vol] 11.90 ug/dL Critically high 5.53-11.00 [facility]  
   
                                        Comment on above:   Performed By: #### T  
4, CMP, LIPID, TSH ####  
[facility]  
[address] [place] [address]  
Dr. Emani Petty   
   
                                                    TSHon 2022   
   
                      TSH        1.352 uIU/mL Normal     0.470-4.680 [facility]   
[facility]  
   
                                        Comment on above:   Performed By: #### T  
4, CMP, LIPID, TSH ####  
[facility]  
[address], [place] [address]  
Dr. Emani Petty   
   
                      TSH RANGE  SEE BELOW  Normal                The   
[facility]  
   
                                        Comment on above:   Result Comment: <0.3  
4 UIU/ml HYPERTHYROID 0.34-5.60 UIU/ml   
EUTHYROID >5.60 UIU/ml HYPOTHYROID   
   
                                                            Performed By: #### T  
4, CMP, LIPID, TSH ####  
[facility]  
[address], [place] [address]  
Dr. Emani Petty   
  
  
  
Vital Signs  
  
  
                      Date Time  Vital Sign Value      Performing Clinician Faci  
lity  
   
                                                    2024   
10:12050          Body height         185.4 cm            Sherry Gomez DPM  
Work Phone:   
[phone]                          [facility]  
   
                                                    2024   
10:                              Body mass index   
(BMI) [Ratio]             24.14 kg/m2               Sherry Gomez DPM  
Work Phone:   
[phone]                          [facility]  
   
                                                    2024   
10:12050          Body weight         83.01 kg            Sherry Gomez DPM  
Work Phone:   
[phone]                          [facility]  
  
  
  
Encounters  
  
  
                          Encounter Date Encounter Type Care Provider Facility  
   
                          Start: 2025 ambulatory   Autumn L Schwab Facility:  
[facility]  
   
                          Start: 2025 ambulatory   Autumn L Schwab Facility:  
[facility]  
   
                                                    Start: 2024  
End: 2024     ambulatory          Autumn L Schwab       Facility:[facility]  
   
                          Start: 2024 ambulatory   Autumn Schwab  Facility:[facility]  
   
                                                    Start: 2024  
End: 2024     ambulatory          Autumn L Schwab       [facility]  
   
                          Start: 2024 ambulatory   Autumn L Schwab [facility]  
[facility]  
   
                                                    Start: 2024  
End: 2024     ambulatory          Autumn L Schwab       Facility:[facility]  
   
                                                    Start: 2024  
End: 2024           Lab Drop off              Autumn L Schwab  
Work Phone:   
[phone]                           [facility]  
Work Phone:   
[phone]  
   
                                                    Start: 2024  
End: 2024     ambulatory          Autumn L Schwab       Facility:[facility]  
   
                                                    Start: 2024  
End: 2024     ambulatory          Autumn L Schwab       Facility:[facility]  
   
                                Start: 2024 Bamboo flowsheet Sherry Gao  
her DPM  
Work Phone:   
[phone]                          [facility]  
   
                                Start: 2024 Bamboo flowsheet Sherry Gao  
her DPM  
Work Phone:   
[phone]                          [facility]  
   
                                                    Start: 2024  
End: 2024     ambulatory          SHERRY GOMEZ    Not Available  
   
                                                    Start: 2024  
End: 2024                         Patient encounter   
procedure                               Sherry Gomez DPM  
Work Phone:   
[phone]                          [facility]  
   
                                        Comment on above:   Onychomycosis (Prima  
ry Dx);  
Onychodystrophy;  
Pain in both feet   
   
                          Start: 2023 ambulatory   Live Alexandre Facility  
:[facility]  
   
                                                    Start: 2023  
End: 2023     ambulatory          Jodi L Schwab       Facility:[facility]  
   
                                                    Start: 2023  
End: 2023                         Evaluation and management   
of inpatient              [facility]  
   
                                                    Start: 10-  
End: 10-           Lab Drop off              Jodi L Schwab  
Work Phone:   
[phone]                           [facility]  
Work Phone:   
[phone]  
   
                                                    Start: 10-  
End: 10-     ambulatory          Jodi L Schwab       Facility:[facility]  
   
                          Start: 10- ambulatory   Live Alexandre Facility  
:[facility]  
   
                          Start: 2023 Letter encounter              [facility]  
   
                          Start: 2023 Letter encounter              [facility]  
   
                          Start: 2022 Letter encounter              [facility]  
   
                                                    Start: 2022  
End: 2022     ambulatory          DR INDERJIT CARDENSA    Facility:  
   
                          Start: 10- Patient encounter status              
  [facility]  
Work Phone:   
[phone]  
  
  
  
Procedures  
  
  
                          Date         Procedure    Procedure Detail Performing   
Clinician  
   
                                        Start: 2013   Surgical fistula   
(morphologic abnormality)                           Jodi Schwab  
Work Phone:   
[phone]  
   
                                       Cholecystectomy              Jodi Schwab  
Work Phone:   
[phone]  
  
  
  
Plan of Treatment  
  
  
                          Date         Care Activity Detail       Author  
   
                                                    Start: 2024  
End: 2024                         Patient encounter   
procedure                               2024 10:15 AM EST   
Procedure Visit [facility]   
PODIATRY [address] [place] [id number] [phone] Sherry Gomez, DPSIMONE [address] [place], [place] [id number] [phone] (Work)   
[phone] (Fax)   
Arrived                                 [facility]  
   
                                        Comment on above:   Arrived   
   
                          Start: 2023 Influenza vaccination Influenza Vacc  
ine (#1) [facility]  
   
                                        Start: 2022   Abdominal aortic ane  
urysm   
screening                               Abdominal aortic aneurysm   
scan                                    [facility]  
   
                                        Start: 2022   Abdominal Aortic Ane  
urysm   
Screening (Age 65+)                     Abdominal Aortic Aneurysm   
Screening (Age 65+)                     [facility]  
   
                                Start: 2022 Pneumococcal vaccination Pneum  
ococcal Vaccine(s)   
(65+ yrs) (1 - PCV)                     [facility]  
   
                          Start: 10- Influenza vaccination Influenza Vacc  
ine (#1) [facility]  
   
                                        Start: 2017   RSV vaccine (optiona  
l 60+   
years)                                  RSV vaccine (optional 60+   
years)                                  [facility]  
   
                                Start: 2016 Annual wellness visit Annual W  
ellness Visit   
()                                 [facility]  
   
                                        Start: 2007   Measurement of occul  
t   
blood in single stool   
specimen                  FIT                       [facility]  
   
                                        Start: 2007   Screening for malign  
ant   
neoplasm of colon         CRC Screening             [facility]  
   
                                        Start: 2007   Shingles (RZV) Vacci  
ne (1   
of 2)                                   Shingles (RZV) Vaccine (1   
of 2)                                   [facility]  
   
                                        Start: 2002   Screening for malign  
ant   
neoplasm of colon                                   [facility]  
   
                          Start: 1992 [facility]id [facility]  [facility]  [facility]  
[facility]  
   
                          Start: 1975 Hepatitis C screening Hepatitis C An  
tibody [facility]  
   
                                        Start: 1975   Tetanus + diphtheria  
 +   
acellular pertussis   
vaccine (product)         Tdap Booster              [facility]  
   
                          Start: 1958 COVID-19 Vaccine (#1) COVID-19 Vacci  
ne (#1) [facility]  
   
                                        Start: 1957   COVID-19 Vaccine   
([id number] formulation)                 COVID-19 Vaccine   
([id number] formulation)                 [facility]  
   
                                        Start: 1957   Screening for malign  
ant   
neoplasm of colon         Colonoscopy               [facility]  
  
  
  
Immunizations  
  
  
                      Immunization Date Immunization Notes      Care Provider Kathy  
peterjuni  
   
                                        2020          tetanus toxoid,   
reduced diphtheria   
toxoid, and acellular   
pertussis vaccine,   
adsorbed                                            Autumn Schwab  
Work Phone:   
[phone]                           [facility]  
   
                                        2017          influenza virus   
vaccine, unspecified   
formulation                                         Autumn Schwab  
Work Phone:   
[phone]                           [facility]  
   
                                        2017          influenza, injectabl  
e,   
quadrivalent,   
preservative free                                   Sherry Rusher DPM  
Work Phone:   
[phone]                          [facility]  
   
                                        10-          influenza, seasonal,  
   
injectable                                                  [facility]  
   
                                        10-          influenza, seasonal,  
   
injectable,   
preservative free                                   Sherry Rusher DPM  
Work Phone:   
[phone]                          [facility]  
   
                                        10-          influenza virus   
vaccine, unspecified   
formulation                                                 [facility]  
   
                                        2014          influenza, seasonal,  
   
injectable,   
preservative free                                           [facility]  
  
  
  
Payers  
  
  
                          Date         Payer Category Payer        Policy ID  
   
                          2015   Medicare                  [id number]  
[id number]  
[id number]  
   
                                2013      Unknown         [employer]HE[employer] - BLUE CR  
OSS BLUE   
CROSS/HMO,PPO,POS   
[id number]   
2013-Present [address]   
[id number] [place] [address] PPO            [id number]  
[id number]  
   
                          1960   Unknown                   [id number]  
   
                          1957   Unknown                   [id number]   
[id number]  
   
                          1957   Unknown                   [id number]   
[id number]  
   
                          1957   Unknown                   [id number]   
[id number]  
   
                          1957   Unknown                   [id number]   
[id number]   Unknown                   [id number]   
[id number]   Unknown                   [id number]   
[id number]   Unknown                   [id number]   
[id number]   Unknown                   [id number]   
[id number]   Unknown                   [id number]   
[id number]   Unknown                   [id number]   
[id number]   Unknown                   [id number]   
[id number]   Unknown                   [id number]   
[id number]   Unknown                   [id number]   
[id number]   Unknown                   [id number]   
[id number]   Unknown                   [id number]   
[id number]   Unknown                   [id number]   
[id number]   Unknown                   [id number]   
[id number]   Unknown                   [id number]   
[id number]  
  
  
  
Social History  
  
  
                          Date         Type         Detail       Facility  
   
                                                    Start: 02-  
End: 2024                         Tobacco smoking   
status NHIS               Ex-smoker                 [facility]  
Work Phone:   
[phone]  
   
                                                      
End: 2013                         History of tobacco   
use                       Current smoker            [facility]  
   
                                                    Start: 02-  
End: 2023                         Tobacco use and   
exposure                                Smokeless tobacco   
non-user                                [facility]  
   
                                Start: 02- Alcohol intake  Current non-dr  
 of   
alcohol (finding)                       [facility]  
   
                          Start: 1955 Sex Assigned At Birth Not on file  [facility]  
[facility]  
   
                                                    Start: 2023  
End: 2024     Gender identity     Not on file         [facility]  
   
                                                      
End: 2013                         History of tobacco   
use                       Cigarette Smoker          [facility]  
   
                                                    Start: 2023  
End: 2024           Alcohol intake            Lifetime non-drinker   
(finding)                               [facility]  
   
                                                    Start: 2023  
End: 2024                         History of Social   
function                                            [facility]  
  
  
  
History of Present illness Narrative 2024  
                 Sherry Gomez DPM - 2024 10:15 AM EST  
  
                                Note Date & Type Note            Facility  
   
                                                    2024 History of Presen  
t   
illness Narrative                       Formatting of this note is different   
from the original.  
Images from the original note were not   
included.  
  
  
Subjective  
Patient ID: Tawanda Villarreal is a 68   
y.o. male who presents for Nail Care   
(Pt presents today requesting nail   
care, he is with his fiance/SS: 12).  
  
HPI  
Established patient presents to clinic   
with concern of painful toenails.  
  
Review of Systems  
Constitutional: Negative for activity   
change and appetite change.  
Respiratory: Negative for chest   
tightness and shortness of breath.  
Cardiovascular: Positive for leg   
swelling. Negative for chest pain.  
Endocrine: Negative for cold   
intolerance and heat intolerance.  
Musculoskeletal: Positive for   
arthralgias.  
Skin: Negative for color change and   
wound.  
Allergic/Immunologic: Negative for   
immunocompromised state.  
Neurological: Negative for weakness   
and numbness.  
Hematological: Does not bruise/bleed   
easily.  
Psychiatric/Behavioral: Negative for   
agitation and behavioral problems.  
  
Medications  
  
Current Outpatient Medications:  
donepezil (Aricept) 5 MG tablet, Take   
5 mg by mouth at bedtime, Disp: , Rfl:  
memantine (Namenda) 5 MG tablet, Take   
by mouth, Disp: , Rfl:  
  
Allergies  
Patient has no known allergies.  
  
Past Surgical History  
Past Surgical History:  
Procedure Laterality Date  
CHOLECYSTECTOMY  
TOTAL KNEE ARTHROPLASTY Right  
  
Family History  
No family history on file.  
  
Objective  
Physical Exam  
Constitutional:  
General: He is not in acute distress.  
Comments: Accompanied by caregiver.  
HENT:  
Head: Normocephalic and atraumatic.  
Eyes:  
Comments: Right eyelid closed.  
Cardiovascular:  
Pulses: Normal pulses.  
Pulmonary:  
Effort: Pulmonary effort is normal. No   
respiratory distress.  
Musculoskeletal:  
Cervical back: Neck supple.  
Comments: No obvious muscle deficits.   
No significant pedal deformities.  
Skin:  
Capillary Refill: Capillary refill   
takes less than 2 seconds.  
Comments: 4 toenails exhibit clinical   
mycosis with thickened appearance,   
yellow discoloration, crumbly texture   
and subungual debris. All 10 toenails   
are elongated. Tenderness to   
palpation: Bilateral hallux  
Neurological:  
Mental Status: He is alert.  
Comments: No loss of protective   
sensation, gross sensation intact.  
Psychiatric:  
Mood and Affect: Mood normal.  
Behavior: Behavior normal.  
  
Assessment/Plan  
ICD-10-CM  
1. Onychomycosis B35.1  
  
2. Onychodystrophy L60.3  
  
3. Pain in both feet M79.671  
M79.672  
  
  
Patient was examined and evaluated. 10   
toenails were debrided in length and   
thickness today utilizing a nail   
nipper and electric bur    
without incident. Patient noted relief   
of symptomatology post-debridement. I   
have recommended moisturizing the feet   
on a daily basis and discussed proper   
pedal hygiene. Follow-up four months   
for continued palliative nail care.  
  
This note was created with the   
assistance of a speech recognition   
program. While intending to generate a   
timely document that accurately   
reflects the content of the visit, no   
guarantee can be provided that every   
grammatical or spelling mistake has   
been or will be identified or   
corrected. Thank you for your   
understanding.  
  
Sherry Gomez DPM  
Electronically signed by Sherry Gomez DPM at 2024 10:39 AM EST  
documented in this encounter            [facility]  
  
  
  
Evaluation + Plan note  
  
  
                                Note Date & Type Note            Facility  
   
                                        Evaluation + Plan note   
  
  
No data available for this   
section                                 [facility]  
  
  
  
Evaluation + Plan note  
                                   Laboratory  
  
                                Note Date & Type Note            Facility  
   
                                        Evaluation + Plan note   
  
  
Future Appointments  
  
  
Appointment Date:2024   
01:00:00 PM  
Scheduled Provider:  
Location:[facility]  
Appointment Type: Medicare   
Wellness Initial  
  
  
  
Appointment Date:2024   
02:40:00 PM  
Scheduled Provider:Schwab FNP, Jodi L  
Location:[facility]  
Appointment Type: Open  
  
  
  
Future Scheduled TestsPSA   
Screen, Total 10/31/23  
  
                                        [facility]  
  
  
  
Evaluation note  
  
  
                                Note Date & Type Note            Facility  
  
  
  
                                                    Evaluation note   
  
  
  
                                                    Diagnosis  
   
                                                      
  
  
Onychomycosis- Primary  
  
  
Dermatophytosis of nail  
   
                                                      
  
  
Onychodystrophy  
  
  
Other specified disease of nail  
   
                                                      
  
  
Pain in both feet  
  
documented in this encounter  
[facility]  
  
Hospital Discharge instructions  
  
  
                                Note Date & Type Note            Facility  
   
                                        Hospital Discharge instructions   
  
  
No data available for this   
section                                 [facility]  
  
  
  
Progress note  
  
  
                                Note Date & Type Note            Facility  
   
                                        Progress note         
  
  
No data available for this section      [facility]  
  
  
  
Summary Purpose  
  
  
                                                      
  
  
  
Family History  
No Family History Records Found  
  
No data available for this section  
  
No Family History Records FoundNo Family History Records FoundNo Family History 
Records Found  
  
No data available for this section  
  
No Family History Records Found  
  
Advance Directives  
No Advanced Directives Records FoundLatest Code Status on File  
  
                          Code Status  Date Activated Date Inactivated Comments  
   
                          Full Code    10/29/2013 7:58 PM 2013 2:18 PM   
  
  
  
                                                                   
   
                          Full Code    10/9/2013 12:52 PM 10/15/2013 6:01 PM   
  
  
  
                                                                   
   
                          Full Code    2013 9:59 PM 2013 12:53 PM   
  
                           Latest Code Status on File  
  
                          Code Status  Date Activated Date Inactivated Comments  
   
                          Full Code    10/29/2013 7:58 PM 2013 2:18 PM   
  
                               Code Status History  
  
                          Code Status  Date Activated Date Inactivated Comments  
   
                          Full Code    10/9/2013 12:52 PM 10/15/2013 6:01 PM   
   
                          Full Code    2013 9:59 PM 2013 12:53 PM   
  
  
  
Additional Source Comments  
  
  
  
                                                    PREGNANCY (unrecognized sect  
ion and content)  
   
                                                    No Pregnancy Status Records   
FoundNo Pregnancy Status Records FoundNo Pregnancy   
Status   
Records FoundNo Pregnancy Status Records FoundNo Pregnancy Status Records Found  
  
  
  
  
                                                    INFORMATION SOURCE (unrecogn  
ized section and content)  
   
                                          
  
  
  
                                        DATE CREATED        AUTHOR  
   
                                2022                      [facility]  
  
  
  
                                DATE CREATED    AUTHOR          AUTHOR'S ORGANIZ  
ATION  
   
                                2023                      [facility]  
  
  
  
                                DATE CREATED    AUTHOR          AUTHOR'S ORGANIZ  
ATION  
   
                                2024                      [facility]  
  
  
  
                                DATE CREATED    AUTHOR          AUTHOR'S ORGANIZ  
ATION  
   
                                2024                      [facility]  
[facility]  
  
  
  
                                DATE CREATED    AUTHOR          AUTHOR'S ORGANIZ  
ATION  
   
                                10/01/2024                      Kishor Nunez [facility]  
  
  
  
  
  
                                                    Patient Care team informatio  
n (unrecognized section and content)  
   
                                                      
  
  
Personnel  
  
  
Name: Live Alexandre MD  
Address:  
Address: [facility] [place] [address]-   
  
  
  
Personnel  
  
  
Name: Schwab FNP, Jodi L  
Address:  
Address: [address] [place] [address]-  
  
  
  
  
  
                                                    Reason for Visit (unrecogniz  
ed section and content)  
   
                                          
  
  
  
                                        Reason              Comments  
   
                                        Nail Care           Pt presents today re  
questing nail care, he is with his fianceSS: 12  
  
  
FOR RECORDS PERTAINING TO PATIENTS WHO ARE OR HAVE BEEN ENROLLED IN A CHEMICAL 
DEPENDENCY/SUBSTANCEABUSE PROGRAM, SOME INFORMATION MAY BE OMITTED. This 
clinical summary was aggregated from multiple sources. Caution should be 
exercised in using it in the provision of clinical care. This summary normalizes
 information from multiple sources, and as a consequence, information in this 
document may materially change the coding, format and clinical context of 
patient data. In addition, data may be omitted in some cases. CLINICAL DECISIONS
 SHOULD BE BASED ON THE PRIMARY CLINICAL RECORDS. [employer] provides
 no warranty or guarantee of the accuracy or completeness of information in this
 document.

## 2024-10-03 NOTE — US_ITS
[address] [address] 
     [phone] 
  
  
Patient Name: 
SANGITA BARRY 
  
MRN: [id number]    YOB: 1955    Sex: M 
Assigned Patient Location: [place] 
Current Patient Location: [place] 
Accession/Order Number: [id number] 
Exam Date: 10/03/2024  08:53    Report Date: 10/03/2024  13:08 
  
At the request of: 
JODI L SCHWAB   
  
Procedure:  US renal BI 
  
EXAMINATION: US renal BI  
  
HISTORY: Cyst Right Kidney , Kidney Stone Left 
  
COMPARISON: 11/22/2023 
  
TECHNIQUE: Ultrasound examination was performed of the bladder. 
  
FINDINGS: 
  
Right Kidney: Normal in size. The cortex measures 0.6 cm. Diffuse increase in  
cortical echotexture. Noted along the superior pole of the kidney is a 7.1 x  
6.8 x 6.3 cm septated cystic mass having both cystic and solid components,  
indeterminate. No hydronephrosis or obstructing nephrolithiasis 
Height: 5.41 cm Length: 8.20 cm Width: 5.82 cm 
  
Left Kidney: Normal in size, contour and echotexture. 7 mm echogenic focus  
lower pole, nonobstructing nephrolith. No hydronephrosis 
Height: 5.79 cm Length: 10.12 cm Width: 4.50 cm 
  
Urinary bladder volume: 201 mL 
  
The prostate gland is lobular in contour measuring 3.8 x 3.4 x 3.9 cm 
  
ORDER #: [id number] US/US renal BI  
IMPRESSION:   
   
7.1 cm mixed multilocular complex cystic lesion upper pole of the right   
kidney,   
indeterminate. CT scan or MRI without and with contrast is recommended for   
further evaluation  
   
   
Electronically authenticated by: DIANA CALDERON   Date: 10/03/2024  13:08

## 2024-10-29 ENCOUNTER — HOSPITAL ENCOUNTER
Dept: HOSPITAL 101 - LAB | Age: 69
Discharge: HOME | End: 2024-10-29
Payer: MEDICARE

## 2024-10-29 DIAGNOSIS — N28.89: Primary | ICD-10-CM

## 2024-10-29 LAB
ESTIMATED GFR (AFRICAN AMERICA: >60
ESTIMATED GFR (NON-AFRICAN AME: >60

## 2024-10-29 PROCEDURE — 74170 CT ABD WO CNTRST FLWD CNTRST: CPT

## 2024-10-29 PROCEDURE — 82565 ASSAY OF CREATININE: CPT

## 2024-10-29 PROCEDURE — 36415 COLL VENOUS BLD VENIPUNCTURE: CPT

## 2024-10-29 NOTE — XMS_ITS
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 Estrada.  
The patient and   
caregiver were given a   
customized and   
personalized print out   
of all the current   
AHRQ USPSTF?s   
recommendations for   
preventative services   
and all current CDC   
recommended   
immunizations,   
relevant risk   
recommendations and   
the following patient   
brochures were given.  
Reviewed Medicare   
Prevention Services   
checklist.  
CDC-Falls Prevention   
and home safety   
screening reviewed.   
Patient caregiver   
denies any patient   
falls in last 12   
months, voices no   
worry about patient   
falling. Patient   
exhibits no problems   
with sitting, standing   
or ambulation. Patient   
caregiver aware with   
keeping walk way area   
free of clutter to   
prevent tripping   
and/or falling.  
[place] Advance   
Directives reviewed.   
Caregiver Yesica   
states she is   
patient's legal   
guardian but patient   
does not have a living   
will filled out,   
patient does have a   
, educational   
handout given to   
Yseica Robert states   
she will contact   
patient  to   
discuss if she should   
fill out a living will   
for patient,   
encouraged to bring in   
for scanning into   
chart.  
Patient caregiver   
states patient needs   
assistance with ADL's   
and is dependent for   
Instrumental ADL's.  
Cognitive screening   
completed with memory   
and clock face   
drawing. Positive for   
cognitive impairment.   
MMSE Score: 9.  
Immunization record   
reviewed, discussed   
Shingrix and   
Pneumococcal vaccines   
with educational   
handout and   
availability. COVID   
vaccines have not been   
administered.  
Allergies and   
medications reviewed   
and up to date. No   
concerns with taking   
medication as   
prescribed. Reviewed   
OTC medications,   
medication list up to   
date.  
Blood tests were   
reviewed: Labs UTD.  
No concerns with   
bowel/ bladder. Per   
Caregiver patient has   
never had a   
colonoscopy but does   
have a family history   
of colon cancer.   
Referral placed by PCP   
to [facility] per Yesica   
request.  
Reviewed pain symptoms   
: no c/o pain, no   
signs of pain noted in   
patient facial   
expression or actions.  
Reviewed all outside   
providers that patient   
follows. Last visit   
summary notes   
available in chart   
and/or have been   
requested.  
Unable to screen for   
depression due to   
cognitive impairment,   
Caregiver Yesica   
states patient does   
not exhibit signs of   
being depressed, does   
engage per usual in   
activities that   
patient enjoys. 10   
minutes spent with   
screening discussion   
and documentation.  
Caregiver states   
patient does not drink   
alcohol, denies   
concerns. 8 minutes   
spent with screening   
and documentation.   
Audit score 0.  
Follow up scheduled   
with PCP, 24.  
AWV has been   
scheduled, 25.  
Medicare provides   
yearly screening for   
alcohol and depression   
concerns. This is   
completed during our   
Medicare wellness   
visit for those who do   
not have a current   
diagnosis of   
depression or concerns   
with alcohol use. I   
spent a total of 18   
minutes on this date   
of service which   
included preparing to   
see the patient, face   
to face patient care,   
completing clinical   
documentation,   
obtaining and/or   
reviewing separately   
obtained history,   
counseling and   
educating the patient   
and caregiver with   
handouts. Explanations   
were provided with   
reviewing   
questionnaires. AUDIT   
risk assessment   
screening completed,   
risk score (0) with   
patient caregiver   
denying concerns with   
use. Unable to   
complete PHQ-2 risk   
assessment for   
depression due to   
cognitive impairment.   
Patient caregiver to   
notify the provider if   
there would be a   
change or concerns   
with symptoms with   
fear, unable to sleep,   
worrying too much or   
feeling down and/or   
sad with lost of   
interest with daily   
activities. Will   
continue to monitor   
with screening yearly   
during Medicare   
wellness visits.  
2. Overweight (E66.3:   
Overweight)  
A combination of diet   
and exercise can help   
you lose weight.   
Discussed weight loss   
benefits to dietary   
management and overall   
health with increased   
cardiovascular risks   
associated with waist   
measurement female>35   
men>40. Reminded of   
importance to work on   
lowering current body   
weight with healthy   
dietary intake choices   
and portion control.   
Reviewed goals and   
patients readiness   
with needing to make a   
lifestyle change. Will   
work on increasing   
daily activity to   
prevent further weight   
gain. Will continue to   
monitor during office   
visits with progress.  
3. Head injury   
(S09.90XA: Unspecified   
injury of head,   
initial encounter)  
Patient taking Namenda   
and Aricept daily as   
prescribed. Caregiver   
states she does not   
notice much of a   
difference in patient   
memory or cognitive   
function on these   
medications. Patient   
to follow up with PCP.  
4. Encounter for   
abdominal aortic   
aneurysm (AAA)   
screening (Z13.6:   
Encounter for   
screening for   
cardiovascular   
disorders)  
Patient to h (more   
content not   
included)...        Normal                                  [facility]  
   
                                        Comment on above:   Result Comment: Elec  
tronically Signed By: Schwab FNP, Jodi L\.br\Date and Time Signed: 24 11:08 EDT\.br\Electronically   
Co-Signed By: Teresita Troy LPN\.br\Date and Time Co-Signed:   
24 12:17 EDT   
   
                                                    Ambulatory Visit Summaryon 0  
2024   
   
                                                    Ambulatory Visit   
Summary                                 [Image Removed]  
TAWANDA VILLARREAL  
:1955  
MRN:[id number]  
Visit Date:2024  
Ambulatory Visit   
Instructions  
Your Diagnosis  
Annual visit for   
general adult medical   
examination without   
abnormal findings  
Overweight  
Head injury  
Encounter for   
abdominal aortic   
aneurysm (AAA)   
screening  
Ex-light cigarette   
smoker (1-9 per day)  
Screening for colon   
cancer  
Screening for   
hepatitis C declined  
Tests Performed  
US Abdominal Aorta   
screening for AAA --   
Results Pending --  
Please visit your   
patient portal for   
your results or   
contact your primary   
care physician.  
Your Care Team  
Attending Physician -  
Schwab FNP, Jodi L  
Primary Care Physician   
-  
Schwab FNP, Jodi L  
This Is Your   
Medications List  
donepezil (donepezil   
10 mg Tab)  
memantine (memantine 5   
mg Tab)  
multivitamin  
Procedures Performed  
Shunt (),   
Cholecystectomy.  
Discharge Vitals  
Heart Rate   
(Peripheral)  
55  
Respiratory Rate  
18  
Blood Pressure  
122/82  
Height  
178 cm  
Height  
70 in  
Weight  
82 kg  
Weight  
180.4 lb  
BMI  
25.88  
What to do next  
Scheduled Follow-Up   
Appointments  
Monday Sep. 30, 2024   
11:20 AM EDT  
With: Schwab FNP, Jodi L  
Where: [facility]   
[facility] [place]         Normal                    [address] [id number]- [phone]\.br\   
Someone Will   
Contact You   
Regarding These   
Appointments\.br\   
[facility]   
Ambulatory   
Referral, Other   
(needs to be   
filled in),   
Gastroenterology,   
Colon Cancer   
Screening, family   
history colon   
cancer,   
Requesting [facility]   
[facility],   
24 11:35:00   
EDT, Screening   
for colon   
cancer\.br\   
Medications\.br\   
What How Much   
When Why   
Instructions\.br\   
Unchanged   
donepezil   
(donepezil 10 mg   
Tab) 1 Tablets By   
Mouth Once a day   
(at bedtime)   
Wellness   
examination   
Screening for   
hyperlipidemia   
Screening for   
prostate cancer   
Fatigue Nocturia   
Allergies Brain   
damage Head   
injury BMI   
26.0-26.9,adult   
Former   
smoker\.br\   
Unchanged   
memantine   
(memantine 5 mg   
Tab) 1 Tablets By   
Mouth Every day   
Wellness   
examination   
Screening for   
hyperlipidemia   
Screening for   
prostate cancer   
Fatigue Nocturia   
Allergies Brain   
damage Head   
injury BMI   
26.0-26.9,adult   
Former   
smoker\.br\   
Unchanged   
multivitamin   
Men's Over 50   
Multi Vitamin   
\.br\   
Allergies\.br\ No   
Known   
Allergies\.br\ No   
Known Medication   
Allergies\.br\   
Problems\.br\   
Ongoing - Any   
problem that you   
are currently   
receiving   
treatment   
for.\.br\   
Allergies\.br\   
BMI   
25.0-25.9,adult\.  
br\ Change in   
skin mole\.br\   
Elevated PSA\.br\   
Fatigue\.br\ Head   
injury\.br\   
Jaundice\.br\   
Nocturia\.br\   
Right kidney   
mass\.br\   
Screening for   
hyperlipidemia\.b  
r\ Screening for   
prostate   
cancer\.br\ Skin   
texture   
changes\.br\   
Sleep   
disorder\.br\   
Wellness   
examination\.br\   
Patient   
Survey\.br\ You   
may receive a   
survey via text   
or e-mail asking   
about your office   
visit. Please   
share your   
experience with   
us by completing   
your survey. We   
appreciate your   
feedback and   
thank you for   
choosing us for   
your care.\.br\   
Education   
Materials\.br\   
Healthy   
Eating\.br\   
Following a   
healthy eating   
pattern may help   
you to achieve   
and maintain a   
healthy body   
weight, reduce   
the risk of   
chronic disease,   
and live a long   
and productive   
life. It is   
important to   
follow a healthy   
eating pattern at   
an appropriate   
calorie level for   
your body. Your   
nutritional needs   
should be met   
primarily through   
food by choosing   
a variety of   
nutrient-rich   
foods.\.br\ What   
are tips for   
following this   
plan?\.br\   
Reading food   
labels\.br\ ?   
\.br\ Read labels   
and choose the   
following:\.br\ ?   
\.br\ Reduced or   
low sodium.\.br\   
? \.br\ Juices   
with 100% fruit   
juice.\.br\ ?   
\.br\ Foods with   
low saturated   
fats and high   
polyunsaturated   
and   
monounsaturated   
fats.\.br\ ?   
\.br\ Foods with   
whole grains,   
such as whole   
wheat, cracked   
wheat, brown   
rice, and wild   
rice.\.br\ ?   
\.br\ Whole   
grains that are   
fortified with   
folic acid. This   
is recommended   
for women who are   
pregnant or who   
want to become   
pregnant.\.br\ ?   
\.br\ Read labels   
and avoid the   
following:\.br\ ?   
\.br\ Foods with   
a lot of added   
sugars. These   
include foods   
that contain   
brown sugar, corn   
sweetener, corn   
syrup, dextrose,   
fructose,   
glucose,   
high-fructose   
corn syrup,   
honey, invert   
sugar, lactose,   
malt syrup,   
maltose,   
molasses, raw   
sugar, sucrose,   
trehalose, or   
turbinado   
sugar.\.br\ ?   
\.br\ Do not eat   
more than the   
following amounts   
of added sugar   
per day:\.br\ ?   
\.br\ 6 teaspoons   
(25 g) for   
women.\.br\ ?   
\.br\ 9 teaspoons   
(38 g) for   
men.\.br\ ? \.br\   
Foods that   
contain processed   
or refined   
starches and   
grains.\.br\ ?   
\.br\ Refined   
grain products,   
such as white   
flour, degermed   
cornmeal, white   
bread, and white   
rice.\.br\   
Shopping\.br\ ?   
\.br\ Choose   
nutrient-rich   
snacks, such as   
vegetables, whole   
fruits, and nuts.   
Avoid   
high-calorie and   
high-sugar   
snacks, such as   
potato chips,   
fruit snacks, and   
candy.\.br\ ?   
\.br\ Use   
oil-based   
dressings and   
spreads on foods   
instead of solid   
fats such as   
butter, stick   
margarine, or   
cream   
cheese.\.br\ ?   
\.br\ Limit   
pre-made sauces,   
mixes, and   
 instant    
products such as   
flavored rice,   
instant noodles,   
and ready-made   
pasta.\.br\ ?   
\.br\ Try more   
plant-protein   
sources, such as   
tofu, tempeh,   
black beans,   
edamame, lentils,   
nuts, and   
seeds.\.br\ ?   
\.br\ Explore   
eating plans such   
as the   
Mediterranean   
diet or   
vegetarian   
diet.\.br\   
Cooking\.br\ ?   
\.br\ Use oil to   
saut? or stir-kenney   
foods instead of   
solid fats such   
as butter, stick   
margarine, or   
lard.\.br\ ?   
\.br\ Try baking,   
boiling,   
grilling, or   
broiling instead   
of frying.\.br\ ?   
\.br\ Remove the   
fatty part of   
meats before   
cooking.\.br\ ?   
\.br\ Steam   
vegetables in   
water or   
broth.\.br\ Meal   
planning\.br\   
[Image   
Removed]\.br\ ?   
\.br\ At meals,   
imagine dividing   
your plate into   
fourths:\.br\ ?   
\.br\ One-half of   
your plate is   
fruits and   
vegetables.\.br\   
? \.br\   
One-fourth of   
your plate is   
whole   
grains.\.br\ ?   
\.br\ One-fourth   
of your plate is   
protein,   
especially lean   
meats, poultry,   
eggs, tofu,   
beans, or   
nuts.\.br\ ?   
\.br\ Include   
low-fat dairy as   
part of your   
daily diet.\.br\   
Lifestyle\.br\ ?   
\.br\ Choose   
healthy options   
in all settings,   
including home,   
work, school,   
restaurants, or   
stores.\.br\ ?   
\.br\ Prepare   
your food   
safely:\.br\ ?   
\.br\ Wash your   
hands after   
handling raw   
meats.\.br\ ?   
\.br\ Keep food   
preparation   
surfaces clean by   
regularly washing   
with hot, soapy   
water.\.br\ ?   
\.br\ Keep raw   
meats separate   
from ready-to-eat   
foods, such as   
fruits and   
vegetables.\.br\   
? \.br\ Cook   
seafood, meat,   
poultry, and eggs   
to the   
recommended   
internal   
temperature.\.br\   
? \.br\ Store   
foods at safe   
temperatures. In   
general:\.br\ ?   
\.br\ Keep cold   
foods at 40?F   
(4.4?C) or   
below.\.br\ ?   
\.br\ Keep hot   
foods at 140?F   
(60?C) or   
above.\.br\ ?   
\.br\ Keep your   
freezer at 0?F   
(-17.8?C) or   
below.\.br\ ?   
\.br\ Foods are   
no longer safe to   
eat when they   
have been between   
the temperatures   
of 40??140?F   
(4.4?60?C) for   
more than 2   
hours.\.br\ What   
foods should I   
eat?\.br\   
Fruits\.br\ Aim   
to eat 2   
cup-equivalents   
of fresh, canned   
(in natural   
juice), or frozen   
fruits each day.   
Examples of 1   
cup-equivalent of   
fruit include 1   
small apple, 8   
large   
strawberries, 1   
cup canned fruit,   
? cup dried   
fruit, or 1 cup   
100% juice.\.br\   
Vegetables\.br\   
Aim to eat 2??3   
cup-equivalents   
of fresh and   
frozen vegetables   
each day,   
including   
different   
varieties and   
colors. Examples   
of 1   
cup-equivalent of   
vegetables   
include 2 medium   
carrots, 2 cups   
raw, leafy   
greens, 1 cup   
chopped vegetable   
(raw or cooked),   
or 1 medium baked   
potato.\.br\   
Grains\.br\ Aim   
to eat 6   
ounce-equivalents   
of whole grains   
each day.   
Examples of 1   
ounce-equivalent   
of grains include   
1 slice of bread,   
1 cup   
ready-to-eat   
cereal, 3 cups   
popcorn, or ? cup   
cooked rice,   
pasta, or   
cereal.\.br\   
Meats and other   
proteins\.br\ Aim   
to eat 5?6   
ounce-equivalents   
of protein each   
day. Examples of   
1   
ounce-equivalent   
of protein   
include 1 egg,   
1/2 cup nuts or   
seeds, or 1   
tablespoon (16 g)   
peanut butter. A   
cut of meat or   
fish that is the   
size of a deck of   
cards is about   
3?4   
ounce-equivalents  
.\.br\ ? \.br\ Of   
the protein you   
eat each week,   
try to have at   
least 8 ounces   
come from   
seafood. This   
includes salmon,   
trout, herring,   
and   
anchovies.\.br\   
Dairy\.br\ Aim to   
eat 3   
cup-equivalents   
of fat-free or   
low-fat dairy   
each day.   
Examples of 1   
cup-equivalent of   
dairy include 1   
cup (240 mL)   
milk, 8 ounces   
(250 g) yogurt,   
1? ounces (44 g)   
natural cheese,   
or 1 cup (240 mL)   
fortified soy   
milk.\.br\ Fats   
and oils\.br\ ?   
\.br\ Aim for   
about 5 teaspoons   
(21 g) per day.   
Choose   
monounsaturated   
fats, such as   
canola and olive   
oils, avocados,   
peanut butter,   
and most nuts, or   
polyunsaturated   
fats, such as   
sunflower, corn,   
and soybean oils,   
walnuts, pine   
nuts, sesame   
seeds, sunflower   
seeds, and   
flaxseed                                [facility]  
   
                                                    Ambulatory Visit   
Summary                                 [Image Removed]  
TAWANDA VILLARREAL  
:1955  
MRN:[id number]  
Visit Date:2024  
Ambulatory Visit   
Instructions  
Your Care Team  
Attending Physician -  
Schwab FNP, Jodi L  
Primary Care Physician   
-  
Schwab FNP, Jodi L  
This Is Your   
Medications List  
donepezil (donepezil   
10 mg Tab)  
memantine (memantine 5   
mg Tab)  
multivitamin  
Procedures Performed  
Shunt (),   
Cholecystectomy.  
Discharge Vitals  
Heart Rate   
(Peripheral)  
55  
Respiratory Rate  
18  
Blood Pressure  
122/82  
Height  
178 cm  
Height  
70 in  
Weight  
82 kg  
Weight  
180.4 lb  
BMI  
25.88  
What to do next  
Scheduled Follow-Up   
Appointments  
Monday Sep. 30, 2024   
11:20 AM EDT  
With: Schwab FNP, Jodi L  
Where: [facility]   
[facility] [place]         Normal                    [address] [place]   
[id number]- [phone]\.br\   
Medications\.br\   
What How Much   
When Why   
Instructions\.br\   
Unchanged   
donepezil   
(donepezil 10 mg   
Tab) 1 Tablets By   
Mouth Once a day   
(at bedtime)   
Wellness   
examination   
Screening for   
hyperlipidemia   
Screening for   
prostate cancer   
Fatigue Nocturia   
Allergies Brain   
damage Head   
injury BMI   
26.0-26.9,adult   
Former   
smoker\.br\   
Unchanged   
memantine   
(memantine 5 mg   
Tab) 1 Tablets By   
Mouth Every day   
Wellness   
examination   
Screening for   
hyperlipidemia   
Screening for   
prostate cancer   
Fatigue Nocturia   
Allergies Brain   
damage Head   
injury BMI   
26.0-26.9,adult   
Former   
smoker\.br\   
Unchanged   
multivitamin   
Men's Over 50   
Multi Vitamin   
\.br\   
Allergies\.br\ No   
Known   
Allergies\.br\ No   
Known Medication   
Allergies\.br\   
Problems\.br\   
Ongoing - Any   
problem that you   
are currently   
receiving   
treatment   
for.\.br\   
Allergies\.br\   
Change in skin   
mole\.br\   
Elevated PSA\.br\   
Fatigue\.br\ Head   
injury\.br\   
Jaundice\.br\   
Nocturia\.br\   
Right kidney   
mass\.br\   
Screening for   
hyperlipidemia\.b  
r\ Screening for   
prostate   
cancer\.br\ Skin   
texture   
changes\.br\   
Sleep   
disorder\.br\   
Wellness   
examination\.br\   
Patient   
Survey\.br\ You   
may receive a   
survey via text   
or e-mail asking   
about your office   
visit. Please   
share your   
experience with   
us by completing   
your survey. We   
appreciate your   
feedback and   
thank you for   
choosing us for   
your care.\.br\   
[Image   
Removed]\.br\                           [facility]  
   
                                                    Patient Educationon 20  
24   
   
                                        Patient Education   Caregiving  
Advance Directive  
(Inserted Image.   
Unable to display)  
Advance directives are   
legal documents that   
allow you to make   
decisions about your   
health care and   
medical treatment in   
case you become unable   
to communicate for   
yourself. Advance   
directives let your   
wishes be known to   
family, friends, and   
health care providers.  
Discussing and writing   
advance directives   
should happen over   
time rather than all   
at once. Advance   
directives can be   
changed and updated at   
any time. There are   
different types of   
advance directives,   
such as:  
? Medical power of   
.  
? Living will.  
? Do not resuscitate   
(DNR) order or do not   
attempt resuscitation   
(DNAR) order.  
Health care proxy and   
medical power of   
  
A health care proxy is   
also called a health   
care agent. This   
person is appointed to   
make medical decisions   
for you when you are   
unable to make   
decisions for   
yourself. Generally,   
people ask a trusted   
friend or family   
member to act as their   
proxy and represent   
their preferences.   
Make sure you have an   
agreement with your   
trusted person to act   
as your proxy. A proxy   
may have to make a   
medical decision on   
your behalf if your   
wishes are not known.  
A medical power of   
, also called   
a durable power of   
 for health   
care, is a legal   
document that names   
your health care   
proxy. Depending on   
the laws in your   
state, the document   
may need to be:  
? Signed.  
? Notarized.  
? Dated.  
? Copied.  
? Witnessed.  
? Incorporated into   
your medical record.  
You may also want to   
appoint a trusted   
person to manage your   
money in the event you   
are unable to do so.   
This is called a   
durable power of   
 for finances.   
It is a separate legal   
document from the   
durable power of   
 for health   
care. You may choose   
your health care proxy   
or someone different   
to act as your agent   
in money matters.  
If you do not appoint   
a proxy, or there is a   
concern that the proxy   
is not acting in your   
best interest, a court   
may appoint a guardian   
to act on your behalf.  
Living will  
A living will is a set   
of instructions that   
state your wishes   
about medical care   
when you cannot   
express them yourself.   
Health care providers   
should keep a copy of   
your living will in   
your medical record.   
You may want to give a   
copy to family members   
or friends. To alert   
caregivers in case of   
an emergency, you can   
place a card in your   
wallet to let them   
know that you have a   
living will and where   
they can find it. A   
living will is used if   
you become:  
? Terminally ill.  
? Disabled.  
? Unable to   
communicate or make   
decisions.  
The following   
decisions should be   
included in your   
living will:  
? To use or not to use   
life support   
equipment, such as   
dialysis machines and   
breathing machines   
(ventilators).  
? Whether you want a   
DNR or DNAR order.   
This tells health care   
providers not to use   
cardiopulmonary   
resuscitation (CPR) if   
breathing or heartbeat   
stops.  
? To use or not to use   
tube feeding.  
? To be given or not   
to be given food and   
fluids.  
? Whether you want   
comfort (palliative)   
care when the goal   
becomes comfort rather   
than a cure.  
? Whether you want to   
donate your organs and   
tissues.  
A living will does not   
give instructions for   
distributing your   
money and property if   
you should pass away.  
DNR or DNAR  
A DNR or DNAR order is   
a request not to have   
CPR in the event that   
your heart stops   
beating or you stop   
breathing. If a DNR or   
DNAR order has not   
been made and shared,   
a health care provider   
will try to help any   
patient whose heart   
has stopped or who has   
stopped breathing. If   
you plan to have   
surgery, talk with   
your health care   
provider about how   
your DNR or DNAR order   
will be followed if   
problems occur.  
What if I do not have   
an advance directive?  
Some states assign   
family decision makers   
to act on your behalf   
if you do not have an   
advance directive.   
Each state has its own   
laws about advance   
directives. You may   
want to check with   
your health care   
provider, , or   
state representative   
about the laws in your   
state.  
Summary  
? Advance directives   
are legal documents   
that allow you to make   
decisions about your   
health care and   
medical treatment in   
case you become unable   
to communicate for   
yourself.  
? The process of   
discussing and writing   
advance directives   
should happen over   
time. You can change   
and update advance   
directives at any   
time.  
? Advance directives   
may include a medical   
power of , a   
living will, and a DNR   
or DNAR order.  
This information is   
not intended to   
replace advice given   
to you by your health   
care provider. Make   
sure you discuss any   
questions you have   
with your health care   
provider.  
Document Revised:   
2021 Document   
Reviewed: 2021  
iPosition Patient   
Education ?    
[employer]   
Infectious Disease  
Pneumococcal Conjugate   
Vaccine: What You Need   
to Know  
1. Why get vaccinated?  
Pneumococcal conjugate   
vaccine can prevent   
pneumococcal disease.  
Pneumococcal disease   
refers to any illness   
caused by pneumococcal   
bacteria. These   
bacteria can cause   
many types of   
illnesses, includi   
(more content not   
included)...        Normal                                  [facility]  
   
                                                    Screenson 2024   
   
                                        Screens             [id number]  
40  
[id number]#1.0  
0TIFF               [facility]  
   
                                                    RAD - Ultrasound Reporton    
   
                                                    RAD - Ultrasound   
Report                                  [id number]  
[id number]#1.0  
0TIFF               [facility]  
   
                                                    Physician Orderon 2024  
   
   
                                        Physician Order     [id number]  
30  
[id number]#1.0  
0TIFF               [facility]  
   
                                                    Physician Referralon   
024   
   
                                        Physician Referral  [id number]  
30  
[id number]#1  
.00TIFF             [facility]  
   
                                                    Reminderson 2024   
   
                                        Reminders           --------------------  
-  
From: Schwab FNP, Jodi L  
To: B - Clinical;  
Sent: 3/20/2024   
08:41:38 EDT Show up:   
3/20/2024 08:42:00 EDT  
Subject: Ambulatory   
Reminder  
Due Date/Time:   
3/21/2024 08:41:00 EDT  
psa was normal  
Results:  
Date Result Name Value   
Ref Range  
3/19/2024 14:00 PSA   
Total 3.1 ng/mL (0.1 -   
3.5)  
Patient's guardian   
informed and voiced   
understanding.      Normal                                  [facility]  
   
                                                    Ambulatory Visit Summaryon 0  
3-   
   
                                                    Ambulatory Visit   
Summary                                 [Image Removed]  
TAWANDA VILLARREAL  
:1955  
MRN:[id number]  
Visit Date:2024  
Ambulatory Visit   
Instructions  
Your Diagnosis  
Elevated PSA  
BMI 26.0-26.9,adult  
Non-smoker  
Your Care Team  
Attending Physician -  
Schwab FNP, Jodi L  
Primary Care Physician   
-  
Schwab FNP, Jodi L  
This Is Your   
Medications List  
cetirizine (cetirizine   
10 mg oral capsule)  
donepezil (donepezil   
10 mg Tab)  
memantine (memantine 5   
mg Tab)  
Procedures Performed  
Shunt (),   
Cholecystectomy.  
Discharge Vitals  
Heart Rate   
(Peripheral)  
88  
Respiratory Rate  
18  
Blood Pressure  
118/84  
Height  
177 cm  
Height  
70 in  
Weight  
81.7 kg  
Weight  
179.74 lb  
BMI  
26.08  
What to do next  
Scheduled Follow-Up   
Appointments  
   
1:00 PM EDT  
With:  
Where: [facility]         Normal                    [address] [place] [address]   
[phone]- [phone]\.br\   
Medications\.br\   
What How Much   
When Why   
Instructions\.br\   
Unchanged   
cetirizine   
(cetirizine 10 mg   
oral capsule) 1   
Capsules By Mouth   
Every day as   
needed for for   
allergy symptoms   
Wellness   
examination   
Screening for   
hyperlipidemia   
Screening for   
prostate cancer   
Fatigue Nocturia   
Allergies Brain   
damage Head   
injury BMI   
26.0-26.9,adult   
Former   
smoker\.br\   
Unchanged   
donepezil   
(donepezil 10 mg   
Tab) 1 Tablets By   
Mouth Once a day   
(at bedtime)   
Wellness   
examination   
Screening for   
hyperlipidemia   
Screening for   
prostate cancer   
Fatigue Nocturia   
Allergies Brain   
damage Head   
injury BMI   
26.0-26.9,adult   
Former   
smoker\.br\   
Unchanged   
memantine   
(memantine 5 mg   
Tab) 1 Tablets By   
Mouth Every day   
Wellness   
examination   
Screening for   
hyperlipidemia   
Screening for   
prostate cancer   
Fatigue Nocturia   
Allergies Brain   
damage Head   
injury BMI   
26.0-26.9,adult   
Former   
smoker\.br\   
Allergies\.br\ No   
Known Medication   
Allergies\.br\   
Problems\.br\   
Ongoing - Any   
problem that you   
are currently   
receiving   
treatment   
for.\.br\   
Allergies\.br\   
Elevated PSA\.br\   
Fatigue\.br\ Head   
injury\.br\   
Jaundice\.br\   
Nocturia\.br\   
Screening for   
hyperlipidemia\.b  
r\ Screening for   
prostate   
cancer\.br\ Sleep   
disorder\.br\   
Wellness   
examination\.br\   
Patient   
Survey\.br\ You   
may receive a   
survey via text   
or e-mail asking   
about your office   
visit. Please   
share your   
experience with   
us by completing   
your survey. We   
appreciate your   
feedback and   
thank you for   
choosing us for   
your care.\.br\   
[Image   
Removed]\.br\                           [facility]   
[facility]  
   
                                                    Family Medicine Office/Clini  
c Noteon 2024   
   
                                                    Family Medicine   
Office/Clinic Note                      HPI Staff  
Tawanda is a 68 year   
old male presenting   
for follow up  
Would like re draw on   
PSA, last PSA PSA Scrn   
Tot.: 4.1 ng/mL High   
(10/27/23 09:13:00)  
Would a few moles   
looked at one of left   
side of face and a few   
one the back.  
History of Present   
Illness  
pt presents today for   
repeat PSA and has a   
couple moles he wants   
looked at  
Review of Systems  
PHQ Score  
Initial Depression   
Screen Score: 0 SCORE  
Physical Exam  
Vitals & Measurements  
HR: 88(Peripheral) RR:   
18 BP: 118/84 SpO2:   
97%  
HT: 70 in HT: 177 cm   
WT: 81.7 kg WT: 179.74   
lb BMI: 26.08  
General: alert, no   
acute distress  
ENMT: oral mucosa   
moist, no pharyngeal   
erythema or exudate  
Cardiovascular:   
regular rate and   
rhythm, normal   
peripheral perfusion  
Respiratory: Lungs   
CTA, respirations non   
labored  
Extremities: no   
deformity, no trauma  
Neurological: oriented   
x 4, LOC appropriate   
for age, CN II-XII   
intact, motor strength   
equal & normal   
bilaterally, speech   
normal  
mole on left temple   
are and on upper mid   
back dark, raised and   
change in texture  
Assessment/Plan  
1. Change in skin mole   
(D22.9: Melanocytic   
nevi, unspecified)  
one mole on left   
temple area and one on   
upper mid back has   
changed in size, color   
and texture. will   
refer to dermatology   
partners per pt   
request.  
  
2. Skin texture   
changes (R23.4:   
Changes in skin   
texture)  
see above  
  
3. Right kidney mass   
(N28.89: Other   
specified disorders of   
kidney and ureter)  
when patient was   
shipped to [place]. a   
mass was found on   
right kidney. they did   
not have any follow up   
to monitor this mass.   
caregiver says he had   
several appointments   
but she is not   
dragging him to [place]   
for things we can do   
locally. kidney u/s   
ordered to be done at   
[facility]  
  
4. Elevated PSA   
(R97.20: Elevated   
prostate specific   
antigen [PSA])  
psa drawn in office   
today  
Ordered:  
Lab Specimen Collect   
[address]  
PSA Total  
  
5. BMI 26.0-26.9,adult   
(Z68.26: Body mass   
index [BMI] 26.0-26.9,   
adult)  
BMI education complete  
  
6. Non-smoker (Z78.9:   
Other specified health   
status)  
continue not smoking  
  
Follow-up  
No qualifying data   
available  
Problem List/Past   
Medical History  
Ongoing  
Allergies  
Change in skin mole  
Elevated PSA  
Fatigue  
Head injury  
Jaundice  
Nocturia  
Right kidney mass  
Screening for   
hyperlipidemia  
Screening for prostate   
cancer  
Skin texture changes  
Sleep disorder  
Wellness examination  
Historical  
No qualifying data  
Procedure/Surgical   
History  
Shunt (),   
Cholecystectomy.  
Medications  
cetirizine 10 mg oral   
capsule, 10 mg= 1   
cap(s), Oral, Daily,   
PRN, 2 refills  
donepezil 10 mg Tab,   
10 mg= 1 tab(s), Oral,   
Once a day (at   
bedtime), 2 refills  
memantine 5 mg Tab, 5   
mg= 1 tab(s), Oral,   
Daily, 2 refills  
Allergies  
No Known Medication   
Allergies  
Social History  
Tobacco  
Former smoker, quit   
more than 30 days ago   
Tobacco Use:.   
Cigarettes, Household   
tobacco concerns: No.,   
2024  
Immunizations  
Vaccine Date Status  
diphtheria/pertussis,   
acel/tetanus adult   
2020 Recorded  
influenza virus   
vaccine, inactivated   
2017 Recorded  
influenza virus   
vaccine, inactivated   
10/23/2015 Recorded Normal                                  [facility]   
[facility]  
   
                                        Comment on above:   Result Comment: Elec  
tronically Signed By: Schwab FNP, Autumn GOLD\.br\Date and Time Signed: 24 14:18 EDT   
   
                                                    PSA TotalOrdered By: SYSTEM   
SYSTEM on 2024   
   
                                                    Prostate specific   
Ag [Mass/Vol]   3.1 ng/mL       Normal          0.1-3.5         Remisol   
Chem  
   
                                        Comment on above:   Interpretive Data: T  
he concentration of PSA determined by   
different manufacturers can vary due to differences in assay   
methods and reagent specificity. Values obtained from different   
assay methods cannot be used interchangeably. The methodology used   
for this result was chemiluminescence using [employer]'s   
Access Hybritech PSA reagent.   
   
                                                            Result Comment: The   
concentration of PSA determined by different   
manufacturers can vary due to differences in assay methods and   
reagent specificity. Values obtained from different assay methods   
cannot be used interchangeably. The methodology used for this   
result was chemiluminescence using [employer]s Access   
Hybritech PSA reagent.   
   
                                                            Performed By: #### 1  
[id number] ####[facility] [place] [address]   
   
                                                    XR SHUNT SERIES PLACEMENT (<  
4 VIEWS)on 2023   
   
                                                    XR SHUNT SERIES   
PLACEMENT (<4   
VIEWS)                                  EXAMINATION:  
SHUNT SERIES  
2023 11:02 am  
COMPARISON:  
None.  
HISTORY:  
ORDERING SYSTEM   
PROVIDED HISTORY:   
Evaluate shunt after   
lap sukumar  
TECHNOLOGIST PROVIDED   
HISTORY:  
Evaluate shunt after   
lap skuumar  
Reason for Exam: eval   
shunt post lap sukumar  
FINDINGS:  
Right occipital shunt   
catheter is present   
extending from the   
right neck into  
the right chest wall   
and abdomen   
terminating in the   
left lower quadrant of  
the pelvis.  
No evidence of bowel   
obstruction. No   
evidence of shunt   
discontinuity or  
focal kink.  
IMPRESSION:  
Shunt catheter as   
described terminating   
in the left lower   
quadrant of the  
pelvis.  
Interpreted by:  
Adarsh Kirkland MD  
Signed by:  
Adarsh Kirkland MD  
12/3/23  
Final result        Normal                                  [facility]  
   
                                                    Cult,Bloodon 2023   
   
                                        Cult,Blood          Specimen Description  
   
.BLOOD  
Special Requests  
Culture NO GROWTH 5   
DAYS  
Report Status FINAL   
2023          Normal                                  [facility]  
   
                                        Comment on above:   Performed By: #### C  
MPX ####  
[facility]  
[facility] [place], [place] [address] [phone]  
: John Ahumada MD   
   
                                                            Performed By: #### B  
C ####  
[facility]  
[address] [place], [place] [address] [phone]  
: John Ahumada MD   
   
                                        Cult,Blood          Specimen Description  
   
.BLOOD  
Special Requests  
Culture NO GROWTH 5   
DAYS  
Report Status FINAL   
2023          Normal                                  [facility]  
   
                                        Comment on above:   Performed By: #### B  
C ####  
[address] [address]  
[phone]  
: John Ahumada MD   
   
                                                    Basic Metab w/rfx MGon    
   
                                                    Creatinine   
[Mass/Vol]      0.7 mg/dL       Normal          0.7-1.2         [facility]  
   
                                        Comment on above:   Result Comment: ICTE  
[place] SPEC[facility]EN   
   
                                                            Performed By: #### C  
MPX ####  
[address] [address]  
[phone]  
: John Ahumada MD   
   
                                                            Performed By: #### B  
C ####  
[address] [address]  
[phone]  
: John Ahumada MD   
   
                                                    GFR/1.73 sq   
M.predicted among   
non-blacks MDRD   
(S/P/Bld) [Vol   
rate/Area]      mL/min/{1.73_m2} Normal          >60             [facility]  
   
                                        Comment on above:   Result Comment:  
These results are not intended for use in patients <18 years of   
age.  
eGFR results are calculated without a race factor using the    
CKD-EPI  
equation.  
Careful clinical correlation is recommended, particularly when   
comparing to  
results calculated using previous equations.  
The CKD-EPI equation is less accurate in patients with extremes of   
muscle mass,  
extra-renal metabolism of creatine, excessive creatine ingestion,   
or following  
therapy that affects renal tubular secretion.   
   
                                                            Performed By: #### C  
MPX ####  
[address] [address]  
[phone]  
: John Ahumada MD   
   
                                                            Performed By: #### B  
C ####  
[facility]  
[address] [place] [address]  
[phone]  
: Jhon Ahumada MD   
   
                                                    Anion gap   
[Moles/Vol]     8 mmol/L        Low             9-17            [facility]  
   
                                        Comment on above:   Performed By: #### C  
MPX ####  
[address] [address]  
[phone]  
: John Ahumada MD   
   
                                                            Performed By: #### B  
C ####  
[address] [address]  
[phone]  
: John Ahumada MD   
   
                      Calcium [Mass/Vol] 9.0 mg/dL  Normal     8.6-10.4   [facility]  
   
                                        Comment on above:   Performed By: #### C  
MPX ####  
[address] [address]  
[phone]  
: John Ahumada MD   
   
                                                            Performed By: #### B  
C ####  
[address] [address]  
[phone]  
: John Ahumada MD   
   
                                                    Chloride   
[Moles/Vol]     103 mmol/L      Normal                    [facility]  
   
                                        Comment on above:   Performed By: #### C  
MPX ####  
[address] [address]  
[phone]  
: John Ahumada MD   
   
                                                            Performed By: #### B  
C ####  
[address] [address]  
[phone]  
: John Ahumada MD   
   
                      CO2 [Moles/Vol] 24 mmol/L  Normal     20-31      [facility]  
   
                                        Comment on above:   Performed By: #### C  
MPX ####  
[address] [address]  
[phone]  
: John Ahumada MD   
   
                                                            Performed By: #### B  
C ####  
[address] [address]  
[phone]  
: John Ahumada MD   
   
                      Glucose [Mass/Vol] 111 mg/dL  High       70-99      [facility]  
   
                                        Comment on above:   Performed By: #### C  
MPX ####  
[address] [address]  
[phone]  
: John Ahumada MD   
   
                                                            Performed By: #### B  
C ####  
[address] [address]  
[phone]  
: John Ahumada MD   
   
                                                    Potassium   
[Moles/Vol]     4.5 mmol/L      Normal          3.7-5.3         [facility]  
   
                                        Comment on above:   Performed By: #### C  
MPX ####  
[address] [address]  
[phone]  
: John Ahumada MD   
   
                                                            Performed By: #### B  
C ####  
[address] [address]  
[phone]  
: John Ahumada MD   
   
                      Sodium [Moles/Vol] 135 mmol/L Normal     135-144    [facility]  
   
                                        Comment on above:   Performed By: #### C  
MPX ####  
[address] [address]  
[phone]  
: John Ahumada MD   
   
                                                            Performed By: #### B  
C ####  
[address] [address]  
[phone]  
: John Ahumada MD   
   
                                                    Urea nitrogen   
[Mass/Vol]      14 mg/dL        Normal          8-23            [facility]  
   
                                        Comment on above:   Performed By: #### C  
MPX ####  
[address] [address]  
[phone]  
: John Ahumada MD   
   
                                                            Performed By: #### B  
C ####  
[address] [address]  
[phone]  
: John Ahumada MD   
   
                                                    CBC with Diffon 2023   
   
                      Abs. Basophil 0.05 k/uL  Normal     0.00-0.20  [facility]  
   
                                        Comment on above:   Performed By: #### C  
DP, REJEC ####  
[address] [address]  
[phone]  
: John Ahumada MD   
   
                      Abs. Eosinophil <0.03      Normal     0.00-0.44  [facility]  
   
                                        Comment on above:   Performed By: #### C  
DP, REJEC ####  
[address] [address]  
[phone]  
: John Ahumada MD   
   
                      Abs.Imm.Granulocyte 0.10 k/uL  Normal     0.00-0.30  [facility]  
   
                                        Comment on above:   Performed By: #### C  
DP, REJEC ####  
[address] [address]  
[phone]  
: John Ahumada MD   
   
                                                    Abs.Neutrophil   
(Seg)           9.29 k/uL       High            1.50-8.10       [facility]  
   
                                        Comment on above:   Performed By: #### C  
DP, REJEC ####  
[address] [address]  
[phone]  
: John Ahumada MD   
   
                                                    Basophils/100 WBC   
(Bld)           0 %             Normal          0-2             [facility]  
   
                                        Comment on above:   Performed By: #### C  
DP, REJEC ####  
[address] [address]  
[phone]  
: John Ahumada MD   
   
                                                    Eosinophils/100 WBC   
(Bld)           0 %             Low             1-4             [facility]  
   
                                        Comment on above:   Performed By: #### C  
DP, REJEC ####  
[address] [address]  
[phone]  
: John Ahumada MD   
   
                                                    Erythrocyte   
distribution width   
(RBC) [Ratio]   15.1 %          High            11.8-14.4       [facility]  
   
                                        Comment on above:   Performed By: #### C  
DP, REJEC ####  
[address] [address]  
[phone]  
: John Ahumada MD   
   
                                                    Hematocrit (Bld)   
[Volume fraction] 48.7 %          Normal          40.7-50.3       [facility]  
   
                                        Comment on above:   Performed By: #### C  
DP, REJEC ####  
[address] [address]  
[phone]  
: John Ahumada MD   
   
                                                    Hemoglobin (Bld)   
[Mass/Vol]      15.6 g/dL       Normal          13.0-17.0       [facility]  
   
                                        Comment on above:   Performed By: #### C  
DP, REJEC ####  
[address] [address]  
[phone]  
: John Ahumada MD   
   
                                                    Immature   
granulocytes/100   
WBC (Bld)       1 %             High            0               [facility]  
   
                                        Comment on above:   Performed By: #### C  
DP, REJEC ####  
[address] [address]  
[phone]  
: John Ahumada MD   
   
                                                    Lymphocytes (Bld)   
[#/Vol]         1.27 10*3/uL    Normal          1.10-3.70       [facility]  
   
                                        Comment on above:   Performed By: #### C  
DP, REJEC ####  
[address] [address]  
[phone]  
: John Ahumada MD   
   
                                                    Lymphocytes/100 WBC   
(Bld)           11 %            Low             24-43           [facility]  
   
                                        Comment on above:   Performed By: #### C  
DP, REJEC ####  
[place] [address]  
[phone]  
: John Ahumada MD   
   
                                                    MCH (RBC) [Entitic   
mass]           30.3 pg         Normal          25.2-33.5       [facility]  
   
                                        Comment on above:   Performed By: #### C  
DP, REJEC ####  
[address] [address]  
[phone]  
: John Ahumada MD   
   
                                                    MCHC (RBC)   
[Mass/Vol]      32.0 g/dL       Normal          28.4-34.8       [facility]  
   
                                        Comment on above:   Performed By: #### C  
DP, REJEC ####  
[address] [address]  
[phone]  
: John Ahumada MD   
   
                                                    MCV (RBC) [Entitic   
vol]            94.6 fL         Normal          82.6-102.9      [facility]  
   
                                        Comment on above:   Performed By: #### C  
DP, REJEC ####  
[address] [address]  
[phone]  
: John Ahumada MD   
   
                                                    Monocytes (Bld)   
[#/Vol]         0.55 10*3/uL    Normal          0.10-1.20       [facility]  
   
                                        Comment on above:   Performed By: #### C  
DP, REJEC ####  
[address] [address]  
[phone]  
: John Ahumada MD   
   
                                                    Monocytes/100 WBC   
(Bld)           5 %             Normal          3-12            [facility]  
   
                                        Comment on above:   Performed By: #### C  
DP, REJEC ####  
[address] [address]  
[phone]  
: John Ahumada MD   
   
                      Neutrophil (Seg) 83 %       High       36-65      [facility]  
   
                                        Comment on above:   Performed By: #### C  
DP, REJEC ####  
[address] [address]  
[phone]  
: John Ahumada MD   
   
                      NRBC Automated 0.0 per 100 WBC Normal     0.0        [facility]  
   
                                        Comment on above:   Performed By: #### C  
DP, REJEC ####  
[address] [address]  
[phone]  
: John Ahumada MD   
   
                                                    Platelet mean   
volume (Bld)   
[Entitic vol]   10.9 fL         Normal          8.1-13.5        [facility]  
   
                                        Comment on above:   Performed By: #### C  
DP, REJEC ####  
[address] [address]  
[phone]  
: John Ahumada MD   
   
                                                    Platelets (Bld)   
[#/Vol]         352 10*3/uL     Normal          138-453         [facility]  
   
                                        Comment on above:   Performed By: #### C  
DP, REJEC ####  
[facility]  
[address] [place] [address]  
[phone]  
: John Ahumada MD   
   
                      RBC (Bld) [#/Vol] 5.15 10*6/uL Normal     4.21-5.77  [facility]  
   
                                        Comment on above:   Performed By: #### C  
DP, REJEC ####  
[facility]  
[address] [place] [address]  
[phone]  
: John Ahumada MD   
   
                                                    RBC morphology   
finding Nom (Bld) ANISOCYTOSIS PRESENT Normal                          [facility]  
   
                                        Comment on above:   Performed By: #### C  
DP, REJEC ####  
[facility]  
[address] [place] [address]  
[phone]  
: John Ahumada MD   
   
                      WBC (Bld) [#/Vol] 11.3 10*3/uL Normal     3.5-11.3   [facility]  
   
                                        Comment on above:   Performed By: #### C  
DP, REJEC ####  
[facility]  
[address] [place] [address]  
[phone]  
: John Ahumada MD   
   
                                                    Liver Profileon 2023   
   
                      Albumin [Mass/Vol] 3.3 g/dL   Low        3.5-5.2    [facility]  
   
                                        Comment on above:   Performed By: #### C  
MPX ####  
[facility]  
[address] [place] [address]  
[phone]  
: John Ahumada MD   
   
                                                            Performed By: #### B  
C ####  
[facility]  
[address] [place] [address]  
[phone]  
: John Ahumada MD   
   
                      Albumin/Glob Ratio 1.1        Normal     1.0-2.5    [facility]  
   
                                        Comment on above:   Performed By: #### C  
MPX ####  
[address] [address]  
[phone]  
: John Ahumada MD   
   
                                                            Performed By: #### B  
C ####  
[address] [address]  
[phone]  
: John Ahumada MD   
   
                      Alkaline Phos 208 U/L    High            [facility]  
   
                                        Comment on above:   Performed By: #### C  
MPX ####  
[address] [address]  
[phone]  
: John Ahumada MD   
   
                                                            Performed By: #### B  
C ####  
[address] [address]  
[phone]  
: John Ahumada MD   
   
                                                    ALT [Catalytic   
activity/Vol]   40 U/L          Normal          5-41            [facility]  
   
                                        Comment on above:   Performed By: #### C  
MPX ####  
[address] [address]  
[phone]  
: John Ahumada MD   
   
                                                            Performed By: #### B  
C ####  
[address] [address]  
[phone]  
: John Ahumada MD   
   
                                                    AST [Catalytic   
activity/Vol]   30 U/L          Normal          <40             [facility]  
   
                                        Comment on above:   Performed By: #### C  
MPX ####  
[address] [address]  
[phone]  
: John Ahumada MD   
   
                                                            Performed By: #### B  
C ####  
[address] [address]  
[phone]  
: John Ahumada MD   
   
                                                    Bilirubin   
[Mass/Vol]      3.4 mg/dL       High            0.3-1.2         [facility]  
   
                                        Comment on above:   Performed By: #### C  
MPX ####  
[address] [address]  
[phone]  
: John Ahumada MD   
   
                                                            Performed By: #### B  
C ####  
[address] [address]  
[phone]  
: John Ahumada MD   
   
                      Bilirubin, Indirect 1.3 mg/dL  High       0.0-1.0    [facility]  
   
                                        Comment on above:   Performed By: #### C  
MPX ####  
[address] [address]  
[phone]  
: John Ahumada MD   
   
                                                            Performed By: #### B  
C ####  
[address] [address]  
[phone]  
: John Ahumada MD   
   
                                                    Bilirubin.indirect   
[Mass/Vol]      2.1 mg/dL       High            <0.3            [facility]  
   
                                        Comment on above:   Performed By: #### C  
MPX ####  
[address] [address]  
[phone]  
: John Ahumada MD   
   
                                                            Performed By: #### B  
C ####  
[address] [address]  
[phone]  
: John Ahumada MD   
   
                      Protein [Mass/Vol] 6.3 g/dL   Low        6.4-8.3    [facility]  
   
                                        Comment on above:   Performed By: #### C  
MPX ####  
[address] [address]  
[phone]  
: John Ahumada MD   
   
                                                            Performed By: #### B  
C ####  
[address] [address]  
[phone]  
: John Ahumada MD   
   
                                                    Specimen Rejectionon   
023   
   
                                                    Reason for   
rejection                               Unable to perform   
testing: Specimen   
hemolyzed.          Normal                                  [facility]  
   
                                        Comment on above:   Performed By: #### C  
DP, REJEC ####  
[address] [address]  
[phone]  
: John Ahumada MD   
   
                      Source of sample .BLOOD     Normal                [facility]  
   
                                        Comment on above:   Performed By: #### C  
DP, REJEC ####  
[facility]  
[address] [place] [id number] [phone]  
: John Ahumada MD   
   
                      Test ordered BMPX,LIVP  Normal                [facility]  
   
                                        Comment on above:   Performed By: #### C  
DP, REJEC ####  
[facility]  
[address] [place] [id number] [phone]  
: John Ahumada MD   
   
                                                    Surgical Pathology Reporton   
2023   
   
                                                    Surgical Pathology   
Report                                  (NOTE)  
Path Number:   
[id number]  
-- Diagnosis --  
GALLBLADDER,   
CHOLECYSTECTOMY:-CHRON  
IC CHOLECYSTITIS WITH  
CHOLELITHIASIS.  
Olga Nguyen  
**Electronically   
Signed Out**  
ag/2023  
Clinical Information  
Pre-op Diagnosis:   
CALCULUS OF   
GALLBLADDER WITH ACUTE   
CHOLECYSTITIS  
AND OBSTRUCTION  
Operative Findings:   
GALLBLADDER  
Operation Performed:   
CHOLECYSTECTOMY   
LAPAROSCOPIC, POSSIBLE   
OPEN  
kb  
Source of Specimen  
A: GALLBLADDER  
Gross Description  
 GEM JAFFE  Received   
in formalin is an 8.8   
x 4.9 x  
4.8 cm intact   
gallbladder with a 1.0   
cm long x 1.1 cm in   
diameter  
cystic duct. The   
serosa is pink-tan and   
dusky, and the liver   
bed is  
coarse tan-brown. The   
wall is 0.1 cm in   
thickness, and the   
lumen  
contains approximately   
30 cc of thin brown   
bile with bosselated   
yellow  
calculi, 4.5 x 2.0 x   
1.5 cm in aggregate.   
The mucosa is tan,  
flattened and faintly   
trabecular. Cystic   
duct margin and   
sections of  
gallbladder mucosa   
1cs. tm  
LRD/kb2:2023  
Microscopic   
Description  
Microscopic   
examination performed.  
Processing Lab: [address] [id number]  
Interpretation   
Performed at [address]   
[id number]  
SURGICAL PATHOLOGY   
CONSULTATION  
Patient Name:   
TAWANDA VILLARREAL  
[facility] Rec: [id number]  
[facility] [employer]  
CONSULTING   
PATHOLOGISTS   
[facility]  
ANATOMIC PATHOLOGY  
[address] [place] [address]   
[id number] [phone]  
Fax: [phone] [facility]  
   
                                                    XR SHUNT SERIES PLACEMENT (<  
4 VIEWS)on 2023   
   
                                                    XR SHUNT SERIES   
PLACEMENT (<4   
VIEWS)                                  EXAMINATION:  
SHUNT SERIES  
2023 11:02 am  
COMPARISON:  
None.  
HISTORY:  
ORDERING SYSTEM   
PROVIDED HISTORY:   
Evaluate shunt after   
lap sukumar  
TECHNOLOGIST PROVIDED   
HISTORY:  
Evaluate shunt after   
lap sukumar  
Reason for Exam: eval   
shunt post lap sukumar  
FINDINGS:  
Right occipital shunt   
catheter is present   
extending from the   
right neck into  
the right chest wall   
and abdomen   
terminating in the   
left lower quadrant of  
the pelvis.  
No evidence of bowel   
obstruction. No   
evidence of shunt   
discontinuity or  
focal kink.  
IMPRESSION:  
Shunt catheter as   
described terminating   
in the left lower   
quadrant of the  
pelvis.  
Interpreted by:  
Adarsh Kirkland MD  
Preliminary result  Normal                                  [facility]  
   
                                                    Basic Metabolic Profon    
   
                                                    Anion gap   
[Moles/Vol]     13 mmol/L       Normal          -17            [facility]  
   
                                        Comment on above:   Performed By: #### B  
C ####  
[address] [address]  
[phone]  
: John Ahumada MD   
   
                                                            Performed By: #### C  
DONA, REJEC ####  
[facility]  
[address] [place] [address]  
[phone]  
: John Ahumada MD   
   
                      Calcium [Mass/Vol] 9.1 mg/dL  Normal     8.6-10.4   [facility]  
   
                                        Comment on above:   Performed By: #### B  
C ####  
[facility]  
[address] [place] [address]  
[phone]  
: John Ahumada MD   
   
                                                            Performed By: #### C  
DP, REJEC ####  
[facility]  
[address] [place] [address]  
[phone]  
: John Ahumada MD   
   
                                                    Chloride   
[Moles/Vol]     103 mmol/L      Normal                    [facility]  
   
                                        Comment on above:   Performed By: #### B  
C ####  
[facility]  
[address] [place] [address]  
[phone]  
: John Ahumada MD   
   
                                                            Performed By: #### C  
DP, REJEC ####  
[address] [address]  
[phone]  
: John Ahumada MD   
   
                      CO2 [Moles/Vol] 22 mmol/L  Normal     20-31      [facility]  
   
                                        Comment on above:   Performed By: #### B  
C ####  
[address] [address]  
[phone]  
: John Ahumada MD   
   
                                                            Performed By: #### C  
DP, REJEC ####  
[address] [address]  
[phone]  
: John Ahumada MD   
   
                                                    Creatinine   
[Mass/Vol]      0.7 mg/dL       Normal          0.7-1.2         [facility]  
   
                                        Comment on above:   Result Comment: [facility]  
[facility]   
   
                                                            Performed By: #### B  
C ####  
[address] [address]  
[phone]  
: John Ahumada MD   
   
                                                            Performed By: #### C  
DP, REJEC ####  
[address] [address]  
[phone]  
: John Ahumada MD   
   
                                                    GFR/1.73 sq   
M.predicted among   
non-blacks MDRD   
(S/P/Bld) [Vol   
rate/Area]      mL/min/{1.73_m2} Normal          >60             [facility]  
   
                                        Comment on above:   Result Comment:  
These results are not intended for use in patients <18 years of   
age.  
eGFR results are calculated without a race factor using the    
CKD-EPI  
equation.  
Careful clinical correlation is recommended, particularly when   
comparing to  
results calculated using previous equations.  
The CKD-EPI equation is less accurate in patients with extremes of   
muscle mass,  
extra-renal metabolism of creatine, excessive creatine ingestion,   
or following  
therapy that affects renal tubular secretion.   
   
                                                            Performed By: #### B  
C ####  
[facility]  
[address] [place] [address]  
[phone]  
: John Ahumada MD   
   
                                                            Performed By: #### C  
DP, REJEC ####  
[address] [address]  
[phone]  
: John Ahumada MD   
   
                      Glucose [Mass/Vol] 94 mg/dL   Normal     70-99      [facility]  
   
                                        Comment on above:   Performed By: #### B  
C ####  
[address] [address]  
[phone]  
: John Ahumada MD   
   
                                                            Performed By: #### C  
DP, REJEC ####  
[address] [address]  
[phone]  
: John Ahumada MD   
   
                                                    Potassium   
[Moles/Vol]     4.2 mmol/L      Normal          3.7-5.3         [facility]  
   
                                        Comment on above:   Performed By: #### B  
C ####  
[address] [address]  
[phone]  
: Jhon Ahumada MD   
   
                                                            Performed By: #### C  
DP, REJEC ####  
[address] [address]  
[phone]  
: John Ahumada MD   
   
                      Sodium [Moles/Vol] 138 mmol/L Normal     135-144    [facility]  
   
                                        Comment on above:   Performed By: #### B  
C ####  
[address] [address]  
[phone]  
: John Ahumada MD   
   
                                                            Performed By: #### C  
DP, REJEC ####  
[address] [address]  
[phone]  
: John Ahumada MD   
   
                                                    Urea nitrogen   
[Mass/Vol]      17 mg/dL        Normal          8-23            [facility]  
   
                                        Comment on above:   Performed By: #### B  
C ####  
[address] [address]  
[phone]  
: John Ahumada MD   
   
                                                            Performed By: #### C  
DP, REJEC ####  
[address] [address]  
[phone]  
: John Ahumada MD   
   
                                                    CBC with Diffon 2023   
   
                      Abs. Basophil 0.03 k/uL  Normal     0.00-0.20  [facility]  
   
                                        Comment on above:   Performed By: #### B  
C ####  
[address] [address]  
[phone]  
: John Ahumada MD   
   
                                                            Performed By: #### C  
DP, REJEC ####  
[address] [address]  
[phone]  
: John Ahumada MD   
   
                      Abs.Imm.Granulocyte 0.06 k/uL  Normal     0.00-0.30  [facility]  
   
                                        Comment on above:   Performed By: #### B  
C ####  
[address] [address]  
[phone]  
: John Ahumada MD   
   
                                                            Performed By: #### C  
DP, REJEC ####  
[address] [address]  
[phone]  
: John Ahumada MD   
   
                                                    Abs.Neutrophil   
(Seg)           6.84 k/uL       Normal          1.50-8.10       [facility]  
   
                                        Comment on above:   Performed By: #### B  
C ####  
[address] [address]  
[phone]  
: John Ahumada MD   
   
                                                            Performed By: #### C  
DP, REJEC ####  
[address] [address]  
[phone]  
: John Ahumada MD   
   
                                                    Basophils/100 WBC   
(Bld)           0 %             Normal          0-2             [facility]  
   
                                        Comment on above:   Performed By: #### B  
C ####  
[address] [address]  
[phone]  
: John Ahumada MD   
   
                                                            Performed By: #### C  
DP, REJEC ####  
[address] [address]  
[phone]  
: John Ahumada MD   
   
                                                    Eosinophils (Bld)   
[#/Vol]         0.05 10*3/uL    Normal          0.00-0.44       [facility]  
   
                                        Comment on above:   Performed By: #### B  
C ####  
[address] [address]  
[phone]  
: John Ahumada MD   
   
                                                            Performed By: #### C  
DP, REJEC ####  
[address] [address]  
[phone]  
: John Ahumada MD   
   
                                                    Eosinophils/100 WBC   
(Bld)           1 %             Normal          1-4             [facility]  
   
                                        Comment on above:   Performed By: #### B  
C ####  
[address] [address]  
[phone]  
: John Ahumada MD   
   
                                                            Performed By: #### C  
DP, REJEC ####  
[address] [address]  
[phone]  
: John Ahumada MD   
   
                                                    Erythrocyte   
distribution width   
(RBC) [Ratio]   14.8 %          High            11.8-14.4       [facility]  
   
                                        Comment on above:   Performed By: #### B  
C ####  
[address] [address]  
[phone]  
: John Ahumada MD   
   
                                                            Performed By: #### C  
DP, REJEC ####  
[address] [address]  
[phone]  
: John Ahumada MD   
   
                                                    Hematocrit (Bld)   
[Volume fraction] 42.1 %          Normal          40.7-50.3       [facility]  
   
                                        Comment on above:   Performed By: #### B  
C ####  
[address] [address]  
[phone]  
: John Ahumada MD   
   
                                                            Performed By: #### C  
DP, REJEC ####  
[address] [place] [address]  
[phone]  
: John Ahumada MD   
   
                                                    Hemoglobin (Bld)   
[Mass/Vol]      14.1 g/dL       Normal          13.0-17.0       [facility]  
   
                                        Comment on above:   Performed By: #### B  
C ####  
[address] [address]  
[phone]  
: John Ahumada MD   
   
                                                            Performed By: #### C  
DP, REJEC ####  
[address] [address]  
[phone]  
: John Ahumada MD   
   
                                                    Immature   
granulocytes/100   
WBC (Bld)       1 %             High            0               [facility]  
   
                                        Comment on above:   Performed By: #### B  
C ####  
[address] [address]  
[phone]  
: John Ahumada MD   
   
                                                            Performed By: #### C  
DP, REJEC ####  
[address] [address]  
[phone]  
: John Ahumada MD   
   
                                                    Lymphocytes (Bld)   
[#/Vol]         1.69 10*3/uL    Normal          1.10-3.70       [facility]  
   
                                        Comment on above:   Performed By: #### B  
C ####  
[address] [address]  
[phone]  
: John Ahumada MD   
   
                                                            Performed By: #### C  
DP, REJEC ####  
[address] [address]  
[phone]  
: John Ahumada MD   
   
                                                    Lymphocytes/100 WBC   
(Bld)           18 %            Low             24-43           [facility]  
   
                                        Comment on above:   Performed By: #### B  
C ####  
[address] [address]  
[phone]  
: John Ahumada MD   
   
                                                            Performed By: #### C  
DP, REJEC ####  
[address] [address]  
[phone]  
: John Ahumada MD   
   
                                                    MCH (RBC) [Entitic   
mass]           30.4 pg         Normal          25.2-33.5       [facility]  
   
                                        Comment on above:   Performed By: #### B  
C ####  
[address] [address]  
[phone]  
: John Ahumada MD   
   
                                                            Performed By: #### C  
DP, REJEC ####  
[address] [address]  
[phone]  
: John Ahumada MD   
   
                                                    MCHC (RBC)   
[Mass/Vol]      33.5 g/dL       Normal          28.4-34.8       [facility]  
   
                                        Comment on above:   Performed By: #### B  
C ####  
[address] [address]  
[phone]  
: John Ahumada MD   
   
                                                            Performed By: #### C  
DP, REJEC ####  
[address] [address]  
[phone]  
: John Ahumada MD   
   
                                                    MCV (RBC) [Entitic   
vol]            90.7 fL         Normal          82.6-102.9      [facility]  
   
                                        Comment on above:   Performed By: #### B  
C ####  
[address] [address]  
[phone]  
: John Ahumada MD   
   
                                                            Performed By: #### C  
DP, REJEC ####  
[address] [address]  
[phone]  
: John Ahumada MD   
   
                                                    Monocytes (Bld)   
[#/Vol]         0.84 10*3/uL    Normal          0.10-1.20       [facility]  
   
                                        Comment on above:   Performed By: #### B  
C ####  
[address] [address]  
[phone]  
: John Ahumada MD   
   
                                                            Performed By: #### C  
DP, REJEC ####  
[address] [address]  
[phone]  
: John Ahumada MD   
   
                                                    Monocytes/100 WBC   
(Bld)           9 %             Normal          3-12            [facility]  
   
                                        Comment on above:   Performed By: #### B  
C ####  
[address] [address]  
[phone]  
: John Ahumada MD   
   
                                                            Performed By: #### C  
DP, REJEC ####  
[address] [address]  
[phone]  
: John Ahumada MD   
   
                      Neutrophil (Seg) 71 %       High       36-65      [facility]  
   
                                        Comment on above:   Performed By: #### B  
C ####  
[address] [address]  
[phone]  
: John Ahumada MD   
   
                                                            Performed By: #### C  
DP, REJEC ####  
[address] [address]  
[phone]  
: John Ahumada MD   
   
                      NRBC Automated 0.0 per 100 WBC Normal     0.0        [facility]  
   
                                        Comment on above:   Performed By: #### B  
C ####  
[address] [address]  
[phone]  
: John Ahumada MD   
   
                                                            Performed By: #### C  
DP, REJEC ####  
[address] [address]  
[phone]  
: John Ahumada MD   
   
                                                    Platelet mean   
volume (Bld)   
[Entitic vol]   11.3 fL         Normal          8.1-13.5        [facility]  
   
                                        Comment on above:   Performed By: #### B  
C ####  
[address] [address]  
[phone]  
: John Ahumada MD   
   
                                                            Performed By: #### C  
DP, REJEC ####  
[address] [address]  
[phone]  
: John Ahumada MD   
   
                                                    Platelets (Bld)   
[#/Vol]         283 10*3/uL     Normal          138-453         [facility]  
   
                                        Comment on above:   Performed By: #### B  
C ####  
[address] [address]  
[phone]  
: John Ahumada MD   
   
                                                            Performed By: #### C  
DP, REJEC ####  
[address] [address]  
[phone]  
: John Ahumada MD   
   
                      RBC (Bld) [#/Vol] 4.64 10*6/uL Normal     4.21-5.77  [facility]  
   
                                        Comment on above:   Performed By: #### B  
C ####  
[address] [address]  
[phone]  
: John Ahumada MD   
   
                                                            Performed By: #### C  
DP, REJEC ####  
[address] [address]  
[phone]  
: John Ahumada MD   
   
                                                    RBC morphology   
finding Nom (Bld) ANISOCYTOSIS PRESENT Normal                          [facility]  
   
                                        Comment on above:   Performed By: #### B  
C ####  
[address] [address]  
[phone]  
: John Ahumada MD   
   
                                                            Performed By: #### C  
DP, REJEC ####  
[address] [address]  
[phone]  
: John Ahumada MD   
   
                      WBC (Bld) [#/Vol] 9.5 10*3/uL Normal     3.5-11.3   [facility]  
   
                                        Comment on above:   Performed By: #### B  
C ####  
[address] [address]  
[phone]  
: John Auhmada MD   
   
                                                            Performed By: #### C  
DP, REJEC ####  
[address] [address]  
[phone]  
: John Ahumada MD   
   
                                                    Extra Lavender Tubeon 2023   
   
                      Extra Lavender Tube            Normal                [facility]  
   
                                        Comment on above:   Performed By: #### B  
C ####  
[address] [address]  
[phone]  
: John Ahumada MD   
   
                                                            Performed By: #### C  
DP, REJEC ####  
[address] [address]  
[phone]  
: John Ahumada MD   
   
                                                    Liver Profileon 2023   
   
                      Albumin [Mass/Vol] 3.2 g/dL   Low        3.5-5.2    [facility]  
   
                                        Comment on above:   Performed By: #### B  
C ####  
[address] [address]  
[phone]  
: John Ahumada MD   
   
                                                            Performed By: #### C  
DP, REJEC ####  
[address] [address]  
[phone]  
: John Ahumada MD   
   
                      Albumin/Glob Ratio 1.1        Normal     1.0-2.5    [facility]  
   
                                        Comment on above:   Performed By: #### B  
C ####  
[address] [address]  
[phone]  
: John Ahumada MD   
   
                                                            Performed By: #### C  
DP, REJEC ####  
[address] [address]  
[phone]  
: John Ahumada MD   
   
                      Alkaline Phos 238 U/L    High            [facility]  
   
                                        Comment on above:   Performed By: #### B  
C ####  
[address] [address]  
[phone]  
: John hAumada MD   
   
                                                            Performed By: #### C  
DP, REJEC ####  
[address] [address]  
[phone]  
: John Ahumada MD   
   
                                                    ALT [Catalytic   
activity/Vol]   43 U/L          High            5-41            [facility]  
   
                                        Comment on above:   Performed By: #### B  
C ####  
[address] [address]  
[phone]  
: John Ahumada MD   
   
                                                            Performed By: #### C  
DP, REJEC ####  
[address] [address]  
[phone]  
: John Ahumada MD   
   
                                                    AST [Catalytic   
activity/Vol]   26 U/L          Normal          <40             [facility]  
   
                                        Comment on above:   Performed By: #### B  
C ####  
[address] [address]  
[phone]  
: John Ahumada MD   
   
                                                            Performed By: #### C  
DP, REJEC ####  
[address] [address]  
[phone]  
: John Ahumada MD   
   
                                                    Bilirubin   
[Mass/Vol]      3.5 mg/dL       High            0.3-1.2         [facility]  
   
                                        Comment on above:   Performed By: #### B  
C ####  
[address] [address]  
[phone]  
: John Ahumada MD   
   
                                                            Performed By: #### C  
DP, REJEC ####  
[address] [address]  
[phone]  
: John Ahumada MD   
   
                      Bilirubin, Indirect 1.1 mg/dL  High       0.0-1.0    [facility]  
   
                                        Comment on above:   Performed By: #### B  
C ####  
[address] [address]  
[phone]  
: John Ahumada MD   
   
                                                            Performed By: #### C  
DP, REJEC ####  
[address] [address]  
[phone]  
: John Ahumada MD   
   
                                                    Bilirubin.indirect   
[Mass/Vol]      2.4 mg/dL       High            <0.3            [facility]  
   
                                        Comment on above:   Performed By: #### B  
C ####  
[address] [address]  
[phone]  
: John Ahumada MD   
   
                                                            Performed By: #### C  
DP, REJEC ####  
[address] [address]  
[phone]  
: John Ahumada MD   
   
                      Protein [Mass/Vol] 6.2 g/dL   Low        6.4-8.3    [facility]  
   
                                        Comment on above:   Performed By: #### B  
C ####  
[address] [address]  
[phone]  
: John Ahumada MD   
   
                                                            Performed By: #### C  
DP, REJEC ####  
[address] [address]  
[phone]  
: John Ahumada MD   
   
                                                    Non-Gyn Cytologyon 11-  
3   
   
                      Case No:   [id number]    Normal                [facility]  
   
                                        Comment on above:   Performed By: #### N  
GYN ####  
[address] [address]  
[phone]  
: John Ahumada MD   
   
                                                            Performed By: #### B  
C ####  
[address] [address]  
[phone]  
: John Ahumada MD   
   
                                                    XR SKULL (<4 VIEWS)on 2023   
   
                                        XR SKULL (<4 VIEWS) EXAMINATION:  
THREE XRAY VIEWS OF   
THE SKULL  
2023 4:33 pm  
COMPARISON:  
2023  
HISTORY:  
ORDERING SYSTEM   
PROVIDED HISTORY:   
obtain view   
perpendicular to shunt   
valve to  
eval setting  
TECHNOLOGIST PROVIDED   
HISTORY:  
obtain view   
perpendicular to shunt   
valve to eval setting  
Reason for Exam: shunt   
setting  
FINDINGS:  
Parietal approach   
shunt catheter in   
place. No apparent   
discontinuity or  
kinking. Bowel appears   
to be set at 1.5.  
IMPRESSION:  
Valve appears to be   
set at 1.5.  
Interpreted by:  
Akbar Reilly MD  
Signed by:  
Akbar Reilly MD  
23  
Final result        Normal                                  [facility]  
   
                                                    ED Note-Physicianon 20   
   
                                        ED Note-Physician   [id number]  
10  
[id number]  
[id number]               Normal                                  [facility]   
[facility]  
   
                                                    FLUORO FOR SURGICAL PROCEDUR  
ESon 2023   
   
                                                    FLUORO FOR SURGICAL   
PROCEDURES                              Radiology exam is   
complete. No   
Radiologist dictation.   
Please follow up with   
ordering provider.  
  
  
Final result        Normal                                  [facility]  
   
                                                    Surgical Pathology Reporton   
2023   
   
                                                    Surgical Pathology   
Report                                  (NOTE)  
Path Number:   
[id number]  
-- Diagnosis --  
A. Stomach, endoscopic   
biopsy:  
Mild chronic inactive   
gastritis, negative   
for Helicobacter   
pylori.  
Neither intestinal   
metaplasia nor   
dysplasia is seen.  
B. Small intestine,   
duodenum, endoscopic   
biopsy:  
Normal small   
intestinal mucosa.  
LY Dumont.  
**Electronically   
Signed Out**  
  
Clinical Information  
Pre-Op Diagnosis:   
OBSTRUCTIVE JAUNDICE  
Operative Findings:   
GASTRIC BX; DUODENAL   
BX  
Operation Performed:   
ERCP STONE REMOVAL;   
ENDOSCOPIC ULTRASOUND   
WITH  
PATHOLOGY; EGD BIOPSY  
mj  
Source of Specimen  
A: GASTRIC BX  
B: DUODENAL BX  
Gross Description  
A.  TAWANDA VILLARREAL,   
GASTRIC BX  Received   
in formalin are two  
pink-tan tissue   
fragments, 0.2 and 0.8   
cm and are 1.0 x 0.2 x   
0.2 cm  
in aggregate. Entirely   
1 cs.  
B.  TAWANDA VILLARREAL,   
DUODENAL BX  Received   
in formalin are two  
pink-tan tissue   
fragments, 0.3 cm each   
and are 0.6 x 0.2 x   
0.2 cm in  
aggregate. Entirely 1   
cs. jbrittanie mj  
RDD/mj:2023  
Microscopic   
Description  
A, B. 2 AMAIRANI reviewed   
for each. Microscopic   
examination performed.  
Processing Lab: [facility] [address] [place] [address]  
Interpretation   
Performed at   
[address]   
[place] [address]  
SURGICAL PATHOLOGY   
CONSULTATION  
Patient Name:   
TAWANDA VILLARREAL  
[facility] Rec: [id number]  
[employer]  
CONSULTING   
[facility]   
[facility]  
ANATOMIC PATHOLOGY  
[address], [place]   
[address] [phone]  
Fax: [phone] Normal                                  [facility]  
   
                                                    Surgical Pathology   
Report                                  (NOTE)  
Path Number:   
[id number]  
INTERPRETATION  
FINE NEEDLE ASPIRATION   
EUS ROSA HEPATIS   
LYMPH NODE:  
- NEGATIVE FOR   
MALIGNANCY.  
  
**Electronically   
Signed Out**  
Olga Wendy  
ag/2023  
Source of Specimen:  
A: FINE NEEDLE   
ASPIRATION EUS ROSA   
HEPATIS LYMPH NODE  
Clinical History  
Obstructive jaundice   
K83.1.  
Gross Description  
 ROSA HEPATIS LYMPH   
NODE  Specimen   
received in CytoLyt   
solution,  
light red fluid.  
MICROSCOPIC   
DESCRIPTION  
Microscopic   
examination performed.  
Non Gyn Thin Prep x 1,   
Cell Block w/ AMAIRANI x   
1  
Processing Lab: [address] [address]  
Interpretation   
performed at [address]   
[address]  
NONGYNECOLOGICAL   
CYTOPATHOLOGY   
CONSULTATION  
Patient Name:   
TAWANDA VILLARREAL  
[facility] Rec: [id number]  
[facility]  
CONSULTING   
PATHOLOGISTS   
COR[facility]  
ANATOMIC PATHOLOGY  
[address] [place] [address]   
[address] [phone]  
Fax: [phone] Normal                                  [facility]  
   
                                                    US GI ENDOSCOPIC S AND Ion 1  
2023   
   
                                                    US GI ENDOSCOPIC S   
AND I                                   Radiology exam is   
complete. No   
Radiologist dictation.   
Please follow up with   
ordering provider.  
  
  
Final result        Normal                                  [facility]  
   
                                                    Anti-Mitochondrial Abon -   
   
                                                    Anti-Mitochondrial   
Ab              4.2 U/mL        High            0.0-4.0         [facility]  
   
                                        Comment on above:   Result Comment:  
Reference Range:  
<4.0 Negative  
4.0-6.0 Equivocal  
>6.0 Positive  
When results are Equivocal, it is recommended to retest after 8-12   
weeks.   
   
                                                            Performed By: #### C  
MPX ####  
[address] [address] [phone]  
: John Ahumada MD   
   
                                                            Performed By: #### A  
NCACP, AMAB ####  
[facility]  
[address] [place] [id number] [phone]  
: John Ahumada MD   
   
                                                    CBC with Diffon 2023   
   
                      Abs. Basophil 0.08 k/uL  Normal     0.00-0.20  [facility]  
   
                                        Comment on above:   Performed By: #### C  
MPX ####  
[address] [address]  
[phone]  
: John Ahumada MD   
   
                                                            Performed By: #### B  
C ####  
[address] [address]  
[phone]  
: John Ahumada MD   
   
                      Abs.Imm.Granulocyte 0.06 k/uL  Normal     0.00-0.30  [facility]  
   
                                        Comment on above:   Performed By: #### C  
MPX ####  
[address] [address]  
[phone]  
: John Ahumada MD   
   
                                                            Performed By: #### B  
C ####  
[place] [address]  
[phone]  
: John Ahumada MD   
   
                                                    Abs.Neutrophil   
(Seg)           3.77 k/uL       Normal          1.50-8.10       [facility]  
   
                                        Comment on above:   Performed By: #### C  
MPX ####  
[address] [address]  
[phone]  
: John Ahumada MD   
   
                                                            Performed By: #### B  
C ####  
[address] [address]  
[phone]  
: John Ahumada MD   
   
                                                    Basophils/100 WBC   
(Bld)           1 %             Normal          0-2             [facility]  
   
                                        Comment on above:   Performed By: #### C  
MPX ####  
[address] [address]  
[phone]  
: John Ahumada MD   
   
                                                            Performed By: #### B  
C ####  
[address] [address]  
[phone]  
: John Ahumada MD   
   
                                                    Eosinophils (Bld)   
[#/Vol]         0.18 10*3/uL    Normal          0.00-0.44       [facility]  
   
                                        Comment on above:   Performed By: #### C  
MPX ####  
[address] [address]  
[phone]  
: John Ahumada MD   
   
                                                            Performed By: #### B  
C ####  
[address] [address]  
[phone]  
: John Ahumada MD   
   
                                                    Eosinophils/100 WBC   
(Bld)           3 %             Normal          1-4             [facility]  
   
                                        Comment on above:   Performed By: #### C  
MPX ####  
[address] [address]  
[phone]  
: John Ahumada MD   
   
                                                            Performed By: #### B  
C ####  
[address] [address]  
[phone]  
: John Ahumada MD   
   
                                                    Erythrocyte   
distribution width   
(RBC) [Ratio]   15.9 %          High            11.8-14.4       [facility]  
   
                                        Comment on above:   Performed By: #### C  
MPX ####  
[address] [address]  
[phone]  
: John Ahumada MD   
   
                                                            Performed By: #### B  
C ####  
[address] [address]  
[phone]  
: John Ahumada MD   
   
                                                    Hematocrit (Bld)   
[Volume fraction] 43.3 %          Normal          40.7-50.3       [facility]  
   
                                        Comment on above:   Performed By: #### C  
MPX ####  
[address] [address]  
[phone]  
: John Ahumada MD   
   
                                                            Performed By: #### B  
C ####  
[address] [address]  
[phone]  
: John Ahumada MD   
   
                                                    Hemoglobin (Bld)   
[Mass/Vol]      13.7 g/dL       Normal          13.0-17.0       [facility]  
   
                                        Comment on above:   Performed By: #### C  
MPX ####  
[address] [address]  
[phone]  
: John Ahumada MD   
   
                                                            Performed By: #### B  
C ####  
[address] [address]  
[phone]  
: John Ahumada MD   
   
                                                    Immature   
granulocytes/100   
WBC (Bld)       1 %             High            0               [facility]  
   
                                        Comment on above:   Performed By: #### C  
MPX ####  
[address] [address]  
[phone]  
: John Ahumada MD   
   
                                                            Performed By: #### B  
C ####  
[address] [address]  
[phone]  
: John Ahumada MD   
   
                                                    Lymphocytes (Bld)   
[#/Vol]         1.84 10*3/uL    Normal          1.10-3.70       [facility]  
   
                                        Comment on above:   Performed By: #### C  
MPX ####  
[address] [address]  
[phone]  
: John Ahumada MD   
   
                                                            Performed By: #### B  
C ####  
[address] [address]  
[phone]  
: John Ahumada MD   
   
                                                    Lymphocytes/100 WBC   
(Bld)           28 %            Normal          24-43           [facility]  
   
                                        Comment on above:   Performed By: #### C  
MPX ####  
[address] [address]  
[phone]  
: John Ahumada MD   
   
                                                            Performed By: #### B  
C ####  
[address] [address]  
[phone]  
: John Ahumada MD   
   
                                                    MCH (RBC) [Entitic   
mass]           29.8 pg         Normal          25.2-33.5       [facility]  
   
                                        Comment on above:   Performed By: #### C  
MPX ####  
[address] [address]  
[phone]  
: John Ahumada MD   
   
                                                            Performed By: #### B  
C ####  
[address] [address]  
[phone]  
: John Ahumada MD   
   
                                                    MCHC (RBC)   
[Mass/Vol]      31.6 g/dL       Normal          28.4-34.8       [facility] [facility]  
   
                                        Comment on above:   Performed By: #### C  
MPX ####  
[address] [address]  
[phone]  
: John Ahumada MD   
   
                                                            Performed By: #### B  
C ####  
[address] [address]  
[phone]  
: John Ahumada MD   
   
                                                    MCV (RBC) [Entitic   
vol]            94.3 fL         Normal          82.6-102.9      [facility]  
   
                                        Comment on above:   Performed By: #### C  
MPX ####  
[address] [address]  
[phone]  
: John Ahumada MD   
   
                                                            Performed By: #### B  
C ####  
[address] [address]  
[phone]  
: John Ahumada MD   
   
                                                    Monocytes (Bld)   
[#/Vol]         0.66 10*3/uL    Normal          0.10-1.20       [facility]  
   
                                        Comment on above:   Performed By: #### C  
MPX ####  
[address] [address]  
[phone]  
: John Ahumada MD   
   
                                                            Performed By: #### B  
C ####  
[address] [address]  
[phone]  
: John Ahumada MD   
   
                                                    Monocytes/100 WBC   
(Bld)           10 %            Normal          3-12            [facility]  
   
                                        Comment on above:   Performed By: #### C  
MPX ####  
[address] [address]  
[phone]  
: John Ahumada MD   
   
                                                            Performed By: #### B  
C ####  
[address] [address]  
[phone]  
: John Ahumada MD   
   
                      Neutrophil (Seg) 57 %       Normal     36-65      [facility]  
   
                                        Comment on above:   Performed By: #### C  
MPX ####  
[address] [address]  
[phone]  
: John Ahumada MD   
   
                                                            Performed By: #### B  
C ####  
[address] [address]  
[phone]  
: John Ahumada MD   
   
                      NRBC Automated 0.0 per 100 WBC Normal     0.0        [facility]  
   
                                        Comment on above:   Performed By: #### C  
MPX ####  
[address] [address]  
[phone]  
: John Ahumada MD   
   
                                                            Performed By: #### B  
C ####  
[address] [address]  
[phone]  
: John Ahumada MD   
   
                                                    Platelet mean   
volume (Bld)   
[Entitic vol]   10.6 fL         Normal          8.1-13.5        [facility]  
   
                                        Comment on above:   Performed By: #### C  
MPX ####  
[address] [address]  
[phone]  
: John Ahumada MD   
   
                                                            Performed By: #### B  
C ####  
[address] [address]  
[phone]  
: John Ahumada MD   
   
                                                    Platelets (Bld)   
[#/Vol]         270 10*3/uL     Normal          138-453         [facility]  
   
                                        Comment on above:   Performed By: #### C  
MPX ####  
[address] [address]  
[phone]  
: John Ahumada MD   
   
                                                            Performed By: #### B  
C ####  
[address] [address]  
[phone]  
: John Ahumada MD   
   
                      RBC (Bld) [#/Vol] 4.59 10*6/uL Normal     4.21-5.77  [facility]  
   
                                        Comment on above:   Performed By: #### C  
MPX ####  
[address] [address]  
[phone]  
: John Ahumada MD   
   
                                                            Performed By: #### B  
C ####  
[address] [address]  
[phone]  
: John Ahumada MD   
   
                                                    RBC morphology   
finding Nom (Bld) ANISOCYTOSIS PRESENT Normal                          [facility]  
   
                                        Comment on above:   Performed By: #### C  
MPX ####  
[address] [address]  
[phone]  
: John Ahumada MD   
   
                                                            Performed By: #### B  
C ####  
[address] [address]  
[phone]  
: John Ahumada MD   
   
                      WBC (Bld) [#/Vol] 6.6 10*3/uL Normal     3.5-11.3   [facility]  
   
                                        Comment on above:   Performed By: #### C  
MPX ####  
[address] [address]  
[phone]  
: John Ahumada MD   
   
                                                            Performed By: #### B  
C ####  
[address] [address]  
[phone]  
: John Ahumada MD   
   
                                                    Comp Metabolic Profon 2023   
   
                                                    Creatinine   
[Mass/Vol]      0.7 mg/dL       Normal          0.7-1.2         [facility]  
   
                                        Comment on above:   Result Comment: ICTE  
[facility] [facility]   
   
                                                            Performed By: #### C  
MPX ####  
[address] [place] [address]  
[phone]  
: John Ahumada MD   
   
                                                            Performed By: #### B  
C ####  
[address] [address]  
[phone]  
: John Ahumada MD   
   
                                                    GFR/1.73 sq   
M.predicted among   
non-blacks MDRD   
(S/P/Bld) [Vol   
rate/Area]      mL/min/{1.73_m2} Normal          >60             [facility]  
   
                                        Comment on above:   Result Comment:  
These results are not intended for use in patients <18 years of   
age.  
eGFR results are calculated without a race factor using the    
CKD-EPI  
equation.  
Careful clinical correlation is recommended, particularly when   
comparing to  
results calculated using previous equations.  
The CKD-EPI equation is less accurate in patients with extremes of   
muscle mass,  
extra-renal metabolism of creatine, excessive creatine ingestion,   
or following  
therapy that affects renal tubular secretion.   
   
                                                            Performed By: #### C  
MPX ####  
[address] [address]  
[phone]  
: John Ahumada MD   
   
                                                            Performed By: #### B  
C ####  
[facility]  
[address] [place] [address]  
[phone]  
: John Ahumada MD   
   
                      Albumin [Mass/Vol] 3.2 g/dL   Low        3.5-5.2    [facility]  
   
                                        Comment on above:   Performed By: #### C  
MPX ####  
[facility]  
[address] [place] [address]  
[phone]  
: John Ahumada MD   
   
                                                            Performed By: #### B  
C ####  
[facility]  
[address] [place] [address]  
[phone]  
: John Ahumada MD   
   
                      Albumin/Glob Ratio 1.1        Normal     1.0-2.5    [facility]  
   
                                        Comment on above:   Performed By: #### C  
MPX ####  
[facility]  
[address] [place] [address]  
[phone]  
: John Ahumada MD   
   
                                                            Performed By: #### B  
C ####  
[address] [address]  
[phone]  
: John Ahumada MD   
   
                      Alkaline Phos 300 U/L    High            [facility]  
   
                                        Comment on above:   Performed By: #### C  
MPX ####  
[address] [address]  
[phone]  
: John Ahumada MD   
   
                                                            Performed By: #### B  
C ####  
[address] [address]  
[phone]  
: John Ahumada MD   
   
                                                    ALT [Catalytic   
activity/Vol]   76 U/L          High            5-41            [facility]  
   
                                        Comment on above:   Performed By: #### C  
MPX ####  
[address] [address]  
[phone]  
: John Ahumada MD   
   
                                                            Performed By: #### B  
C ####  
[address] [address]  
[phone]  
: John Ahumada MD   
   
                                                    Anion gap   
[Moles/Vol]     11 mmol/L       Normal          9-17            [facility]  
   
                                        Comment on above:   Performed By: #### C  
MPX ####  
[address] [address]  
[phone]  
: John Ahumada MD   
   
                                                            Performed By: #### B  
C ####  
[address] [address]  
[phone]  
: John Ahumada MD   
   
                                                    AST [Catalytic   
activity/Vol]   50 U/L          High            <40             [facility]  
   
                                        Comment on above:   Performed By: #### C  
MPX ####  
[address] [address]  
[phone]  
: John Ahumada MD   
   
                                                            Performed By: #### B  
C ####  
[address] [address]  
[phone]  
: John Ahumada MD   
   
                                                    Bilirubin   
[Mass/Vol]      4.2 mg/dL       High            0.3-1.2         [facility]  
   
                                        Comment on above:   Performed By: #### C  
MPX ####  
[address] [address]  
[phone]  
: John Ahumada MD   
   
                                                            Performed By: #### B  
C ####  
[address] [address]  
[phone]  
: John Ahumada MD   
   
                      Calcium [Mass/Vol] 8.5 mg/dL  Low        8.6-10.4   [facility]  
   
                                        Comment on above:   Performed By: #### C  
MPX ####  
[address] [address]  
[phone]  
: John Ahumada MD   
   
                                                            Performed By: #### B  
C ####  
[address] [address]  
[phone]  
: John Ahumada MD   
   
                                                    Chloride   
[Moles/Vol]     105 mmol/L      Normal                    [facility]  
   
                                        Comment on above:   Performed By: #### C  
MPX ####  
[address] [address]  
[phone]  
: John Ahumada MD   
   
                                                            Performed By: #### B  
C ####  
[address] [address]  
[phone]  
: John Ahumada MD   
   
                      CO2 [Moles/Vol] 22 mmol/L  Normal     20-31      [facility]  
   
                                        Comment on above:   Performed By: #### C  
MPX ####  
[address] [address]  
[phone]  
: John Ahumada MD   
   
                                                            Performed By: #### B  
C ####  
[address] [address]  
[phone]  
: John Ahumada MD   
   
                      Glucose [Mass/Vol] 82 mg/dL   Normal     70-99      [facility]  
   
                                        Comment on above:   Performed By: #### C  
MPX ####  
[address] [address]  
[phone]  
: John Ahumada MD   
   
                                                            Performed By: #### B  
C ####  
[address] [address]  
[phone]  
: John Ahumada MD   
   
                                                    Potassium   
[Moles/Vol]     4.2 mmol/L      Normal          3.7-5.3         [facility]  
   
                                        Comment on above:   Performed By: #### C  
MPX ####  
[address] [address]  
[phone]  
: John Ahumada MD   
   
                                                            Performed By: #### B  
C ####  
[address] [address]  
[phone]  
: John Ahumada MD   
   
                      Protein [Mass/Vol] 6.2 g/dL   Low        6.4-8.3    [facility]  
   
                                        Comment on above:   Performed By: #### C  
MPX ####  
[address] [address]  
[phone]  
: John Ahumada MD   
   
                                                            Performed By: #### B  
C ####  
[address] [address]  
[phone]  
: John Ahumada MD   
   
                      Sodium [Moles/Vol] 138 mmol/L Normal     135-144    [facility]  
   
                                        Comment on above:   Performed By: #### C  
MPX ####  
[address] [address]  
[phone]  
: John Ahumada MD   
   
                                                            Performed By: #### B  
C ####  
[address] [address]  
[phone]  
: John Ahumada MD   
   
                                                    Urea nitrogen   
[Mass/Vol]      11 mg/dL        Normal          8-23            [facility]  
   
                                        Comment on above:   Performed By: #### C  
MPX ####  
[address] [address]  
[phone]  
: John Ahumada MD   
   
                                                            Performed By: #### B  
C ####  
[address] [address]  
[phone]  
: John Ahumada MD   
   
                                                    Neutrophil Cytopl Abon    
   
                      MPO-ANCA   0.3 AU/mL  Normal     0.0-3.5    [facility]  
   
                                        Comment on above:   Result Comment:  
Reference Range:  
<3.5 Negative  
3.5-5.0 Equivocal  
>5.0 Positive   
   
                                                            Performed By: #### C  
MPX ####  
[address] [address]  
[phone]  
: John Ahumada MD   
   
                                                            Performed By: #### A  
ELIZABETH, AMAB ####  
[address] [address]  
[phone]  
: John Ahumada MD   
   
                      PR3-ANCA   <0.7       Normal     0.0-2.0    [facility]  
   
                                        Comment on above:   Result Comment:  
Reference Range:  
<2.0 Negative  
2.0-3.0 Equivocal  
>3.0 Positive   
   
                                                            Performed By: #### C  
MPX ####  
[address] [address]  
[phone]  
: John Ahumada MD   
   
                                                            Performed By: #### A  
ELIZABETH, AMAB ####  
[address] [address]  
[phone]  
: John Ahumada MD   
   
                                                    XR SKULL (<4 VIEWS)on 2023   
   
                                        XR SKULL (<4 VIEWS) EXAMINATION:  
3 XRAY VIEWS OF THE   
SKULL  
2023 6:20 pm  
COMPARISON:  
2023 at 11:00   
a.m.  
HISTORY:  
ORDERING SYSTEM   
PROVIDED HISTORY: f/u   
shunt setting after   
MRI, please do  
perpendicular to shunt  
TECHNOLOGIST PROVIDED   
HISTORY:  
f/u shunt setting   
after MRI, please do   
perpendicular to shunt  
Reason for Exam: f/u   
shunt setting after   
MRI. perpendicular to   
shunt.  
FINDINGS:  
There is right   
parietal   
ventriculoperitoneal   
shunt tube in position   
noted.  
The intracerebral and   
extracranial   
components of the    
shunt appear to be  
intact without kinking   
and without   
discontinuity.  
IMPRESSION:  
Intact components of   
the right   
ventriculoperitoneal   
shunt tube without any  
kinking.  
Interpreted by:  
Markel Nowak MD  
Signed by:  
Markel Nowak MD  
23  
Final result        Normal                                  [facility]  
   
                                        XR SKULL (<4 VIEWS) EXAMINATION:  
THREE XRAY VIEWS OF   
THE SKULL  
2023 5:54 pm  
COMPARISON:  
None  
HISTORY:  
ORDERING SYSTEM   
PROVIDED HISTORY:   
shunt setting check,   
please do  
perpendicular to shunt  
TECHNOLOGIST PROVIDED   
HISTORY:  
shunt setting check,   
please do   
perpendicular to shunt  
Reason for Exam: Shunt   
setting  
FINDINGS:  
There is   
redemonstration of a   
 shunt catheter. No   
evidence of catheter  
disruption or kinking.  
The patient is   
edentulous. The   
calvarium is intact.  
IMPRESSION:  
 shunt catheter   
stable in position. No   
evidence of catheter   
disruption or  
kinking.  
Interpreted by:  
Trip Arenas MD  
Signed by:  
Trip Arenas MD  
23  
Final result        Normal                                  [facility]  
   
                                                    LAXMI Screen w/reflexon 2023   
   
                      LAXMI Screen Negative   Normal     NEG        [facility]  
   
                                        Comment on above:   Performed By: #### C  
GABRIEL CARCAMO ####  
[place] [address]  
[phone]  
: John Ahumada MD   
   
                                                            Performed By: #### A  
THIAGO JOHNSON ####  
[facility]  
[address] [place] [id number] [phone]  
: John Ahumada MD   
   
                      Anti-dsDNA 2.3 IU/mL  Normal     <10.0      [facility]  
   
                                        Comment on above:   Result Comment:  
Reference Range:  
<10.0 Negative  
10.0-15.0 Equivocal  
>15.0 Positive   
   
                                                            Performed By: #### C  
GABRIEL CARCAMO ####  
[facility]  
[address] [place] [id number] [phone]  
: John Ahumada MD   
   
                                                            Performed By: #### A  
NCACP, AMAB ####  
[facility]  
[facility] [place] [address]  
[phone]  
: John Ahumada MD   
   
                      CURTIS Screen 0.2 U/mL   Normal     <0.7       [facility]  
   
                                        Comment on above:   Result Comment:  
Reference Range:  
<0.7 Negative  
0.7-1.0 Equivocal  
>1.0 Positive  
CURTIS Screen includes   
U1RNP,RNP70,Sm,Ro(SS-A),La(SS-B),CENP,Scl-70,Taryn-1   
   
                                                            Performed By: #### C  
DP, REJEC ####  
[address] [address]  
[phone]  
: John Ahumada MD   
   
                                                            Performed By: #### A  
ELIZABETH, AMAB ####  
[address] [address]  
[phone]  
: John Ahumada MD   
   
                                                    IgG Subclasseson 2023   
   
                      IgG subclass 1 757 mg/dL  Normal     240-1118   [facility]  
   
                                        Comment on above:   Result Comment: (NOT  
E)  
REFERENCE INTERVAL: Immunoglobulin G Subclass 1  
The total IgG (mg/dL) can be derived from the sum of the subclass  
IgG1, IgG2, IgG3, and IgG4 values. However, a confirmatory and  
more precise total IgG is available by the turbidimetric method of  
quantitation for total IgG. Refer to test Immunoglobulin G, Serum  
([id number]).  
Access complete set of age- and/or gender-specific reference  
intervals for this test in the [facility] Test Directory  
([email or web address]).   
   
                                                            Performed By: #### C  
DP, REJEC ####  
[address] [address]  
[phone]  
: John Ahumada MD   
   
                                                            Performed By: #### A  
ELIZABETH, AMAB ####  
[facility]  
[facility] [place] [address]  
[phone]  
: John Ahumada MD   
   
                      IgG subclass 2 268 mg/dL  Normal     124-549    [facility]  
   
                                        Comment on above:   Result Comment: (NOT  
E)  
REFERENCE INTERVAL: Immunoglobulin G Subclass 2  
Access complete set of age- and/or gender-specific reference  
intervals for this test in the [facility] Test Directory  
([email or web address]).   
   
                                                            Performed By: #### C  
DP, REJEC ####  
[address] [address]  
[phone]  
: John Ahumada MD   
   
                                                            Performed By: #### A  
NCACP, AMAB ####  
[address] [address]  
[phone]  
: John Ahumada MD   
   
                      IgG subclass 3 31 mg/dL   Normal          [facility]  
   
                                        Comment on above:   Result Comment: (NOT  
E)  
REFERENCE INTERVAL: Immunoglobulin G Subclass 3  
Access complete set of age- and/or gender-specific reference  
intervals for this test in the [facility] Directory  
([email or web address]).   
   
                                                            Performed By: #### C  
DP, REJEC ####  
[address] [address]  
[phone]  
: John Ahumada MD   
   
                                                            Performed By: #### A  
NCACP, AMAB ####  
[address] [address]  
[phone]  
: John Ahumada MD   
   
                      IgG subclass 4 16 mg/dL   Normal     1-123      [facility]  
   
                                        Comment on above:   Result Comment: (NOT  
E)  
REFERENCE INTERVAL: Immunoglobulin G Subclass 4  
Access complete set of age- and/or gender-specific reference  
intervals for this test in the [facility] Test Directory  
([email or web address]).  
Performed By: [facility]  
[address] [place] [address]  
: Alejandro Hernandez MD, PhD  
CLIA Number: [id number]   
   
                                                            Performed By: #### C  
DP, REJEC ####  
[address] [address]  
[phone]  
: John Ahumada MD   
   
                                                            Performed By: #### A  
NCACP, AMAB ####  
[address] [address]  
[phone]  
: John Ahumada MD   
   
                                                    MRI ABDOMEN W WO CONTRAST MR  
CPon 2023   
   
                                                    MRI ABDOMEN W WO   
CONTRAST MRCP                           EXAMINATION:  
MRI OF THE ABDOMEN   
WITH AND WITHOUT   
CONTRAST AND MRCP   
2023 5:16 pm  
TECHNIQUE:  
Multiplanar   
multisequence MRI of   
the abdomen was   
performed without and   
with  
the administration of   
intravenous contrast.   
After initial T2 axial   
and  
coronal images, thick   
slab, thin slab and 3D   
coronal MRCP sequences   
were  
obtained without the   
administration of   
intravenous contrast.   
MIP images are  
provided for review.  
COMPARISON:  
None available.  
HISTORY:  
Cholangitis.  
FINDINGS:  
Image quality   
significantly degraded   
by motion artifact.  
No definite hepatic   
steatosis. No liver   
lesion seen. Hepatic   
vasculature  
appears to be patent.  
Gallbladder contains   
stones and sludge but   
there is no acute   
inflammation.  
Left greater than   
right central   
intrahepatic biliary   
ductal dilatation. The  
extrahepatic bile duct   
measures up to 12 mm   
and tapers distally   
with no  
definite intraductal   
filling defect seen.   
Pancreatic duct is not   
dilated and  
is no pancreas   
divisum.  
Spleen, pancreas, left   
kidney, and adrenals   
are unremarkable. At   
the upper  
pole of the right   
kidney, there is a   
complicated cystic   
mass containing  
multiple septations   
which measures 6.6 x   
6.4 x 6.0 cm without   
definite  
internal enhancement.  
No ascites or   
significant   
lymphadenopathy.   
Visualized osseous   
structures and  
gastrointestinal tract   
are unremarkable.   
Abdominal aorta is   
normal caliber.  
IMPRESSION:  
1. Cholelithiasis   
without definite acute   
cholecystitis.  
2. Extrahepatic   
biliary ductal   
dilatation measuring   
up to 12 mm without  
definite   
choledocholithiasis.  
3. At the upper pole   
of the right kidney,   
there is a 6.6 cm   
complicated  
cystic mass without   
definite enhancement   
compatible with a   
Bosniak 2F lesion.  
This can be   
re-evaluated on 6   
month follow-up.   
Suggest urology   
consultation.  
Interpreted by:  
Cecil Garcia MD  
Signed by:  
Cecil Garcia MD  
23  
Final result        Normal                                  [facility]  
   
                                                    RAD - MISCon 2023   
   
                                        RAD - MISC          [phone]  
04  
[id number]  
TIFF                Normal                                  [facility]  
   
                                                    XR ABDOMEN (KUB) (SINGLE AP   
VIEW)on 2023   
   
                                                    XR ABDOMEN (KUB)   
(SINGLE AP VIEW)                        EXAMINATION:  
ONE SUPINE XRAY   
VIEW(S) OF THE ABDOMEN  
2023 11:12 am  
COMPARISON:  
None  
HISTORY:  
ORDERING SYSTEM   
PROVIDED HISTORY: MRI   
clearance -  shunt  
TECHNOLOGIST PROVIDED   
HISTORY:  
MRI clearance -    
shunt  
Reason for Exam: MRI   
clearance  
FINDINGS:  
 shunt catheter   
projects curving in   
the right upper   
quadrant and  
terminating in the   
left lower quadrant   
near the left pelvic   
superior brim.  
No catheter   
discontinuity seen. No   
abnormal   
calcifications seen.   
Mild  
spondylosis of the   
lower lumbar spine.   
Nonobstructive bowel   
gas pattern.  
IMPRESSION:  
 shunt catheter   
terminating in the   
left lower quadrant.  
Interpreted by:  
Alondra Agustin DO  
Signed by:  
Alondra Agustin DO  
23  
Final result        Normal                                  [facility]  
   
                                                    XR CHEST (2 VW)on 2023  
   
   
                                        XR CHEST (2 VW)     EXAMINATION:  
TWO XRAY VIEWS OF THE   
CHEST  
2023 11:12 am  
COMPARISON:  
None.  
HISTORY:  
ORDERING SYSTEM   
PROVIDED HISTORY: MRI   
clearance -  shunt  
TECHNOLOGIST PROVIDED   
HISTORY:  
MRI clearance -    
shunt  
Reason for Exam: MRI   
clearance  
FINDINGS:  
 shunt catheter   
projects over the   
anterior right medial   
chest. It projects  
into the right upper   
quadrant. No catheter   
discontinuity seen. No   
pleural  
effusion, pneumothorax   
or focal consolidation   
in the chest. The   
heart is not  
enlarged. Old   
right-sided rib   
fractures.  
IMPRESSION:  
Unremarkable  shunt   
catheter coursing   
along the anterior   
right medial chest.  
Interpreted by:  
Alondra Agustin DO  
Signed by:  
Alondra Agustin DO  
23  
Final result        Normal                                  [facility]  
   
                                                    XR SKULL (<4 VIEWS)on 2023   
   
                                        XR SKULL (<4 VIEWS) EXAMINATION:  
THREE XRAY VIEWS OF   
THE SKULL  
2023 11:12 am  
COMPARISON:  
2023  
HISTORY:  
ORDERING SYSTEM   
PROVIDED HISTORY:   
verify shunt setting,   
please do  
perpendicular to shunt   
valve  
TECHNOLOGIST PROVIDED   
HISTORY:  
verify shunt setting,   
please do   
perpendicular to shunt   
valve  
Reason for Exam:   
Perpendicular to   
shunt, verify type of   
shunt for MRI  
FINDINGS:  
Grossly stable   
position of a right   
parietal  shunt. No   
evident kinks or  
discontinuities in the   
visualized tubing. No   
acute fracture.  
IMPRESSION:  
Grossly stable   
position of a right   
parietal  shunt.  
Interpreted by:  
Inderjit Dowd MD  
Signed by:  
Inderjit Dowd MD  
23  
Final result        Normal                                  [facility]  
   
                                                    Comp Metabolic Pr/rfx MGon 1  
2023   
   
                                                    AST [Catalytic   
activity/Vol]   74 U/L          High            <40             [facility]  
   
                                        Comment on above:   Performed By: #### C  
MPX ####  
[facility]  
[address] [place] [address]  
[phone]  
: John Ahumada MD   
   
                                                    Creatinine   
[Mass/Vol]      0.6 mg/dL       Low             0.7-1.2         [facility]  
   
                                        Comment on above:   Result Comment: ICTE  
[facility]   
   
                                                            Performed By: #### C  
MPX ####  
[facility]  
[address] [place] [address]  
[phone]  
: John Ahumada MD   
   
                                                    GFR/1.73 sq   
M.predicted among   
non-blacks MDRD   
(S/P/Bld) [Vol   
rate/Area]      mL/min/{1.73_m2} Normal          >60             [facility]  
   
                                        Comment on above:   Result Comment:  
These results are not intended for use in patients <18 years of   
age.  
eGFR results are calculated without a race factor using the    
CKD-EPI  
equation.  
Careful clinical correlation is recommended, particularly when   
comparing to  
results calculated using previous equations.  
The CKD-EPI equation is less accurate in patients with extremes of   
muscle mass,  
extra-renal metabolism of creatine, excessive creatine ingestion,   
or following  
therapy that affects renal tubular secretion.   
   
                                                            Performed By: #### C  
MPX ####  
[facility]  
[address] [place] [address]  
[phone]  
: John Ahumada MD   
   
                      Albumin [Mass/Vol] 3.3 g/dL   Low        3.5-5.2    [facility]  
   
                                        Comment on above:   Performed By: #### C  
MPX ####  
[facility]  
[address] [place] [address]  
[phone]  
: John Ahumada MD   
   
                      Albumin/Glob Ratio 1.1        Normal     1.0-2.5    [facility]  
   
                                        Comment on above:   Performed By: #### C  
MPX ####  
[facility]  
[address] [place] [address]  
[phone]  
: John Ahumada MD   
   
                      Alkaline Phos 362 U/L    High            [facility]  
   
                                        Comment on above:   Performed By: #### C  
MPX ####  
[address] [address]  
[phone]  
: John Ahumada MD   
   
                                                    ALT [Catalytic   
activity/Vol]   96 U/L          High            5-41            [facility]  
   
                                        Comment on above:   Performed By: #### C  
MPX ####  
[address] [address]  
[phone]  
: John Ahumada MD   
   
                                                    Anion gap   
[Moles/Vol]     12 mmol/L       Normal          9-17            [facility]  
   
                                        Comment on above:   Performed By: #### C  
MPX ####  
[address] [address]  
[phone]  
: John Ahumada MD   
   
                                                    Bilirubin   
[Mass/Vol]      6.1 mg/dL       High            0.3-1.2         [facility]  
   
                                        Comment on above:   Performed By: #### C  
MPX ####  
[address] [address]  
[phone]  
: John Ahumada MD   
   
                      Calcium [Mass/Vol] 8.8 mg/dL  Normal     8.6-10.4   [facility]  
   
                                        Comment on above:   Performed By: #### C  
MPX ####  
[address] [address]  
[phone]  
: John Ahumada MD   
   
                                                    Chloride   
[Moles/Vol]     100 mmol/L      Normal                    [facility]  
   
                                        Comment on above:   Performed By: #### C  
MPX ####  
[address] [address]  
[phone]  
: John Ahumada MD   
   
                      CO2 [Moles/Vol] 22 mmol/L  Normal     20-31      [facility]  
   
                                        Comment on above:   Performed By: #### C  
MPX ####  
[address] [address]  
[phone]  
: John Ahumada MD   
   
                      Glucose [Mass/Vol] 83 mg/dL   Normal     70-99      [facility]  
   
                                        Comment on above:   Performed By: #### C  
MPX ####  
[address] [address]  
[phone]  
: John Ahumada MD   
   
                                                    Potassium   
[Moles/Vol]     4.0 mmol/L      Normal          3.7-5.3         [facility]  
   
                                        Comment on above:   Performed By: #### C  
MPX ####  
[address] [address]  
[phone]  
: John Ahumada MD   
   
                      Protein [Mass/Vol] 6.4 g/dL   Normal     6.4-8.3    [facility]  
   
                                        Comment on above:   Performed By: #### C  
MPX ####  
[address] [address]  
[phone]  
: John Ahumada MD   
   
                      Sodium [Moles/Vol] 134 mmol/L Low        135-144    [facility]  
   
                                        Comment on above:   Performed By: #### C  
MPX ####  
[address] [address]  
[phone]  
: John Ahumada MD   
   
                                                    Urea nitrogen   
[Mass/Vol]      9 mg/dL         Normal          8-23            [facility]  
   
                                        Comment on above:   Performed By: #### C  
MPX ####  
[address] [address]  
[phone]  
: John Ahumada MD   
   
                                                    Smooth Muscle Abon   
3   
   
                      Smooth Muscle Ab 6 Units    Normal     0-19       [facility]  
   
                                        Comment on above:   Result Comment: (NOT  
E)  
If F-Actin (Smooth Muscle) Antibody, IgG is negative, the Smooth  
Muscle Antibody titer by IFA is not performed.  
REFERENCE INTERVAL: F-Actin (Smooth Muscle) Antibody, IgG by  
ARIEL  
19 Units or less ....... Negative  
20 - 30 Units .......... Weak Positive-Suggest repeat  
testing in two to three weeks  
with fresh specimen.  
31 Units or greater..... Positive-Suggestive of  
autoimmune hepatitis type 1  
or chronic active hepatitis.  
F-actin IgG antibodies have been shown to have increased  
sensitivity for autoimmune hepatitis (AIH) but lower specificity  
than smooth muscle antibodies (SMA). F-actin IgG antibodies can  
also be seen in SMA-negative disease controls (non-AIH),  
especially in patients with primary biliary cirrhosis and chronic  
hepatitis C infections. Some patients with AIH may be SMA-positive  
but negative for F-actin IgG. Consider testing for SMA by IFA if  
suspicion for AIH is strong.  
Performed By: [facility]  
[address] [place] [address]  
: Alejandro Hernandez MD, PhD  
CLIA Number: [id number]   
   
                                                            Performed By: #### C  
DP, REJEC ####  
[address] [id number] [phone]  
: John Ahumada MD   
   
                                                            Performed By: #### A  
NCACP, AMAB ####  
[address] [id number] [phone]  
: John Ahumada MD   
   
                                                    XR SKULL (<4 VIEWS)on 2023   
   
                                        XR SKULL (<4 VIEWS) EXAMINATION:  
THREE XRAY VIEWS OF   
THE SKULL  
2023 9:40 am  
COMPARISON:  
None.  
HISTORY:  
ORDERING SYSTEM   
PROVIDED HISTORY:   
clear  shunt for MRI  
TECHNOLOGIST PROVIDED   
HISTORY:  
clear  shunt for MRI  
FINDINGS:  
There is an intact   
shunt tube from a   
right parietal   
approach. There is no  
acute osseous   
abnormality. The   
surrounding soft   
tissues are   
unremarkable.  
IMPRESSION:  
Shunt tube from a   
right parietal   
approach that is   
intact. Correlate with  
expected documented   
information in regards   
to make/model of shunt   
tube to  
ensure compatibility.  
Interpreted by:  
Jarred Henson MD  
Signed by:  
Jarred Henson MD  
23  
Final result        Normal                                  [facility]  
   
                                                    CBC with Diffon 2023   
   
                      Abs. Basophil 0.07 k/uL  Normal     0.00-0.20  [facility]  
   
                                        Comment on above:   Performed By: #### C  
MPX ####  
[address] [address]  
[phone]  
: John Ahumada MD   
   
                      Abs.Imm.Granulocyte 0.08 k/uL  Normal     0.00-0.30  [facility]  
   
                                        Comment on above:   Performed By: #### C  
MPX ####  
[address] [address]  
[phone]  
: John Ahumada MD   
   
                                                    Abs.Neutrophil   
(Seg)           4.34 k/uL       Normal          1.50-8.10       [facility]  
   
                                        Comment on above:   Performed By: #### C  
MPX ####  
[address] [address]  
[phone]  
: John Ahumada MD   
   
                                                    Basophils/100 WBC   
(Bld)           1 %             Normal          0-2             [facility]  
   
                                        Comment on above:   Performed By: #### C  
MPX ####  
[address] [address]  
[phone]  
: John Ahumada MD   
   
                                                    Eosinophils (Bld)   
[#/Vol]         0.17 10*3/uL    Normal          0.00-0.44       [facility]  
   
                                        Comment on above:   Performed By: #### C  
MPX ####  
[address] [address]  
[phone]  
: John Ahumada MD   
   
                                                    Eosinophils/100 WBC   
(Bld)           3 %             Normal          1-4             [facility]  
   
                                        Comment on above:   Performed By: #### C  
MPX ####  
[address] [address]  
[phone]  
: John Ahumada MD   
   
                                                    Erythrocyte   
distribution width   
(RBC) [Ratio]   16.2 %          High            11.8-14.4       [facility]  
   
                                        Comment on above:   Performed By: #### C  
MPX ####  
[address] [address]  
[phone]  
: John Ahumada MD   
   
                                                    Hematocrit (Bld)   
[Volume fraction] 43.7 %          Normal          40.7-50.3       [facility]  
   
                                        Comment on above:   Performed By: #### C  
MPX ####  
[address] [address]  
[phone]  
: John Ahumada MD   
   
                                                    Hemoglobin (Bld)   
[Mass/Vol]      13.9 g/dL       Normal          13.0-17.0       [facility]  
   
                                        Comment on above:   Performed By: #### C  
MPX ####  
[address] [address]  
[phone]  
: John Ahumada MD   
   
                                                    Immature   
granulocytes/100   
WBC (Bld)       1 %             High            0               [facility]  
   
                                        Comment on above:   Performed By: #### C  
MPX ####  
[address] [address]  
[phone]  
: John Ahumada MD   
   
                                                    Lymphocytes (Bld)   
[#/Vol]         1.47 10*3/uL    Normal          1.10-3.70       [facility]  
   
                                        Comment on above:   Performed By: #### C  
MPX ####  
[address] [address]  
[phone]  
: John Ahumada MD   
   
                                                    Lymphocytes/100 WBC   
(Bld)           22 %            Low             24-43           [facility]  
   
                                        Comment on above:   Performed By: #### C  
MPX ####  
[address] [address]  
[phone]  
: John Ahumada MD   
   
                                                    MCH (RBC) [Entitic   
mass]           29.8 pg         Normal          25.2-33.5       [facility]  
   
                                        Comment on above:   Performed By: #### C  
MPX ####  
[address] [address]  
[phone]  
: John Ahumada MD   
   
                                                    MCHC (RBC)   
[Mass/Vol]      31.8 g/dL       Normal          28.4-34.8       [facility]  
   
                                        Comment on above:   Performed By: #### C  
MPX ####  
[address] [address]  
[phone]  
: John Ahumada MD   
   
                                                    MCV (RBC) [Entitic   
vol]            93.6 fL         Normal          82.6-102.9      [facility]  
   
                                        Comment on above:   Performed By: #### C  
MPX ####  
[address] [address]  
[phone]  
: John Ahumada MD   
   
                                                    Monocytes (Bld)   
[#/Vol]         0.59 10*3/uL    Normal          0.10-1.20       [facility]  
   
                                        Comment on above:   Performed By: #### C  
MPX ####  
[address] [address]  
[phone]  
: John Ahumada MD   
   
                                                    Monocytes/100 WBC   
(Bld)           9 %             Normal          3-12            [facility]  
   
                                        Comment on above:   Performed By: #### C  
MPX ####  
[address] [address]  
[phone]  
: John Ahumada MD   
   
                      Neutrophil (Seg) 64 %       Normal     36-65      [facility]  
   
                                        Comment on above:   Performed By: #### C  
MPX ####  
[address] [address]  
[phone]  
: John Ahumada MD   
   
                      NRBC Automated 0.0 per 100 WBC Normal     0.0        [facility]  
   
                                        Comment on above:   Performed By: #### C  
MPX ####  
[address] [address]  
[phone]  
: John Ahumada MD   
   
                                                    Platelet mean   
volume (Bld)   
[Entitic vol]   10.8 fL         Normal          8.1-13.5        [facility]  
   
                                        Comment on above:   Performed By: #### C  
MPX ####  
[address] [address]  
[phone]  
: John Ahumada MD   
   
                                                    Platelets (Bld)   
[#/Vol]         251 10*3/uL     Normal          138-453         [facility]  
   
                                        Comment on above:   Performed By: #### C  
MPX ####  
[address] [place] [address]  
[phone]  
: John Ahumada MD   
   
                      RBC (Bld) [#/Vol] 4.67 10*6/uL Normal     4.21-5.77  [facility]  
   
                                        Comment on above:   Performed By: #### C  
MPX ####  
[address] [address]  
[phone]  
: John Ahumada MD   
   
                                                    RBC morphology   
finding Nom (Bld) ANISOCYTOSIS PRESENT Normal                          [facility]  
   
                                        Comment on above:   Performed By: #### C  
MPX ####  
[address] [address]  
[phone]  
: John Ahumada MD   
   
                      WBC (Bld) [#/Vol] 6.7 10*3/uL Normal     3.5-11.3   [facility]  
   
                                        Comment on above:   Performed By: #### C  
MPX ####  
[address] [address]  
[phone]  
: John Ahumada MD   
   
                                                    Comp Metabolic Pr/rfx MGon 1  
2023   
   
                                                    Creatinine   
[Mass/Vol]      0.5 mg/dL       Low             0.7-1.2         [facility]  
   
                                        Comment on above:   Result Comment: ICTE  
[facility]   
   
                                                            Performed By: #### C  
MPX ####  
[address] [address]  
[phone]  
: John Ahumada MD   
   
                                                    GFR/1.73 sq   
M.predicted among   
non-blacks MDRD   
(S/P/Bld) [Vol   
rate/Area]      mL/min/{1.73_m2} Normal          >60             [facility]  
   
                                        Comment on above:   Result Comment:  
These results are not intended for use in patients <18 years of   
age.  
eGFR results are calculated without a race factor using the    
CKD-EPI  
equation.  
Careful clinical correlation is recommended, particularly when   
comparing to  
results calculated using previous equations.  
The CKD-EPI equation is less accurate in patients with extremes of   
muscle mass,  
extra-renal metabolism of creatine, excessive creatine ingestion,   
or following  
therapy that affects renal tubular secretion.   
   
                                                            Performed By: #### C  
MPX ####  
[address] [address]  
[phone]  
: John Ahumada MD   
   
                      Albumin [Mass/Vol] 3.1 g/dL   Low        3.5-5.2    [facility]  
   
                                        Comment on above:   Performed By: #### C  
MPX ####  
[address] [address]  
[phone]  
: John Ahumada MD   
   
                      Albumin/Glob Ratio 1.1        Normal     1.0-2.5    [facility]  
   
                                        Comment on above:   Performed By: #### C  
MPX ####  
[address] [address]  
[phone]  
: John Ahumada MD   
   
                      Alkaline Phos 362 U/L    High            [facility]  
   
                                        Comment on above:   Performed By: #### C  
MPX ####  
[address] [address]  
[phone]  
: John Ahumada MD   
   
                                                    ALT [Catalytic   
activity/Vol]   98 U/L          High            5-41            [facility]  
   
                                        Comment on above:   Performed By: #### C  
MPX ####  
[address] [address]  
[phone]  
: John Ahumada MD   
   
                                                    Anion gap   
[Moles/Vol]     12 mmol/L       Normal          9-17            [facility]  
   
                                        Comment on above:   Performed By: #### C  
MPX ####  
[address] [address]  
[phone]  
: John Ahumada MD   
   
                                                    AST [Catalytic   
activity/Vol]   80 U/L          High            <40             [facility]  
   
                                        Comment on above:   Performed By: #### C  
MPX ####  
[address] [place]  
[place] [address]  
[phone]  
: John Ahumada MD   
   
                                                    Bilirubin   
[Mass/Vol]      6.8 mg/dL       High            0.3-1.2         [facility]  
   
                                        Comment on above:   Performed By: #### C  
MPX ####  
[address] [address]  
[phone]  
: John Ahumada MD   
   
                      Calcium [Mass/Vol] 8.9 mg/dL  Normal     8.6-10.4   [facility]  
   
                                        Comment on above:   Performed By: #### C  
MPX ####  
[address] [address]  
[phone]  
: John Ahumada MD   
   
                                                    Chloride   
[Moles/Vol]     102 mmol/L      Normal                    [facility]  
   
                                        Comment on above:   Performed By: #### C  
MPX ####  
[address] [address]  
[phone]  
: John Ahumada MD   
   
                      CO2 [Moles/Vol] 20 mmol/L  Normal     20-31      [facility]  
   
                                        Comment on above:   Performed By: #### C  
MPX ####  
[address] [address]  
[phone]  
: John Ahumada MD   
   
                      Glucose [Mass/Vol] 85 mg/dL   Normal     70-99      [facility]  
   
                                        Comment on above:   Performed By: #### C  
MPX ####  
[address] [address]  
[phone]  
: John Ahumada MD   
   
                                                    Potassium   
[Moles/Vol]     3.6 mmol/L      Low             3.7-5.3         [facility]  
   
                                        Comment on above:   Performed By: #### C  
MPX ####  
[address] [address]  
[phone]  
: John Ahumada MD   
   
                      Protein [Mass/Vol] 6.0 g/dL   Low        6.4-8.3    [facility]  
   
                                        Comment on above:   Performed By: #### C  
MPX ####  
[address] [address]  
[phone]  
: John Ahuamda MD   
   
                      Sodium [Moles/Vol] 134 mmol/L Low        135-144    [facility]  
   
                                        Comment on above:   Performed By: #### C  
MPX ####  
[address] [address]  
[phone]  
: John Ahumada MD   
   
                                                    Urea nitrogen   
[Mass/Vol]      9 mg/dL         Normal          8-23            [facility]  
   
                                        Comment on above:   Performed By: #### C  
MPX ####  
[address] [address]  
[phone]  
: John Ahumada MD   
   
                                                    Ammoniaon 2023   
   
                                                    Ammonia (P)   
[Moles/Vol]     27 umol/L       Normal          16-60           [facility]  
   
                                        Comment on above:   Performed By: #### B  
C ####  
[address] [address]  
[phone]  
: John Ahumada MD   
   
                                                            Performed By: #### C  
DP, REJEC ####  
[address] [address]  
[phone]  
: John Ahumada MD   
   
                                                    Basic Metabolic Profon    
   
                                                    Creatinine   
[Mass/Vol]      0.6 mg/dL       Low             0.7-1.2         [facility]  
   
                                        Comment on above:   Result Comment: ICTE  
[facility]   
   
                                                            Performed By: #### B  
C ####  
[address] [address]  
[phone]  
: John Ahumada MD   
   
                                                    GFR/1.73 sq   
M.predicted among   
non-blacks MDRD   
(S/P/Bld) [Vol   
rate/Area]      mL/min/{1.73_m2} Normal          >60             [facility]  
   
                                        Comment on above:   Result Comment:  
These results are not intended for use in patients <18 years of   
age.  
eGFR results are calculated without a race factor using the    
CKD-EPI  
equation.  
Careful clinical correlation is recommended, particularly when   
comparing to  
results calculated using previous equations.  
The CKD-EPI equation is less accurate in patients with extremes of   
muscle mass,  
extra-renal metabolism of creatine, excessive creatine ingestion,   
or following  
therapy that affects renal tubular secretion.   
   
                                                            Performed By: #### B  
C ####  
[address] [address]  
[phone]  
: John Ahumada MD   
   
                                                    Anion gap   
[Moles/Vol]     10 mmol/L       Normal          9-17            [facility]  
   
                                        Comment on above:   Performed By: #### B  
C ####  
[address] [address]  
[phone]  
: John Ahumada MD   
   
                      Calcium [Mass/Vol] 8.7 mg/dL  Normal     8.6-10.4   [facility]  
   
                                        Comment on above:   Performed By: #### B  
C ####  
[address] [address]  
[phone]  
: John Ahumada MD   
   
                                                    Chloride   
[Moles/Vol]     102 mmol/L      Normal                    [facility]  
   
                                        Comment on above:   Performed By: #### B  
C ####  
[address] [address]  
[phone]  
: John Ahumada MD   
   
                      CO2 [Moles/Vol] 24 mmol/L  Normal     20-31      [facility]  
   
                                        Comment on above:   Performed By: #### B  
C ####  
[facility]  
[address] [place] [address]  
[phone]  
: John Ahumada MD   
   
                      Glucose [Mass/Vol] 82 mg/dL   Normal     70-99      [facility]  
   
                                        Comment on above:   Performed By: #### B  
C ####  
[address] [address]  
[phone]  
: John Ahumada MD   
   
                                                    Potassium   
[Moles/Vol]     3.7 mmol/L      Normal          3.7-5.3         [facility]  
   
                                        Comment on above:   Performed By: #### B  
C ####  
[address] [address]  
[phone]  
: John Ahumada MD   
   
                      Sodium [Moles/Vol] 136 mmol/L Normal     135-144    [facility]  
   
                                        Comment on above:   Performed By: #### B  
C ####  
[address] [address]  
[phone]  
: John Ahumada MD   
   
                                                    Urea nitrogen   
[Mass/Vol]      9 mg/dL         Normal          8-23            [facility]  
   
                                        Comment on above:   Performed By: #### B  
C ####  
[address] [address]  
[phone]  
: John Ahumada MD   
   
                                                    CA 19-9on 2023   
   
                      CA 19-9    650 U/mL   High       0-35       [facility]  
   
                                        Comment on above:   Result Comment: The   
Roche  ECLIA  assay is used. Results   
obtained   
with different assay methods  
cannot be used interchangeably.   
   
                                                            Performed By: #### C  
DP, REJEC ####  
[address] [address]  
[phone]  
: John Ahumada MD   
   
                                                            Performed By: #### A  
NCACP, AMAB ####  
[address] [address]  
[phone]  
: John Ahumada MD   
   
                                                    CBC with Diffon 2023   
   
                      Abs. Basophil 0.06 k/uL  Normal     0.00-0.20  [facility]  
   
                                        Comment on above:   Performed By: #### B  
C ####  
[address] [address]  
[phone]  
: John Ahumada MD   
   
                      Abs.Imm.Granulocyte 0.07 k/uL  Normal     0.00-0.30  [facility]  
   
                                        Comment on above:   Performed By: #### B  
C ####  
[address] [address]  
[phone]  
: John Ahumada MD   
   
                                                    Abs.Neutrophil   
(Seg)           3.78 k/uL       Normal          1.50-8.10       [facility]  
   
                                        Comment on above:   Performed By: #### B  
C ####  
[address] [address]  
[phone]  
: John Ahumada MD   
   
                                                    Basophils/100 WBC   
(Bld)           1 %             Normal          0-2             [facility]  
   
                                        Comment on above:   Performed By: #### B  
C ####  
[address] [address]  
[phone]  
: John Ahumada MD   
   
                                                    Eosinophils (Bld)   
[#/Vol]         0.21 10*3/uL    Normal          0.00-0.44       [facility]  
   
                                        Comment on above:   Performed By: #### B  
C ####  
[place] [address]  
[phone]  
: John Ahumada MD   
   
                                                    Eosinophils/100 WBC   
(Bld)           3 %             Normal          1-4             [facility]  
   
                                        Comment on above:   Performed By: #### B  
C ####  
[address] [address]  
[phone]  
: John Ahumada MD   
   
                                                    Erythrocyte   
distribution width   
(RBC) [Ratio]   17.1 %          High            11.8-14.4       [facility]  
   
                                        Comment on above:   Performed By: #### B  
C ####  
[address] [address]  
[phone]  
: John Ahumada MD   
   
                                                    Hematocrit (Bld)   
[Volume fraction] 42.7 %          Normal          40.7-50.3       [facility]  
   
                                        Comment on above:   Performed By: #### B  
C ####  
[address] [address]  
[phone]  
: John Ahumada MD   
   
                                                    Hemoglobin (Bld)   
[Mass/Vol]      14.2 g/dL       Normal          13.0-17.0       [facility]  
   
                                        Comment on above:   Performed By: #### B  
C ####  
[address] [address]  
[phone]  
: John Ahumada MD   
   
                                                    Immature   
granulocytes/100   
WBC (Bld)       1 %             High            0               [facility]  
   
                                        Comment on above:   Performed By: #### B  
C ####  
[address] [address]  
[phone]  
: John Ahumada MD   
   
                                                    Lymphocytes (Bld)   
[#/Vol]         1.58 10*3/uL    Normal          1.10-3.70       [facility]  
   
                                        Comment on above:   Performed By: #### B  
C ####  
[address] [address]  
[phone]  
: John Ahumada MD   
   
                                                    Lymphocytes/100 WBC   
(Bld)           25 %            Normal          24-43           [facility]  
   
                                        Comment on above:   Performed By: #### B  
C ####  
[address] [address]  
[phone]  
: John Ahumada MD   
   
                                                    MCH (RBC) [Entitic   
mass]           30.0 pg         Normal          25.2-33.5       [facility]  
   
                                        Comment on above:   Performed By: #### B  
C ####  
[address] [address]  
[phone]  
: John Ahumada MD   
   
                                                    MCHC (RBC)   
[Mass/Vol]      33.3 g/dL       Normal          28.4-34.8       [facility]  
   
                                        Comment on above:   Performed By: #### B  
C ####  
[address] [address]  
[phone]  
: John Ahumada MD   
   
                                                    MCV (RBC) [Entitic   
vol]            90.1 fL         Normal          82.6-102.9      [facility]  
   
                                        Comment on above:   Performed By: #### B  
C ####  
[place] [id number]  
[phone]  
: John Ahumada MD   
   
                                                    Monocytes (Bld)   
[#/Vol]         0.54 10*3/uL    Normal          0.10-1.20       [facility]  
   
                                        Comment on above:   Performed By: #### B  
C ####  
[address] [address]  
[phone]  
: John Ahumada MD   
   
                                                    Monocytes/100 WBC   
(Bld)           9 %             Normal          3-12            [facility]  
   
                                        Comment on above:   Performed By: #### B  
C ####  
[address] [address]  
[phone]  
: John Ahumada MD   
   
                      Neutrophil (Seg) 61 %       Normal     36-65      [facility]  
   
                                        Comment on above:   Performed By: #### B  
C ####  
[address] [address]  
[phone]  
: John Ahumada MD   
   
                      NRBC Automated 0.0 per 100 WBC Normal     0.0        [facility]  
   
                                        Comment on above:   Performed By: #### B  
C ####  
[address] [address]  
[phone]  
: John Ahumada MD   
   
                                                    Platelet mean   
volume (Bld)   
[Entitic vol]   11.5 fL         Normal          8.1-13.5        [facility]  
   
                                        Comment on above:   Performed By: #### B  
C ####  
[address] [address]  
[phone]  
: John Ahumada MD   
   
                                                    Platelets (Bld)   
[#/Vol]         254 10*3/uL     Normal          138-453         [facility]  
   
                                        Comment on above:   Performed By: #### B  
C ####  
[address] [address]  
[phone]  
: John Ahumada MD   
   
                      RBC (Bld) [#/Vol] 4.74 10*6/uL Normal     4.21-5.77  [facility]  
   
                                        Comment on above:   Performed By: #### B  
C ####  
[address] [address]  
[phone]  
: John Ahumada MD   
   
                                                    RBC morphology   
finding Nom (Bld) ANISOCYTOSIS PRESENT Normal                          [facility]  
   
                                        Comment on above:   Performed By: #### B  
C ####  
[address] [address]  
[phone]  
: John Ahumada MD   
   
                      WBC (Bld) [#/Vol] 6.2 10*3/uL Normal     3.5-11.3   [facility]  
   
                                        Comment on above:   Performed By: #### B  
C ####  
[address] [address]  
[phone]  
: John Ahumada MD   
   
                                                    Calcium, Ionicon 2023   
   
                      Calcium [Moles/Vol] 1.18 mmol/L Normal     1.13-1.33  [facility]  
   
                                        Comment on above:   Performed By: #### B  
C ####  
[address] [address]  
[phone]  
: John Ahumada MD   
   
                                                            Performed By: #### C  
DP, REJEC ####  
[address] [address]  
[phone]  
: John Ahumada MD   
   
                                                    Carcinoembry. Antig.on    
   
                                                    Carcinoembry.   
Antig.          1.6 ng/mL       Normal          <3.9            [facility]  
   
                                        Comment on above:   Result Comment: The   
Roche  ECLIA  assay is used. Results   
obtained   
with different assay methods  
cannot be used interchangeably.   
   
                                                            Performed By: #### C  
DP, REJEC ####  
[address] [address]  
[phone]  
: John Ahumada MD   
   
                                                            Performed By: #### A  
NCACP, AMAB ####  
[address] [address]  
[phone]  
: John Ahumada MD   
   
                                                    Extra Yellow Tubeon 20  
23   
   
                      Extra Yellow Tube            Normal                [facility]  
   
                                        Comment on above:   Performed By: #### B  
C ####  
[address] [address]  
[phone]  
: John Ahumada MD   
   
                                                            Performed By: #### C  
DP, REJEC ####  
[address] [address]  
[phone]  
: John Ahumada MD   
   
                                                    IgMon 2023   
   
                      IgM [Mass/Vol] 82 mg/dL   Normal          [facility]  
   
                                        Comment on above:   Performed By: #### C  
DP, REJEC ####  
[address] [address]  
[phone]  
: John Ahumada MD   
   
                                                            Performed By: #### A  
NCACP, AMAB ####  
[address] [address]  
[phone]  
: John Ahumada MD   
   
                                                    Liver Profileon 2023   
   
                      Albumin [Mass/Vol] 3.3 g/dL   Low        3.5-5.2    [facility]  
   
                                        Comment on above:   Performed By: #### B  
C ####  
[address] [address]  
[phone]  
: John Ahumada MD   
   
                      Albumin/Glob Ratio 1.2        Normal     1.0-2.5    [facility]  
   
                                        Comment on above:   Performed By: #### B  
C ####  
[address] [address]  
[phone]  
: John Ahumada MD   
   
                      Alkaline Phos 363 U/L    High            [facility]  
   
                                        Comment on above:   Performed By: #### B  
C ####  
[address] [address]  
[phone]  
: John Ahumada MD   
   
                                                    ALT [Catalytic   
activity/Vol]   100 U/L         High            5-41            [facility]  
   
                                        Comment on above:   Performed By: #### B  
C ####  
[address] [address]  
[phone]  
: John Ahumada MD   
   
                                                    AST [Catalytic   
activity/Vol]   70 U/L          High            <40             [facility]  
   
                                        Comment on above:   Performed By: #### B  
C ####  
[address] [address]  
[phone]  
: John Ahumada MD   
   
                                                    Bilirubin   
[Mass/Vol]      7.5 mg/dL       High            0.3-1.2         [facility]  
   
                                        Comment on above:   Performed By: #### B  
C ####  
[address] [address]  
[phone]  
: John Ahumada MD   
   
                      Bilirubin, Indirect 2.6 mg/dL  High       0.0-1.0    [facility]  
   
                                        Comment on above:   Performed By: #### B  
C ####  
[address] [address]  
[phone]  
: John Ahumada MD   
   
                                                    Bilirubin.indirect   
[Mass/Vol]      4.9 mg/dL       High            <0.3            [facility]  
   
                                        Comment on above:   Performed By: #### B  
C ####  
[address] [address]  
[phone]  
: John Ahumada MD   
   
                      Protein [Mass/Vol] 6.0 g/dL   Low        6.4-8.3    [facility]  
   
                                        Comment on above:   Performed By: #### B  
C ####  
[address] [address]  
[phone]  
: John Ahumada MD   
   
                                                    RA Screenon 2023   
   
                      RA Screen  <10        Normal     <14        [facility]  
   
                                        Comment on above:   Performed By: #### C  
DP, REJEC ####  
[address] [address]  
[phone]  
: John Ahumada MD   
   
                                                            Performed By: #### A  
NCACP, AMAB ####  
[facility]  
[address] [place] [id number] [phone]  
: John Ahumada MD   
   
                                                    Calcium, Ionicon 2023   
   
                      Calcium [Moles/Vol] 1.12 mmol/L Low        1.13-1.33  [facility]  
   
                                        Comment on above:   Performed By: #### I  
OCAL, LIP, MG, CHARANJIT, TRIG ####  
[facility] [facility]  
[address] [place] [address]  
[phone]  
: John Ahumada MD   
   
                                                            Performed By: #### P  
HO, LIP, IOCAL, MG, TRIG ####  
[facility]  
[address] [place] [address]  
[phone]  
: John Ahumada MD   
   
                                                    Comp Metab w/Bili Pron 6   
   
                                                    Creatinine   
[Mass/Vol]      0.6 mg/dL       Low             0.7-1.2         [facility]  
   
                                        Comment on above:   Result Comment: ICTE  
[place] SPECIMEN   
   
                                                            Performed By: #### C  
MPX ####  
[facility]  
[address] [place] [address]  
[phone]  
: John Ahumada MD   
   
                                                            Performed By: #### B  
C ####  
[facility]  
[address] [place] [address]  
[phone]  
: John Ahumada MD   
   
                                                    GFR/1.73 sq   
M.predicted among   
non-blacks MDRD   
(S/P/Bld) [Vol   
rate/Area]      mL/min/{1.73_m2} Normal          >60             [facility]  
   
                                        Comment on above:   Result Comment:  
These results are not intended for use in patients <18 years of   
age.  
eGFR results are calculated without a race factor using the    
CKD-EPI  
equation.  
Careful clinical correlation is recommended, particularly when   
comparing to  
results calculated using previous equations.  
The CKD-EPI equation is less accurate in patients with extremes of   
muscle mass,  
extra-renal metabolism of creatine, excessive creatine ingestion,   
or following  
therapy that affects renal tubular secretion.   
   
                                                            Performed By: #### C  
MPX ####  
[address] [address]  
[phone]  
: John Ahumada MD   
   
                                                            Performed By: #### B  
C ####  
[address] [address]  
[phone]  
: John Ahumada MD   
   
                      Albumin [Mass/Vol] 3.0 g/dL   Low        3.5-5.2    [facility]  
   
                                        Comment on above:   Performed By: #### C  
MPX ####  
[address] [address]  
[phone]  
: John Ahumada MD   
   
                                                            Performed By: #### B  
C ####  
[address] [address]  
[phone]  
: John Ahumada MD   
   
                      Albumin/Glob Ratio 1.0        Normal     1.0-2.5    [facility]  
   
                                        Comment on above:   Performed By: #### C  
MPX ####  
[address] [address]  
[phone]  
: John Ahumada MD   
   
                                                            Performed By: #### B  
C ####  
[address] [address]  
[phone]  
: John Ahumada MD   
   
                      Alkaline Phos 358 U/L    High            [facility]  
   
                                        Comment on above:   Performed By: #### C  
MPX ####  
[address] [address]  
[phone]  
: John Ahumada MD   
   
                                                            Performed By: #### B  
C ####  
[address] [address]  
[phone]  
: John Ahumada MD   
   
                                                    ALT [Catalytic   
activity/Vol]   107 U/L         High            5-41            [facility]  
   
                                        Comment on above:   Performed By: #### C  
MPX ####  
[address] [address]  
[phone]  
: John Ahumada MD   
   
                                                            Performed By: #### B  
C ####  
[address] [address]  
[phone]  
: John Ahumada MD   
   
                                                    Anion gap   
[Moles/Vol]     10 mmol/L       Normal          9-17            [facility]  
   
                                        Comment on above:   Performed By: #### C  
MPX ####  
[address] [address]  
[phone]  
: John Ahumada MD   
   
                                                            Performed By: #### B  
C ####  
[address] [address]  
[phone]  
: John Ahumada MD   
   
                                                    AST [Catalytic   
activity/Vol]   76 U/L          High            <40             [facility]  
   
                                        Comment on above:   Performed By: #### C  
MPX ####  
[address] [address]  
[phone]  
: John Ahumada MD   
   
                                                            Performed By: #### B  
C ####  
[address] [address]  
[phone]  
: John Ahumada MD   
   
                                                    Bilirubin   
[Mass/Vol]      8.5 mg/dL       High            0.3-1.2         [facility]  
   
                                        Comment on above:   Performed By: #### C  
MPX ####  
[address] [address]  
[phone]  
: John Ahumada MD   
   
                                                            Performed By: #### B  
C ####  
[address] [address]  
[phone]  
: John Ahumada MD   
   
                      Bilirubin, Indirect 2.9 mg/dL  High       0.0-1.0    [facility]  
   
                                        Comment on above:   Performed By: #### C  
MPX ####  
[address] [address]  
[phone]  
: John Ahumada MD   
   
                                                            Performed By: #### B  
C ####  
[facility]  
[address] [place] [address]  
[phone]  
: John Ahumada MD   
   
                                                    Bilirubin.indirect   
[Mass/Vol]      5.6 mg/dL       High            <0.3            [facility]  
   
                                        Comment on above:   Performed By: #### C  
MPX ####  
[address] [address]  
[phone]  
: John Ahumada MD   
   
                                                            Performed By: #### B  
C ####  
[address] [address]  
[phone]  
: John Ahumada MD   
   
                      Calcium [Mass/Vol] 8.4 mg/dL  Low        8.6-10.4   [facility]  
   
                                        Comment on above:   Performed By: #### C  
MPX ####  
[address] [address]  
[phone]  
: John Ahumada MD   
   
                                                            Performed By: #### B  
C ####  
[address] [address]  
[phone]  
: John Ahumada MD   
   
                                                    Chloride   
[Moles/Vol]     102 mmol/L      Normal                    [facility]  
   
                                        Comment on above:   Performed By: #### C  
MPX ####  
[address] [address]  
[phone]  
: John Ahumada MD   
   
                                                            Performed By: #### B  
C ####  
[facility]  
[address] [place] [address]  
[phone]  
: John Ahumada MD   
   
                      CO2 [Moles/Vol] 22 mmol/L  Normal     20-31      [facility]  
   
                                        Comment on above:   Performed By: #### C  
MPX ####  
[address] [address]  
[phone]  
: John Ahumada MD   
   
                                                            Performed By: #### B  
C ####  
[facility]  
[address] [place] [address]  
[phone]  
: John Ahumada MD   
   
                      Glucose [Mass/Vol] 95 mg/dL   Normal     70-99      [facility]  
   
                                        Comment on above:   Performed By: #### C  
MPX ####  
[address] [address]  
[phone]  
: John Ahumada MD   
   
                                                            Performed By: #### B  
C ####  
[address] [address]  
[phone]  
: John Ahumada MD   
   
                                                    Potassium   
[Moles/Vol]     3.3 mmol/L      Low             3.7-5.3         [facility]  
   
                                        Comment on above:   Performed By: #### C  
MPX ####  
[address] [address]  
[phone]  
: John Ahumada MD   
   
                                                            Performed By: #### B  
C ####  
[address] [address]  
[phone]  
: John Ahumada MD   
   
                      Protein [Mass/Vol] 5.9 g/dL   Low        6.4-8.3    [facility]  
   
                                        Comment on above:   Performed By: #### C  
MPX ####  
[address] [address]  
[phone]  
: John Ahumada MD   
   
                                                            Performed By: #### B  
C ####  
[address] [address]  
[phone]  
: John Ahumada MD   
   
                      Sodium [Moles/Vol] 134 mmol/L Low        135-144    [facility]  
   
                                        Comment on above:   Performed By: #### C  
MPX ####  
[address] [address]  
[phone]  
: John Ahumada MD   
   
                                                            Performed By: #### B  
C ####  
[address] [address]  
[phone]  
: John Ahumada MD   
   
                                                    Urea nitrogen   
[Mass/Vol]      8 mg/dL         Normal          8-23            [facility]  
   
                                        Comment on above:   Performed By: #### C  
MPX ####  
[address] [address]  
[phone]  
: John Ahumada MD   
   
                                                            Performed By: #### B  
C ####  
[address] [address]  
[phone]  
: John Ahumada MD   
   
                                                    Lipaseon 2023   
   
                                                    Lipase [Catalytic   
activity/Vol]   48 U/L          Normal          13-60           [facility]  
   
                                        Comment on above:   Performed By: #### I  
OCAL, LIP, MG, CHARANJIT, TRIG ####  
[address] [address]  
[phone]  
: John Ahumada MD   
   
                                                            Performed By: #### P  
HO, LIP, IOCAL, MG, TRIG ####  
[address] [address]  
[phone]  
: John Ahumada MD   
   
                                                    Magnesiumon 2023   
   
                                                    Magnesium   
[Mass/Vol]      2.3 mg/dL       Normal          1.6-2.6         [facility]  
   
                                        Comment on above:   Performed By: #### I  
OCAL, LIP, MG, CHARANJIT, TRIG ####  
[address] [address]  
[phone]  
: John Ahumada MD   
   
                                                            Performed By: #### P  
HO, LIP, IOCAL, MG, TRIG ####  
[facility]  
[address] [place] [address]  
[phone]  
: John Ahumada MD   
   
                                                    PTon 2023   
   
                                                    INR Coag (PPP)   
[Relative time] 0.9 {INR}       Normal                          [facility]  
   
                                        Comment on above:   Result Comment:  
Therapeutic Range:  
Moderate Anticoagulant Intensity: INR = 2.0-3.0  
High Anticoagulant Intensity: INR = 2.5-3.5   
   
                                                            Performed By: #### C  
DP, REJEC ####  
[address]  
[place] [address]  
[phone]  
: John Ahumada MD   
   
                                                    PT Coag (PPP)   
[Time]          11.8 s          Normal          11.7-14.9       [facility]  
   
                                        Comment on above:   Performed By: #### C  
DP, REJEC ####  
[address] [address]  
[phone]  
: John Ahumada MD   
   
                                                    Phosphorus, Inorg.on   
023   
   
                      Phosphorus, Inorg. 2.6 mg/dL  Normal     2.5-4.5    [facility]  
   
                                        Comment on above:   Performed By: #### I  
OCAL, LIP, MG, CHARANJIT, TRIG ####  
[facility]  
[address] [place] [address]  
[phone]  
: John Ahumada MD   
   
                                                            Performed By: #### P  
HO, LIP, IOCAL, MG, TRIG ####  
[facility]  
[address] [place] [address]  
[phone]  
: John Ahumada MD   
   
                                                    Triglycerideson 2023   
   
                                                    Triglyceride   
[Mass/Vol]      138 mg/dL       Normal          <150            [facility]  
   
                                        Comment on above:   Result Comment:  
Triglyceride Guidelines:  
<150 Desirable  
150-199 Borderline  
200-499 High  
>499 Very high  
Based on AHA Guidelines for fasting triglyceride, 2012.   
   
                                                            Performed By: #### I  
OCAL, LIP, MG, CHARANJIT, TRIG ####  
[facility]  
[address] [place] [address]  
[phone]  
: John Ahumada MD   
   
                                                            Performed By: #### A  
NCACP, AMAB ####  
[facility]  
[address] [place] [address]  
[phone]  
: John Ahumada MD   
   
                                                    Ambulatory Visit Summaryon 1  
2023   
   
                                                    Ambulatory Visit   
Summary                                 [Image Removed]  
TAWANDA VILLARREAL  
:1955  
MRN:[id number]  
Visit Date:2023  
Ambulatory Visit   
Instructions  
Your Diagnosis  
Jaundice  
Your Care Team  
Attending Physician -  
Schwab FNP, Jodi L  
Primary Care Physician   
-  
Baldomero WAGNER, Live CHAN  
This Is Your   
Medications List  
cetirizine (cetirizine   
10 mg oral capsule)  
donepezil (donepezil   
10 mg Tab)  
memantine (memantine 5   
mg Tab)  
Procedures Performed  
Shunt ().  
What to do next  
Scheduled Follow-Up   
Appointments  
Tuesday Dec. 5, 2023   
2:40 PM EST  
Where: [facility]  
c Noteon 2023   
   
                                                    Family Medicine   
Office/Clinic Note                      HPI Staff  
Tawanda is a 67 year   
old male presenting   
for acute visit  
Pt Jaundice  
Assessment/Plan  
1. Jaundice (R17:   
Unspecified jaundice)  
pt presents for office   
visit pt is visibly   
severely jaundice. pt   
caregiver was advised   
to take him to ER. pt   
was not seen for a   
visit. just sent to ER  
Follow-up  
No qualifying data   
available  
Problem List/Past   
Medical History  
Ongoing  
Allergies  
Fatigue  
Head injury  
Jaundice  
Nocturia  
Screening for   
hyperlipidemia  
Screening for prostate   
cancer  
Sleep disorder  
Wellness examination  
Historical  
No qualifying data  
Procedure/Surgical   
History  
Shunt ().  
Medications  
cetirizine 10 mg oral   
capsule, 10 mg= 1   
cap(s), Oral, Daily,   
PRN, 2 refills  
donepezil 10 mg Tab,   
10 mg= 1 tab(s), Oral,   
Once a day (at   
bedtime), 2 refills  
memantine 5 mg Tab, 5   
mg= 1 tab(s), Oral,   
Daily, 2 refills  
Allergies  
No Known Medication   
Allergies  
Social History  
Tobacco  
Former smoker, quit   
more than 30 days ago   
Tobacco Use:.   
Cigarettes, Household   
tobacco concerns: No.,   
2023  
Immunizations  
Vaccine Date Status  
diphtheria/pertussis,   
acel/tetanus adult   
2020 Recorded  
influenza virus   
vaccine, inactivated   
2017 Recorded  
influenza virus   
vaccine, inactivated   
10/23/2015 Recorded Normal                                  [facility]  
   
                                        Comment on above:   Result Comment: Elec  
tronically Signed By: Schwab FNP, Jodi L\.br\Date and Time Signed: 23 16:13 EST   
   
                                                    Reminderson 10-   
   
                                        Reminders           --------------------  
-  
From: Schwab FNP, Jodi L  
To: [facility] - Clinical;  
Sent: 10/30/2023   
13:43:18 EDT Show up:   
10/30/2023 13:43:00   
EDT  
Subject: Ambulatory   
Reminder  
Due Date/Time:   
10/31/2023 13:42:00   
EDT  
Let Tawanda's caregiver   
know his PSA was just   
slightly elevated. I   
would like to repeat   
that in 6-8 weeks. IF   
it is still elevated   
then, I will refer to   
urology. Otherwise his   
labs are all normal  
Results:  
Date Result Name Ind   
Value Ref Range  
10/27/2023 9:13 WBC   
8.3 E9/L (4.0 - 11.0)  
10/27/2023 9:13 RBC   
5.1 E12/L (4.3 - 5.9)  
10/27/2023 9:13 HGB   
15.5 gm/dL (13.5 -   
17.5)  
10/27/2023 9:13 Hct   
45.8 % (37.7 - 49.0)  
10/27/2023 9:13 MCV   
89.6 fL (80.0 - 100.0)  
10/27/2023 9:13 MCH   
30.3 pg (27.0 - 34.0)  
10/27/2023 9:13 MCHC   
33.9 gm/dL (31.4 -   
36.0)  
10/27/2023 9:13 RDW   
((H)) 14.4 % (10.9 -   
14.2)  
10/27/2023 9:13   
Platelet 339.0 E9/L   
(150.0 - 500.0)  
10/27/2023 9:13 MPV   
8.2 fL (6.4 - 10.8)  
10/27/2023 9:13 Neutro   
Auto 66.3 % (36.0 -   
75.0)  
10/27/2023 9:13 Lymph   
Auto 21.4 % (14.0 -   
50.0)  
10/27/2023 9:13 Mono   
Auto 9.0 % (4.0 -   
14.0)  
10/27/2023 9:13 Eos   
Auto 3.0 % (0.0 - 8.0)  
10/27/2023 9:13   
Basophil Auto 0.3 %   
(0.0 - 2.0)  
10/27/2023 9:13 Neutro   
Absolute 5.5 E9/L (2.0   
- 7.5)  
10/27/2023 9:13 Lymph   
Absolute 1.8 E9/L (1.0   
- 4.0)  
10/27/2023 9:13 [place]   
Absolute 0.7 E9/L (0.2   
- 1.0)  
10/27/2023 9:13 Eos   
Absolute 0.3 E9/L (0.0   
- 0.5)  
10/27/2023 9:13   
Basophil Absolute 0.0   
E9/L (0.0 - 0.2)  
10/27/2023 9:13   
Glucose Lvl 86 mg/dL   
(55 - 199)  
10/27/2023 9:13 BUN 13   
mg/dL (5 - 21)  
10/27/2023 9:13   
Creatinine 0.9 mg/dL   
(0.5 - 1.3)  
10/27/2023 9:13 eGFR   
95 mL/min/1.73 m2   
(>=59 - )  
10/27/2023 9:13   
BUN/Creat Ratio 14 (10   
- 20)  
10/27/2023 9:13 Sodium   
Lvl 137 mmol/L (135 -   
145)  
10/27/2023 9:13   
Potassium Lvl 3.8   
mmol/L (3.5 - 5.3)  
10/27/2023 9:13   
Chloride 108 mmol/L   
(101 - 111)  
10/27/2023 9:13 CO2 22   
mmol/L (21 - 31)  
10/27/2023 9:13 AGAP   
11 mEq/L (6 - 16)  
10/27/2023 9:13   
Calcium Lvl 9.1 mg/dL   
(8.9 - 11.1)  
10/27/2023 9:13 Alk   
Phos 65 Int._Unit/L   
(21 - 98)  
10/27/2023 9:13 ALT 21   
Int._Unit/L (6 - 46)  
10/27/2023 9:13 AST 20   
Int._Unit/L (5 - 43)  
10/27/2023 9:13 Total   
Protein 7.5 gm/dL (6.0   
- 7.8)  
10/27/2023 9:13   
Albumin Lvl 3.5 gm/dL   
(3.3 - 5.0)  
10/27/2023 9:13   
Globulin 4.0 gm/dL   
(1.4 - 4.0)  
10/27/2023 9:13 A/G   
Ratio ((L)) 0.9 (1.1 -   
2.2)  
10/27/2023 9:13 Bili   
Total 0.6 mg/dL (0.0 -   
1.1)  
10/27/2023 9:13 Chol   
157 mg/dL (120 - 200)  
10/27/2023 9:13 Trig   
63 mg/dL ( - <=149)  
10/27/2023 9:13 HDL 40   
mg/dL  
10/27/2023 9:13 LDL   
Direct 99 mg/dL ( -   
<=129)  
10/27/2023 9:13 VLDL   
13 mg/dL (7 - 40)  
10/27/2023 9:13 TSH   
2.09 mcIU/mL (0.34 -   
5.60)  
10/27/2023 9:13 PSA   
Scrn Tot. ((H)) 4.1   
ng/mL (0.1 - 3.5)  
spoke with caregiver   
Emily and advised her   
PSA was elevated and   
autumn would like to re   
check the levels in   
6-8 weeks if still   
elevated will refer to   
Urology. Caregiver   
transferred to front   
desk to schedule lab   
draw.               Normal                                  [facility]  
   
                                                    Ambulatory Visit Summaryon 1  
   
   
                                                    Ambulatory Visit   
Summary                                 [Image Removed]  
TAWANDA VILLARREAL  
:1955  
MRN:[id number]  
Visit Date:10/27/2023  
Ambulatory Visit   
Instructions  
Your Diagnosis  
Wellness examination  
Screening for   
hyperlipidemia  
Screening for prostate   
cancer  
Fatigue  
Nocturia  
Allergies  
Brain damage  
Head injury  
BMI 26.0-26.9,adult  
Former smoker  
Your Care Team  
Attending Physician -  
Schwab FNP, Jodi L  
Primary Care Physician   
-  
Live Alexandre MD  
This Is Your   
Medications List  
cetirizine (cetirizine   
10 mg oral capsule)  
donepezil (donepezil   
10 mg Tab)  
memantine (memantine 5   
mg Tab)  
Procedures Performed  
Shunt (2013).  
Discharge Vitals  
Heart Rate   
(Peripheral)  
68  
Respiratory Rate  
18  
Blood Pressure  
132/88  
Height  
177 cm  
Height  
70 in  
Weight  
83.10 kg  
Weight  
182.82 lb  
BMI  
26.52  
What to do next  
You Need to Complete   
the Following  
CBC w/ Auto Diff,   
Blood, Routine   
collect, 10/27/23,   
Order for future   
visit, Lab Collect,   
Wellness examination                    Invalid   
Interpretation   
Code                                    Screening for   
prostate cancer                         [facility]  
   
                                                    Auto Diffon 10-   
   
                                                    Basophils/100 WBC   
(Bld)           0.3 %           Normal          0.0-2.0         [facility]  
   
                                        Comment on above:   Order Comment: Order  
 Added by Discern Expert.   
   
                                                            Performed By: #### 2  
[id number], [id number], [id number], [id number],   
[id number], [id number] ####[address]   
[place] [address]   
   
                                                    Basophils/Leukocyte  
s Auto (Bld) [Pure   
# fraction]     0.0 E9/L        Normal          0.0-0.2         [facility]  
   
                                        Comment on above:   Order Comment: Order  
 Added by Discern Expert.   
   
                                                            Performed By: #### 2  
[id number], [id number], [id number], [id number],   
[id number] ####[address]   
[place] [address]   
   
                                                    Eosinophils/100 WBC   
(Bld)           3.0 %           Normal          0.0-8.0         [facility]  
   
                                        Comment on above:   Order Comment: Order  
 Added by Discern Expert.   
   
                                                            Performed By: #### 2  
[id number], [id number], [id number], [id number], [id number],   
[id number], [id number] ####[address] [address]   
   
                                                    Eosinophils/Leukocy  
kenneth Auto (Bld)   
[Pure # fraction] 0.3 E9/L        Normal          0.0-0.5         [facility]  
   
                                        Comment on above:   Order Comment: Order  
 Added by Discern Expert.   
   
                                                            Performed By: #### 2  
366889, 1469597, 0263198, 6997499, 65230988,   
70582944, 5208376 ####[address]   
[place] [address]   
   
                                                    Lymphocytes/100 WBC   
(Bld)           21.4 %          Normal          14.0-50.0       [facility]  
   
                                        Comment on above:   Order Comment: Order  
 Added by Ulysses Expert.   
   
                                                            Performed By: #### 2  
850705, 0940010, 1302628, 7014922, 82660393,   
15113253, 1803434 ####[address]   
[place] [address]   
   
                                                    Lymphocytes/Leukocy  
kenneth Auto (Bld)   
[Pure # fraction] 1.8 E9/L        Normal          1.0-4.0         [facility]  
   
                                        Comment on above:   Order Comment: Order  
 Added by Discern Expert.   
   
                                                            Performed By: #### 2  
448569, 8593167, 5398529, 5985321, 79205495,   
54010887, 9812565 ####[address]   
[place] [address]   
   
                                                    Monocytes/100 WBC   
(Bld)           9.0 %           Normal          4.0-14.0        [facility]  
   
                                        Comment on above:   Order Comment: Order  
 Added by Discern Expert.   
   
                                                            Performed By: #### 2  
691838, 4004964, 0384385, 0725561, 06632075,   
26246352, 4567613 ####[address] [address]   
   
                                                    Monocytes/Leukocyte  
s Auto (Bld) [Pure   
# fraction]     0.7 E9/L        Normal          0.2-1.0         [facility]  
   
                                        Comment on above:   Order Comment: Order  
 Added by Discern Expert.   
   
                                                            Performed By: #### 2  
323300, 1762898, 9586014, 0238689, 60574610,   
68923471, 1309523 ####[address]   
[place] [address]   
   
                                                    Neutrophils/100 WBC   
(Bld)           66.3 %          Normal          36.0-75.0       [facility]  
   
                                        Comment on above:   Order Comment: Order  
 Added by Discern Expert.   
   
                                                            Performed By: #### 2  
554829, 8672162, 2342750, 3943814, 33073789,   
27762385, 7287849 ####[address]   
[place] [address]   
   
                                                    Neutrophils/Leukocy  
kenneth Auto (Bld)   
[Pure # fraction] 5.5 E9/L        Normal          2.0-7.5         [facility]  
   
                                        Comment on above:   Order Comment: Order  
 Added by Discern Expert.   
   
                                                            Performed By: #### 2  
661310, 0539762, 4933234, 6737667, 15156841,   
02265864, 3502793 ####[facility] [address]   
[place] [address]   
   
                                                    CBC w/ Auto Diffon 10-  
3   
   
                                                    Erythrocyte   
distribution width   
(RBC) [Ratio]   14.4 %          High            10.9-14.2       [facility]  
   
                                        Comment on above:   Performed By: #### 2  
105661, 9488124, 1106694, 4874842,   
19452512,   
88769091, 9116511 ####[address]   
[place] [address]   
   
                                                    Hematocrit (Bld)   
[Volume fraction] 45.8 %          Normal          37.7-49.0       [facility]  
   
                                        Comment on above:   Performed By: #### 2  
422186, 1904168, 7666532, 1625528,   
02319093,   
91920770, 3814160 ####[address] [address]   
   
                                                    Hemoglobin (Bld)   
[Mass/Vol]      15.5 g/dL       Normal          13.5-17.5       [facility]  
   
                                        Comment on above:   Performed By: #### 2  
831236, 7528786, 1962532, 9640104,   
20528136,   
23936863, 3829023 ####[address] [address]   
   
                                                    MCH (RBC) [Entitic   
mass]           30.3 pg         Normal          27.0-34.0       [facility]  
   
                                        Comment on above:   Performed By: #### 2  
340743, 7873022, 3684338, 8706903,   
05183711,   
26358819, 4388763 ####[address] [address]   
   
                                                    MCHC (RBC)   
[Mass/Vol]      33.9 g/dL       Normal          31.4-36.0       [facility]  
   
                                        Comment on above:   Performed By: #### 2  
047015, 6747881, 2338644, 3956388,   
12503000,   
87454401, 6762050 ####[address] [address]   
   
                                                    MCV (RBC) [Entitic   
vol]            89.6 fL         Normal          80.0-100.0      [facility]  
   
                                        Comment on above:   Performed By: #### 2  
959972, 7857990, 9370121, 7389814,   
81227485,   
55756760, 8935411 ####[facility] [address]   
[place] [address]   
   
                                                    Platelet mean   
volume (Bld)   
[Entitic vol]   8.2 fL          Normal          6.4-10.8        [facility]  
   
                                        Comment on above:   Performed By: #### 2  
381329, 0904605, 7720715, 8997755,   
18859642,   
60067407, 8284652 ####[facility] [address]   
[place] [address]   
   
                                                    Platelets (Bld)   
[#/Vol]         339.0 E9/L      Normal          150.0-500.0     [facility]  
   
                                        Comment on above:   Performed By: #### 2  
743748, 9142600, 8475482, 7540546,   
92171313,   
86766221, 7086609 ####[facility] [address] [place] [address]   
   
                      RBC (Bld) [#/Vol] 5.1 E12/L  Normal     4.3-5.9    [facility]  
   
                                        Comment on above:   Performed By: #### 2  
737687, 8726363, 0330550, 4273440,   
86196831,   
20659362, 5849933 ####[facility] [address]   
[place] [address]   
   
                                                    WBC corrected for   
nucl RBC Auto (Bld)   
[#/Vol]         8.3 E9/L        Normal          4.0-11.0        [facility]  
   
                                        Comment on above:   Performed By: #### 2  
629405, 8377797, 1683224, 1517547,   
51791711,   
93857008, 6591382 ####[facility] [address]   
[place] [address]   
   
                                                    CHEMISTRYOrdered By: SYSTEM   
SYSTEM on 10-   
   
                      Albumin [Mass/Vol] 3.5 g/dL   Normal     3.3 - 5.0 gm/dL F  
[facility]   
[facility]  
   
                                                    Albumin/Globulin   
[Mass ratio]    0.9 {ratio}     Low             1.1 - 2.2       [facility]   
[facility]  
   
                                                    ALP [Catalytic   
activity/Vol]       65 [iU]/d           Normal              21 - 98   
Int._Unit/L                             [facility]   
[facility]  
   
                                                    ALT No additional   
P-5'-P [Catalytic   
activity/Vol]       21 [iU]/d           Normal              6 - 46   
Int._Unit/L                             [facility]   
[facility]  
   
                                                    Anion gap   
[Moles/Vol]     11 mmol/L       Normal          6 - 16 mEq/L    [facility]   
[facility]  
   
                                                    AST [Catalytic   
activity/Vol]       20 [iU]/d           Normal              5 - 43   
Int._Unit/L                             [facility]   
[facility]  
   
                                                    Bilirubin   
[Mass/Vol]      0.6 mg/dL       Normal          0.0 - 1.1 mg/dL [facility]   
[facility]  
   
                      Calcium [Mass/Vol] 9.1 mg/dL  Normal     8.9 - 11.1 mg/dL   
[facility]   
[facility]  
   
                                                    Chloride   
[Moles/Vol]     108 mmol/L      Normal          101 - 111 mmol/L [facility]   
[facility]  
   
                                                    Cholesterol   
[Mass/Vol]      157 mg/dL       Normal          120 - 200 mg/dL [facility]   
[facility]  
   
                                                    Cholesterol in HDL   
[Mass/Vol]                40 mg/dL                  Invalid   
Interpretation   
Code                                                [facility]   
[facility]  
   
                                        Comment on above:   Interpretive Data: H  
DL > or equal to 60 mg/dL: Low   
cardiovascular   
risk  
HDL < 40 mg/dL : High cardiovascular risk   
   
                                                    Cholesterol in LDL   
[Mass/Vol]      99 mg/dL        Normal          <=129mg/dL      [facility]   
[facility]  
   
                                                    Cholesterol in VLDL   
[Mass/Vol]      13 mg/dL        Normal          7 - 40 mg/dL    [facility]   
[facility]  
   
                      CO2 [Moles/Vol] 22 mmol/L  Normal     21 - 31 mmol/L [facility]   
[facility]  
   
                                                    Creatinine   
[Mass/Vol]      0.9 mg/dL       Normal          0.5 - 1.3 mg/dL [facility]   
[facility]  
   
                                                    GFR/1.73 sq   
M.predicted among   
non-blacks MDRD   
(S/P/Bld) [Vol   
rate/Area]          95 mL/min/1.73 m2   Normal              >=59mL/min/1.73   
m2                                      [facility]  
   
                                        Comment on above:   Interpretive Data: C  
hronic kidney disease could be indicated   
at   
eGFR's of less than 60 mL/min/1.73m2. Kidney failure is indicated   
at less than 15 mL/min/1.73m2.   
   
                                                    Globulin (S)   
[Mass/Vol]      4.0 g/dL        Normal          1.4 - 4.0 gm/dL [facility]   
[facility]  
   
                      Glucose [Mass/Vol] 86 mg/dL   Normal     55 - 199 mg/dL [facility]  
   
[facility]  
   
                                        Comment on above:   Interpretive Data: I  
f this glucose result represents a fasting  
   
glucose, interpretation should refer to the following reference   
range: 55-99 mg/dL   
   
                                                    Potassium   
[Moles/Vol]     3.8 mmol/L      Normal          3.5 - 5.3 mmol/L [facility]   
[facility]  
   
                                                    Prostate specific   
Ag [Mass/Vol]   4.1 ng/mL       High            0.1 - 3.5 ng/mL FT   
[facility]  
   
                                        Comment on above:   Interpretive Data: T  
he concentration of PSA determined by   
different manufacturers can vary due to differences in assay   
methods and reagent specificity. Values obtained from different   
assay methods cannot be used interchangeably. The methodology used   
for this result was chemiluminescence using [employer]'s   
Access [employer] PSA reagent.   
   
                      Protein [Mass/Vol] 7.5 g/dL   Normal     6.0 - 7.8 gm/dL F  
[facility]   
Remisol  
   
                      Sodium [Moles/Vol] 137 mmol/L Normal     135 - 145 mmol/L   
FT   
Rem[facility]  
   
                                                    Triglyceride   
[Mass/Vol]      63 mg/dL        Normal          <=149mg/dL      [facility]   
[facility]  
   
                          TSH Qn       2.09 m[IU]/L Normal       0.34 - 5.60   
mcIU/mL                                 FT   
[facility]  
   
                                                    Urea nitrogen   
[Mass/Vol]      13 mg/dL        Normal          5 - 21 mg/dL    FT   
Rem[facility]  
   
                                                    Urea   
nitrogen/Creatinine   
[Mass ratio]    14 mg/mg        Normal          10 - 20         FT   
Rem[facility]  
   
                                                    CMPon 10-   
   
                      Albumin [Mass/Vol] 3.5 g/dL   Normal     3.3-5.0    [facility]  
   
                                        Comment on above:   Performed By: #### 2  
387903, 0975386, 1378288, 3347537,   
06036925,   
84304115, 9705024 ####[facility] [address]   
[place] [address]   
   
                                                    Albumin/Globulin   
(S) [Mass conc   
ratio]          0.9             Low             1.1-2.2         [facility]  
   
                                        Comment on above:   Performed By: #### 2  
494808, 2657235, 7733855, 8207540,   
94511087,   
05402882, 2677304 ####[facility] [address]   
[place] [address]   
   
                                                    ALP [Catalytic   
activity/Vol]   65 Int._Unit/L  Normal          21-98           [facility]  
   
                                        Comment on above:   Performed By: #### 2  
430580, 6949984, 4517133, 0741317,   
29040528,   
57472371, 8684727 ####[facility] [address]   
[place] [address]   
   
                                                    ALT No additional   
P-5'-P [Catalytic   
activity/Vol]   21 Int._Unit/L  Normal          6-46            [facility]  
   
                                        Comment on above:   Performed By: #### 2  
140325, 2746291, 5792127, 9635570,   
59856910,   
96925481, 5493134 ####[facility] [address]   
[place] [address]   
   
                                                    Anion gap   
[Moles/Vol]     11 mmol/L       Normal          6-16            [facility]  
   
                                        Comment on above:   Performed By: #### 2  
118291, 9604844, 8901855, 0452094,   
72948223,   
10222542, 9001962 ####[facility] [address]   
[place] [address]   
   
                                                    AST [Catalytic   
activity/Vol]   20 Int._Unit/L  Normal          5-43            [facility]  
   
                                        Comment on above:   Performed By: #### 2  
362741, 9827272, 6045325, 0760462,   
89174777,   
65624988, 6683865 ####[facility] [address]   
[place] [address]   
   
                                                    Bilirubin   
[Mass/Vol]      0.6 mg/dL       Normal          0.0-1.1         [facility]  
   
                                        Comment on above:   Performed By: #### 2  
729262, 6036021, 2033704, 0284562,   
93871988,   
24724306, 7632979 ####[facility] [address]   
[place] [address]   
   
                      Calcium [Mass/Vol] 9.1 mg/dL  Normal     8.9-11.1   [facility]  
   
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   
   
                      WBC        6.3 103/ul Normal     4.0-11.0   [facility]   
[facility]  
   
                                        Comment on above:   Performed By: #### C  
BC ####  
[facility]  
[address] [place] [address]  
Dr. Emani Petty   
   
                                                    LIPID PROFILEon 2022   
   
                      CHOL-HDL RATIO NORM SEE BELOW  Normal                The   
[facility]  
   
                                        Comment on above:   Result Comment: 3.3   
- 4.4 LOW RISK 4.4 - 7.1 AVERAGE RISK 7.1   
-   
11.0 MODERATE RISK >11.0 HIGH RISK   
   
                                                            Performed By: #### T  
4, CMP, LIPID, TSH ####  
[facility]  
[address] [place] [address]  
Dr. Emani Petty   
   
                                                    Cholesterol   
[Mass/Vol]      167 mg/dL       Normal          <=200           [facility]  
   
                                        Comment on above:   Performed By: #### T  
4, CMP, LIPID, TSH ####  
[facility]  
[address] [address]  
Dr. Emani Petty   
   
                                                    Cholesterol in HDL   
[Mass/Vol]      53 mg/dL        Normal                          [facility]  
   
                                        Comment on above:   Performed By: #### T  
4, CMP, LIPID, TSH ####  
[facility]  
[address] [address]  
Dr. Emani Petty   
   
                                                    Cholesterol in LDL   
[Mass/Vol]      97.2 mg/dL      Normal                          [facility]  
   
                                        Comment on above:   Performed By: #### T  
4, CMP, LIPID, TSH ####  
[facility]  
[address] [address]  
Dr. Emani Petty   
   
                                                    Cholesterol.total/C  
holesterol in HDL   
[Mass ratio]    3.2 {ratio}     Normal                          [facility]  
   
                                        Comment on above:   Performed By: #### T  
4, CMP, LIPID, TSH ####  
[facility]  
[address] [address]  
Dr. Emani Petty   
   
                                        HDL NORMAL          > or = 60 mg/dl - LO  
W   
CARDIOVASCULAR RISK   
<40 mg/dl - HIGH   
CARDIOVASCULAR RISK Normal                                  [facility]  
   
                                        Comment on above:   Performed By: #### T  
4, CMP, LIPID, TSH ####  
[facility]  
[address] [address]  
Dr. Emnai Petty   
   
                      LDL CALC NORMAL SEE BELOW  Normal                [facility]   
[facility]  
   
                                        Comment on above:   Result Comment: <100  
 mg/dl OPTIMAL 100 - 129 mg/dl NEAR OR   
ABOVE   
OPTIMAL 130 - 159 mg/dl BORDERLINE HIGH 160 - 189 mg/dl HIGH >190   
mg/dl VERY HIGH   
   
                                                            Performed By: #### T  
4, CMP, LIPID, TSH ####  
[facility]  
[address] [address]  
Dr. Emani Petty   
   
                                                    Triglyceride   
[Mass/Vol]      84 mg/dL        Normal          <=150           [facility]   
[facility]  
   
                                        Comment on above:   Performed By: #### T  
4, CMP, LIPID, TSH ####  
[facility]  
[address] [address]  
Dr. Emani Petty   
   
                      VLDL CALC  16.8 mg/dL Normal                [facility]  
   
                                        Comment on above:   Performed By: #### T  
4, CMP, LIPID, TSH ####  
[facility]  
[address] [address]  
Dr. Emani Petty   
   
                                                    PROF 14(COMP METB)on   
022   
   
                      Albumin [Mass/Vol] 3.6 g/dL   Normal     3.5-5.0    [facility]  
   
                                        Comment on above:   Performed By: #### T  
4, CMP, LIPID, TSH ####  
[facility]  
[address] [place] [address]  
Dr. Emani Petty   
   
                                                    Albumin/Globulin   
[Mass ratio]    0.9 {ratio}     Normal                          [facility]  
   
                                        Comment on above:   Performed By: #### T  
4, CMP, LIPID, TSH ####  
[facility]  
[address] [place] [address]  
Dr. Emani Petty   
   
                                                    ALP [Catalytic   
activity/Vol]   63 U/L          Normal                    [facility]  
   
                                        Comment on above:   Performed By: #### T  
4, CMP, LIPID, TSH ####  
[facility]  
[address] [place] [address]  
Dr. Emani Petty   
   
                                                    ALT [Catalytic   
activity/Vol]   42 U/L          Normal          21-72           [facility]   
[facility]  
   
                                        Comment on above:   Performed By: #### T  
4, CMP, LIPID, TSH ####  
[facility]  
[address] [place] [address]  
Dr. Emani Petty   
   
                                                    Anion gap   
[Moles/Vol]     11.3 mmol/L     Normal                          [facility]  
   
                                        Comment on above:   Performed By: #### T  
4, CMP, LIPID, TSH ####  
[facility]  
[address] [place] [address]  
Dr. Emani Petty   
   
                                                    AST [Catalytic   
activity/Vol]   27 U/L          Normal          17-59           The   
[facility]  
   
                                        Comment on above:   Performed By: #### T  
4, CMP, LIPID, TSH ####  
[facility]  
[address] [place] [address]  
Dr. Emani Petty   
   
                                                    Bilirubin   
[Mass/Vol]      0.7 mg/dL       Normal          0.2-1.3         The   
[facility]  
   
                                        Comment on above:   Performed By: #### T  
4, CMP, LIPID, TSH ####  
[facility]  
[address] [place], [place] [address]  
Dr. Emani Petty   
   
                      Calcium [Mass/Vol] 9.0 mg/dL  Normal     8.4-10.2   The   
[facility]  
   
                                        Comment on above:   Performed By: #### T  
4, CMP, LIPID, TSH ####  
[facility]  
[address] [place] [address]  
Dr. Emani Petty   
   
                                                    Chloride   
[Moles/Vol]     107 mmol/L      Normal                    The   
[facility]  
   
                                        Comment on above:   Performed By: #### T  
4, CMP, LIPID, TSH ####  
[facility]  
[address] [place] [address]  
Dr. Emani Petty   
   
                      CO2 [Moles/Vol] 26.2 mmol/L Normal     22.0-30.0  The   
[facility]  
   
                                        Comment on above:   Performed By: #### T  
4, CMP, LIPID, TSH ####  
[facility]  
[address] [place] [address]  
Dr. Emani Petty   
   
                                                    Creatinine   
[Mass/Vol]      1.06 mg/dL      Normal          0.66-1.25       The   
[facility]  
   
                                        Comment on above:   Performed By: #### T  
4, CMP, LIPID, TSH ####  
[facility]  
[address] [place] [address]  
Dr. Emani Petty   
   
                      EGFR-AF AMERICAN >60        Normal     >=60       The   
[facility]  
   
                                        Comment on above:   Performed By: #### T  
4, CMP, LIPID, TSH ####  
[facility]  
[address] [place] [address]  
Dr. Emani Petty   
   
                                                    EGFR-NON AF   
AMERICAN        >60             Normal          >=60            The   
[facility]  
   
                                        Comment on above:   Performed By: #### T  
4, CMP, LIPID, TSH ####  
[facility]  
[address] [place] [address]  
Dr. Emani Petty   
   
                                                    Globulin (S)   
[Mass/Vol]      4.0 g/dL        Normal                          The   
[facility]  
   
                                        Comment on above:   Performed By: #### T  
4, CMP, LIPID, TSH ####  
[facility]  
[address] [place] [address]  
Dr. Emani Petty   
   
                      Glucose [Mass/Vol] 91 mg/dL   Normal          The   
[facility]  
   
                                        Comment on above:   Performed By: #### T  
4, CMP, LIPID, TSH ####  
[facility]  
[address] [place] [address]  
Dr. Emani Petty   
   
                                                    Potassium   
[Moles/Vol]     4.5 mmol/L      Normal          3.4-5.0         [facility]  
   
                                        Comment on above:   Performed By: #### T  
4, CMP, LIPID, TSH ####  
[facility]  
[address] [place] [address]  
Dr. Emani Petty   
   
                      Protein [Mass/Vol] 7.6 g/dL   Normal     6.1-8.2    The   
[facility]  
   
                                        Comment on above:   Performed By: #### T  
4, CMP, LIPID, TSH ####  
[facility]  
[address] [place] [address]  
Dr. Emani Petty   
   
                      Sodium [Moles/Vol] 140 mmol/L Normal     137-145    The   
[facility]  
   
                                        Comment on above:   Performed By: #### T  
4, CMP, LIPID, TSH ####  
[facility]  
[address] [place] [address]  
Dr. Emani Petty   
   
                                                    Urea nitrogen   
[Mass/Vol]      12.0 mg/dL      Normal          9.0-20.0        [facility]  
   
                                        Comment on above:   Performed By: #### T  
4, CMP, LIPID, TSH ####  
[facility]  
[address] [place] [address]  
Dr. Emani Petty   
   
                                                    Urea   
nitrogen/Creatinine   
[Mass ratio]    11.3 mg/mg      Normal                          The   
[facility]  
   
                                        Comment on above:   Performed By: #### T  
4, CMP, LIPID, TSH ####  
[facility]  
[address] [place] [address]  
Dr. Emani Petty   
   
                                                    T4on 2022   
   
                      T4 [Mass/Vol] 11.90 ug/dL Critically high 5.53-11.00 [facility]  
   
                                        Comment on above:   Performed By: #### T  
4, CMP, LIPID, TSH ####  
[facility]  
[address] [place] [address]  
Dr. Emani Petty   
   
                                                    TSHon 2022   
   
                      TSH        1.352 uIU/mL Normal     0.470-4.680 The   
[facility]  
   
                                        Comment on above:   Performed By: #### T  
4, CMP, LIPID, TSH ####  
[facility]  
[address] [place], [place] [address]  
Dr. Emani Petty   
   
                      TSH RANGE  SEE BELOW  Normal                The   
[facility]  
   
                                        Comment on above:   Result Comment: <0.3  
4 UIU/ml HYPERTHYROID 0.34-5.60 UIU/ml   
EUTHYROID >5.60 UIU/ml HYPOTHYROID   
   
                                                            Performed By: #### T  
4, CMP, LIPID, TSH ####  
[facility]  
[address] [place], [place] [address]  
Dr. Emani Petty   
  
  
  
Vital Signs  
  
  
                      Date Time  Vital Sign Value      Performing Clinician Faci  
lity  
   
                                                    2024   
10:12050          Body height         185.4 cm            Sherry Gomez DPM  
Work Phone:   
[phone]                          [facility]  
   
                                                    2024   
10:                              Body mass index   
(BMI) [Ratio]             24.14 kg/m2               Sherry Gomez DPM  
Work Phone:   
[phone]                          [facility]  
   
                                                    2024   
10:12050          Body weight         83.01 kg            Sherry Gomez DPM  
Work Phone:   
[phone]                          [facility]  
  
  
  
Encounters  
  
  
                          Encounter Date Encounter Type Care Provider Facility  
   
                          Start: 2025 ambulatory   Autumn L Schwab Facility:  
[facility]  
   
                          Start: 2025 ambulatory   Autumn L Schwab Facility:  
[facility]  
   
                                                    Start: 2024  
End: 2024     ambulatory          Atuumn L Schwab       Facility:[facility]  
   
                          Start: 2024 ambulatory   Autumn Schwab  Facility:[facility]  
   
                                                    Start: 2024  
End: 2024     ambulatory          Autumn L Schwab       [facility]  
   
                          Start: 2024 ambulatory   Autumn L Schwab [facility]  
[facility]  
   
                                                    Start: 2024  
End: 2024     ambulatory          Autumn L Schwab       Facility:[facility]  
   
                                                    Start: 2024  
End: 2024           Lab Drop off              Autumn L Schwab  
Work Phone:   
[phone]                           [facility]  
Work Phone:   
[phone]  
   
                                                    Start: 2024  
End: 2024     ambulatory          Autumn L Schwab       Facility:[facility]  
   
                                                    Start: 2024  
End: 2024     ambulatory          Autumn L Schwab       Facility:[facility]  
   
                                Start: 2024 Bamboo flowsheet Sherry Gao  
her DPM  
Work Phone:   
[phone]                          [facility]  
   
                                Start: 2024 Bamboo flowsheet Sherry Gao  
her DPM  
Work Phone:   
[phone]                          [facility]  
   
                                                    Start: 2024  
End: 2024     ambulatory          SHERRY GOMEZ    Not Available  
   
                                                    Start: 2024  
End: 2024                         Patient encounter   
procedure                               Sherry Gomez DPM  
Work Phone:   
[phone]                          [facility]  
   
                                        Comment on above:   Onychomycosis (Prima  
ry Dx);  
Onychodystrophy;  
Pain in both feet   
   
                          Start: 2023 ambulatory   Live Alexandre Facility  
:[facility]  
   
                                                    Start: 2023  
End: 2023     ambulatory          Jodi L Schwab       Facility:[facility]  
   
                                                    Start: 2023  
End: 2023                         Evaluation and management   
of inpatient              [facility]  
   
                                                    Start: 10-  
End: 10-           Lab Drop off              Jodi L Schwab  
Work Phone:   
[phone]                           [facility]  
Work Phone:   
[phone]  
   
                                                    Start: 10-  
End: 10-     ambulatory          Jodi L Schwab       Facility:[facility]  
   
                          Start: 10- ambulatory   Live Alexandre Facility  
:[facility]  
   
                          Start: 2023 Letter encounter              [facility]  
   
                          Start: 2023 Letter encounter              [facility]  
   
                          Start: 2022 Letter encounter              [facility]  
   
                                                    Start: 2022  
End: 2022     ambulatory          DR INDERJIT CARDENAS    Facility:  
   
                          Start: 10- Patient encounter status              
  [facility]  
Work Phone:   
[phone]  
  
  
  
Procedures  
  
  
                          Date         Procedure    Procedure Detail Performing   
Clinician  
   
                                        Start: 2013   Surgical fistula   
(morphologic abnormality)                           Jodi Schwab  
Work Phone:   
[phone]  
   
                                       Cholecystectomy              Jodi Schwab  
Work Phone:   
[phone]  
  
  
  
Plan of Treatment  
  
  
                          Date         Care Activity Detail       Author  
   
                                                    Start: 2024  
End: 2024                         Patient encounter   
procedure                               2024 10:15 AM EST   
Procedure Visit [facility]   
[facility] [address] [place] [id number] [phone] Sherry Gomez, DPSIMONE [address] [place], [place] [address] [phone] (Work)   
[phone] (Fax)   
Arrived                                 [facility]  
   
                                        Comment on above:   Arrived   
   
                          Start: 2023 Influenza vaccination Influenza Vacc  
ine (#1) [facility]  
   
                                        Start: 2022   Abdominal aortic ane  
urysm   
screening                               Abdominal aortic aneurysm   
scan                                    [facility]  
   
                                        Start: 2022   Abdominal Aortic Ane  
urysm   
Screening (Age 65+)                     Abdominal Aortic Aneurysm   
Screening (Age 65+)                     [facility]  
   
                                Start: 2022 Pneumococcal vaccination Pneum  
ococcal Vaccine(s)   
(65+ yrs) (1 - PCV)                     [facility]  
   
                          Start: 10- Influenza vaccination Influenza Vacc  
ine (#1) [facility]  
   
                                        Start: 2017   RSV vaccine (optiona  
l 60+   
years)                                  RSV vaccine (optional 60+   
years)                                  [facility]  
   
                                Start: 2016 Annual wellness visit Annual W  
ellness Visit   
()                                 [facility]  
   
                                        Start: 2007   Measurement of occul  
t   
blood in single stool   
specimen                  FIT                       [facility]  
   
                                        Start: 2007   Screening for malign  
ant   
neoplasm of colon         CRC Screening             [facility]  
   
                                        Start: 2007   Shingles (RZV) Vacci  
ne (1   
of 2)                                   Shingles (RZV) Vaccine (1   
of 2)                                   [facility]  
   
                                        Start: 2002   Screening for malign  
ant   
neoplasm of colon                                   [facility]  
   
                          Start: 1992 [facility]id [facility]  [facility]  [facility]  
[facility]  
   
                          Start: 1975 Hepatitis C screening Hepatitis C An  
tibody [facility]  
   
                                        Start: 1975   Tetanus + diphtheria  
 +   
acellular pertussis   
vaccine (product)         Tdap Booster              [facility]  
   
                          Start: 1958 COVID-19 Vaccine (#1) COVID-19 Vacci  
ne (#1) [facility]  
   
                                        Start: 1957   COVID-19 Vaccine   
(3263-3076 formulation)                 COVID-19 Vaccine   
(1388-5469 formulation)                 [facility]  
   
                                        Start: 1957   Screening for malign  
ant   
neoplasm of colon         Colonoscopy               [facility]  
  
  
  
Immunizations  
  
  
                      Immunization Date Immunization Notes      Care Provider Kathy  
peterjuni  
   
                                        2020          tetanus toxoid,   
reduced diphtheria   
toxoid, and acellular   
pertussis vaccine,   
adsorbed                                            Autumn Schwab  
Work Phone:   
[phone]                           [facility]  
   
                                        2017          influenza virus   
vaccine, unspecified   
formulation                                         Autumn Schwab  
Work Phone:   
[phone]                           [facility]  
   
                                        2017          influenza, injectabl  
e,   
quadrivalent,   
preservative free                                   Sherry Rusher DPM  
Work Phone:   
[phone]                          [facility]  
   
                                        10-          influenza, seasonal,  
   
injectable                                                  [facility]  
   
                                        10-          influenza, seasonal,  
   
injectable,   
preservative free                                   Sherry Rusher DPM  
Work Phone:   
[phone]                          [facility]  
   
                                        10-          influenza virus   
vaccine, unspecified   
formulation                                                 [facility]  
   
                                        2014          influenza, seasonal,  
   
injectable,   
preservative free                                           [facility]  
  
  
  
Payers  
  
  
                          Date         Payer Category Payer        Policy ID  
   
                          2015   Medicare                  [id number]  
[id number]  
[phone]  
   
                                2013      Unknown         [employer] - BLUE CR  
OSS BLUE   
CROSS/HMO,PPO,POS   
[id number]   
2013-Present [address]   
[address] [place] [address] PPO            [id number]  
[id number]  
   
                          1960   Unknown                   [id number]  
   
                          1957   Unknown                   [id number]   
[id number]  
   
                          1957   Unknown                   [id number]   
[id number]  
   
                          1957   Unknown                   [id number]   
[id number]  
   
                          1957   Unknown                   [id number]   
[id number]   Unknown                   [id number]   
[id number]   Unknown                   [id number]   
[id number]   Unknown                   [id number]   
[id number]   Unknown                   [id number]   
[id number]   Unknown                   [id number]   
[id number]   Unknown                   [id number]   
[id number]   Unknown                   [id number]   
[id number]   Unknown                   [id number]   
[id number]   Unknown                   [id number]   
[id number]   Unknown                   [id number]   
[id number]   Unknown                   [id number]   
[id number]   Unknown                   [id number]   
[id number]   Unknown                   [id number]   
[id number]   Unknown                   [id number]   
[id number]  
  
  
  
Social History  
  
  
                          Date         Type         Detail       Facility  
   
                                                    Start: 02-  
End: 2024                         Tobacco smoking   
status NHIS               Ex-smoker                 [facility]  
Work Phone:   
[phone]  
   
                                                      
End: 2013                         History of tobacco   
use                       [facility] smoker            [facility]  
   
                                                    Start: 02-  
End: 2023                         Tobacco use and   
exposure                                Smokeless tobacco   
non-user                                [facility]  
   
                                Start: 02- Alcohol intake  Current non-dr  
 of   
alcohol (finding)                       [facility]  
   
                          Start: 1955 Sex Assigned At Birth Not on file  M  
[facility]  
   
                                                    Start: 2023  
End: 2024     Gender identity     Not on file         [facility]  
   
                                                      
End: 2013                         History of tobacco   
use                       Cigarette Smoker          [facility]  
   
                                                    Start: 2023  
End: 2024           Alcohol intake            Lifetime non-drinker   
(finding)                               [facility]  
   
                                                    Start: 2023  
End: 2024                         History of Social   
function                                            [facility]  
  
  
  
History of Present illness Narrative 2024  
                 Sherry Gomez DPM - 2024 10:15 AM EST  
  
                                Note Date & Type Note            Facility  
   
                                                    2024 History of Presen  
t   
illness Narrative                       Formatting of this note is different   
from the original.  
Images from the original note were not   
included.  
  
  
Subjective  
Patient ID: Tawanda Villarreal is a 68   
y.o. male who presents for Nail Care   
(Pt presents today requesting nail   
care, he is with his fiance/SS: 12).  
  
HPI  
Established patient presents to clinic   
with concern of painful toenails.  
  
Review of Systems  
Constitutional: Negative for activity   
change and appetite change.  
Respiratory: Negative for chest   
tightness and shortness of breath.  
Cardiovascular: Positive for leg   
swelling. Negative for chest pain.  
Endocrine: Negative for cold   
intolerance and heat intolerance.  
Musculoskeletal: Positive for   
arthralgias.  
Skin: Negative for color change and   
wound.  
Allergic/Immunologic: Negative for   
immunocompromised state.  
Neurological: Negative for weakness   
and numbness.  
Hematological: Does not bruise/bleed   
easily.  
Psychiatric/Behavioral: Negative for   
agitation and behavioral problems.  
  
Medications  
  
Current Outpatient Medications:  
donepezil (Aricept) 5 MG tablet, Take   
5 mg by mouth at bedtime, Disp: , Rfl:  
memantine (Namenda) 5 MG tablet, Take   
by mouth, Disp: , Rfl:  
  
Allergies  
Patient has no known allergies.  
  
Past Surgical History  
Past Surgical History:  
Procedure Laterality Date  
CHOLECYSTECTOMY  
TOTAL KNEE ARTHROPLASTY Right  
  
Family History  
No family history on file.  
  
Objective  
Physical Exam  
Constitutional:  
General: He is not in acute distress.  
Comments: Accompanied by caregiver.  
HENT:  
Head: Normocephalic and atraumatic.  
Eyes:  
Comments: Right eyelid closed.  
Cardiovascular:  
Pulses: Normal pulses.  
Pulmonary:  
Effort: Pulmonary effort is normal. No   
respiratory distress.  
Musculoskeletal:  
Cervical back: Neck supple.  
Comments: No obvious muscle deficits.   
No significant pedal deformities.  
Skin:  
Capillary Refill: Capillary refill   
takes less than 2 seconds.  
Comments: 4 toenails exhibit clinical   
mycosis with thickened appearance,   
yellow discoloration, crumbly texture   
and subungual debris. All 10 toenails   
are elongated. Tenderness to   
palpation: Bilateral hallux  
Neurological:  
Mental Status: He is alert.  
Comments: No loss of protective   
sensation, gross sensation intact.  
Psychiatric:  
Mood and Affect: Mood normal.  
Behavior: Behavior normal.  
  
Assessment/Plan  
ICD-10-CM  
1. Onychomycosis B35.1  
  
2. Onychodystrophy L60.3  
  
3. Pain in both feet M79.671  
M79.672  
  
  
Patient was examined and evaluated. 10   
toenails were debrided in length and   
thickness today utilizing a nail   
nipper and electric bur    
without incident. Patient noted relief   
of symptomatology post-debridement. I   
have recommended moisturizing the feet   
on a daily basis and discussed proper   
pedal hygiene. Follow-up four months   
for continued palliative nail care.  
  
This note was created with the   
assistance of a speech recognition   
program. While intending to generate a   
timely document that accurately   
reflects the content of the visit, no   
guarantee can be provided that every   
grammatical or spelling mistake has   
been or will be identified or   
corrected. Thank you for your   
understanding.  
  
Sherry Gomez DPM  
Electronically signed by Sherry Gomez DPM at 2024 10:39 AM EST  
documented in this encounter            [facility]  
  
  
  
Evaluation + Plan note  
  
  
                                Note Date & Type Note            Facility  
   
                                        Evaluation + Plan note   
  
  
No data available for this   
section                                 [facility]  
  
  
  
Evaluation + Plan note  
                                   Laboratory  
  
                                Note Date & Type Note            Facility  
   
                                        Evaluation + Plan note   
  
  
Future Appointments  
  
  
Appointment Date:2024   
01:00:00 PM  
Scheduled Provider:  
Location:[facility]  
Appointment Type: Medicare   
Wellness Initial  
  
  
  
Appointment Date:2024   
02:40:00 PM  
Scheduled Provider:Schwab FNP, Jodi L  
Location:[facility]  
Appointment Type: Open  
  
  
  
Future Scheduled TestsPSA   
Screen, Total 10/31/23  
  
                                        [facility]  
  
  
  
Evaluation note  
  
  
                                Note Date & Type Note            Facility  
  
  
  
                                                    Evaluation note   
  
  
  
                                                    Diagnosis  
   
                                                      
  
  
Onychomycosis- Primary  
  
  
Dermatophytosis of nail  
   
                                                      
  
  
Onychodystrophy  
  
  
Other specified disease of nail  
   
                                                      
  
  
Pain in both feet  
  
documented in this encounter  
[facility]  
  
Hospital Discharge instructions  
  
  
                                Note Date & Type Note            Facility  
   
                                        Hospital Discharge instructions   
  
  
No data available for this   
section                                 [facility]  
  
  
  
Progress note  
  
  
                                Note Date & Type Note            Facility  
   
                                        Progress note         
  
  
No data available for this section      [facility]  
  
  
  
Summary Purpose  
  
  
                                                      
  
  
  
Family History  
No Family History Records Found  
  
No data available for this section  
  
No Family History Records FoundNo Family History Records FoundNo Family History 
Records Found  
  
No data available for this section  
  
No Family History Records Found  
  
Advance Directives  
No Advanced Directives Records FoundLatest Code Status on File  
  
                          Code Status  Date Activated Date Inactivated Comments  
   
                          Full Code    10/29/2013 7:58 PM 2013 2:18 PM   
  
  
  
                                                                   
   
                          Full Code    10/9/2013 12:52 PM 10/15/2013 6:01 PM   
  
  
  
                                                                   
   
                          Full Code    2013 9:59 PM 2013 12:53 PM   
  
                           Latest Code Status on File  
  
                          Code Status  Date Activated Date Inactivated Comments  
   
                          Full Code    10/29/2013 7:58 PM 2013 2:18 PM   
  
                               Code Status History  
  
                          Code Status  Date Activated Date Inactivated Comments  
   
                          Full Code    10/9/2013 12:52 PM 10/15/2013 6:01 PM   
   
                          Full Code    2013 9:59 PM 2013 12:53 PM   
  
  
  
Additional Source Comments  
  
  
  
                                                    PREGNANCY (unrecognized sect  
ion and content)  
   
                                                    No Pregnancy Status Records   
FoundNo Pregnancy Status Records FoundNo Pregnancy   
Status   
Records FoundNo Pregnancy Status Records FoundNo Pregnancy Status Records Found  
  
  
  
  
                                                    INFORMATION SOURCE (unrecogn  
ized section and content)  
   
                                          
  
  
  
                                        DATE CREATED        AUTHOR  
   
                                2022                      [facility]  
  
  
  
                                DATE CREATED    AUTHOR          AUTHOR'S ORGANIZ  
ATION  
   
                                2023                      [facility]  
  
  
  
                                DATE CREATED    AUTHOR          AUTHOR'S ORGANIZ  
ATION  
   
                                2024                      [facility]  
  
  
  
                                DATE CREATED    AUTHOR          AUTHOR'S ORGANIZ  
ATION  
   
                                2024                      [facility]  
[facility]  
  
  
  
                                DATE CREATED    AUTHOR          AUTHOR'S ORGANIZ  
ATION  
   
                                10/01/2024                      [facility]  
  
  
  
  
  
                                                    Patient Care team informatio  
n (unrecognized section and content)  
   
                                                      
  
  
Personnel  
  
  
Name: Live Alexandre MD  
Address:  
Address: [facility] [place] [address]-   
  
  
  
Personnel  
  
  
Name: Schwab FNP, Jodi L  
Address:  
Address: [address] [place] [address]-  
  
  
  
  
  
                                                    Reason for Visit (unrecogniz  
ed section and content)  
   
                                          
  
  
  
                                        Reason              Comments  
   
                                        Nail Care           Pt presents today re  
questing nail care, he is with his fianceSS: 14  
  
  
FOR RECORDS PERTAINING TO PATIENTS WHO ARE OR HAVE BEEN ENROLLED IN A CHEMICAL 
DEPENDENCY/SUBSTANCEABUSE PROGRAM, SOME INFORMATION MAY BE OMITTED. This 
clinical summary was aggregated from multiple sources. Caution should be 
exercised in using it in the provision of clinical care. This summary normalizes
 information from multiple sources, and as a consequence, information in this 
document may materially change the coding, format and clinical context of 
patient data. In addition, data may be omitted in some cases. CLINICAL DECISIONS
 SHOULD BE BASED ON THE PRIMARY CLINICAL RECORDS. [employer] provides
 no warranty or guarantee of the accuracy or completeness of information in this
 document.

## 2024-10-29 NOTE — CT_ITS
[address] [address] 
     [phone] 
  
  
Patient Name: 
SANGITA BARRY 
  
MRN: [id number]    YOB: 1955    Sex: M 
Assigned Patient Location: LAB 
Current Patient Location:   
Accession/Order Number: [id number] 
Exam Date: 10/29/2024  12:48    Report Date: 10/30/2024  06:22 
  
At the request of: 
JODI L SCHWAB   
  
Procedure:  CT abdomen wo/w con 
  
EXAMINATION: CT abdomen wo/w con  
  
HISTORY: MASS OF RIGHT KIDNEY 
  
COMPARISON: Ultrasound kidneys 10/3/2024, CT abdomen pelvis 11/22/2023 
  
TECHNIQUE: Axial, Coronal, and Sagittal images were obtained without and/or  
with IV contrast as indicated by examination type. Dose reduction techniques  
were achieved by using automated exposure control and/or adjustment of mA  
and/or kV according to patient size and/or use of iterative reconstruction  
technique 
  
FINDINGS: 
LUNG BASES: No infiltrates or suspicious findings. 
LIVER: No enlargement, atrophy, abnormal density, or significant focal lesion. 
BILIARY: Cholecystectomy. 
PANCREAS: No lesion, fluid collection, ductal dilatation, or atrophy. 
SPLEEN: No enlargement or focal lesion. 
ADRENALS: No mass or enlargement. 
KIDNEYS: Complex lobular cyst within superior pole of right kidney, 7.9 x 6.8  
x  
7.1 cm, with thin internal septations containing calcifications. No  
appreciable  
enhancement. Within inferior pole of right kidney are too small cortical  
cysts.  
Unremarkable left kidney. 
BOWEL/MESENTERY: No visible mass, obstruction, or bowel wall thickening. 
AORTA/VASCULAR: No aneurysm or dissection. 
RETROPERITONEUM: No mass or adenopathy. 
ABDOMINAL WALL: No mass or hernia. 
BONES: T8 moderate compression fracture which appears to be chronic. 
OTHER: Ventriculoperitoneal shunt catheter within right upper quadrant. 
  
ORDER #: [id number] CT/CT abdomen wo/w con  
IMPRESSION:   
   
1. Complex cyst of right kidney which has increased in size, but is still   
suspected to represent benign etiology. No appreciable enhancement of the very 
  
   
thin septations.  
   
   
Electronically authenticated by: CHUYITA CALDERON   Date: 10/30/2024  06:22

## (undated) DEVICE — SINGLE-USE BIOPSY FORCEPS: Brand: RADIAL JAW 4

## (undated) DEVICE — GOWN,SIRUS,NONRNF,SETINSLV,XL,20/CS: Brand: MEDLINE

## (undated) DEVICE — RETRIEVAL BALLOON CATHETER: Brand: EXTRACTOR™ PRO RX

## (undated) DEVICE — SOLUTION ANTIFOG VIS SYS CLEARIFY LAPSCP

## (undated) DEVICE — APPLIER CLP M L L11.4IN DIA10MM ENDOSCP ROT MULT FOR LIG

## (undated) DEVICE — SUTURE VCRL + SZ 0 L27IN ABSRB VLT L26MM UR-6 5/8 CIR VCP603H

## (undated) DEVICE — GLOVE SURG SZ 8 L12IN THK75MIL DK GRN LTX FREE

## (undated) DEVICE — PACK LAP BASIC

## (undated) DEVICE — TROCAR: Brand: KII® SLEEVE

## (undated) DEVICE — SUTURE MCRYL + SZ 4 0 L18IN ABSRB UD PC 3 L16MM 3 8 CIR PRIM MCP845G

## (undated) DEVICE — ELECTRODE LAP L36CM PTFE WIRE J HK CLEANCOAT

## (undated) DEVICE — ELECTRODE PT RET AD L9FT HI MOIST COND ADH HYDRGEL CORDED

## (undated) DEVICE — Device

## (undated) DEVICE — TROCAR: Brand: KII FIOS FIRST ENTRY

## (undated) DEVICE — GLOVE SURG SZ 75 CRM LTX FREE POLYISOPRENE POLYMER BEAD ANTI

## (undated) DEVICE — INSUFFLATION NEEDLE TO ESTABLISH PNEUMOPERITONEUM.: Brand: INSUFFLATION NEEDLE

## (undated) DEVICE — STRAP,POSITIONING,KNEE/BODY,FOAM,4X60": Brand: MEDLINE

## (undated) DEVICE — LIQUIBAND RAPID ADHESIVE 36/CS 0.8ML: Brand: MEDLINE

## (undated) DEVICE — SPHINCTEROTOME: Brand: DREAMTOME™ RX 44

## (undated) DEVICE — SYSTEM BX 25GA FN NDL SIX DST CUT EDG SHARKCORE

## (undated) DEVICE — DEVICE TRCR 12X9X3IN WHT CLSR DISP OMNICLOSE

## (undated) DEVICE — TISSUE RETRIEVAL SYSTEM: Brand: INZII RETRIEVAL SYSTEM

## (undated) DEVICE — LOOP LIG SUT SZ 0 L18IN ABSRB POLYDIOXANONE MFIL PDS II

## (undated) DEVICE — GARMENT,MEDLINE,DVT,INT,CALF,MED, GEN2: Brand: MEDLINE